# Patient Record
Sex: MALE | Race: WHITE | NOT HISPANIC OR LATINO | Employment: FULL TIME | URBAN - METROPOLITAN AREA
[De-identification: names, ages, dates, MRNs, and addresses within clinical notes are randomized per-mention and may not be internally consistent; named-entity substitution may affect disease eponyms.]

---

## 2017-02-03 ENCOUNTER — HOSPITAL ENCOUNTER (OUTPATIENT)
Dept: FAMILY MEDICINE CLINIC | Facility: CLINIC | Age: 57
Setting detail: SPECIMEN
Discharge: HOME | End: 2017-02-03
Attending: PHYSICIAN ASSISTANT | Admitting: PHYSICIAN ASSISTANT

## 2017-02-08 ENCOUNTER — HOSPITAL ENCOUNTER (OUTPATIENT)
Dept: FAMILY MEDICINE CLINIC | Facility: CLINIC | Age: 57
Setting detail: SPECIMEN
Discharge: HOME | End: 2017-02-08
Attending: PHYSICIAN ASSISTANT | Admitting: PHYSICIAN ASSISTANT

## 2017-02-08 LAB
ALBUMIN SERPL-MCNC: 4.3 G/DL (ref 3.5–4.8)
ALBUMIN/GLOB SERPL: 1.7 {RATIO} (ref 1–1.7)
ALP SERPL-CCNC: 53 IU/L (ref 32–91)
ALT SERPL-CCNC: 15 IU/L (ref 17–63)
ANION GAP SERPL CALC-SCNC: 12.5 MMOL/L (ref 10–20)
AST SERPL-CCNC: 32 IU/L (ref 15–41)
BILIRUB SERPL-MCNC: 0.9 MG/DL (ref 0.3–1.2)
BUN SERPL-MCNC: 19 MG/DL (ref 8–20)
BUN/CREAT SERPL: 15.8 (ref 6.2–20.3)
CALCIUM SERPL-MCNC: 10 MG/DL (ref 8.9–10.3)
CHLORIDE SERPL-SCNC: 103 MMOL/L (ref 101–111)
CHOLEST SERPL-MCNC: 149 MG/DL
CHOLEST/HDLC SERPL: 4.9 {RATIO}
CONV CO2: 29 MMOL/L (ref 22–32)
CONV LDL CHOLESTEROL DIRECT: 98 MG/DL (ref 0–100)
CONV TOTAL PROTEIN: 6.8 G/DL (ref 6.1–7.9)
CREAT UR-MCNC: 1.2 MG/DL (ref 0.7–1.2)
GLOBULIN UR ELPH-MCNC: 2.5 G/DL (ref 2.5–3.8)
GLUCOSE SERPL-MCNC: 92 MG/DL (ref 65–99)
HDLC SERPL-MCNC: 30 MG/DL
LDLC/HDLC SERPL: 3.2 {RATIO}
LIPID INTERPRETATION: ABNORMAL
POTASSIUM SERPL-SCNC: 4.5 MMOL/L (ref 3.6–5.1)
PSA SERPL-MCNC: 0.44 NG/ML (ref 0–4)
SODIUM SERPL-SCNC: 140 MMOL/L (ref 136–144)
TRIGL SERPL-MCNC: 219 MG/DL
TSH SERPL-ACNC: 1.61 UIU/ML (ref 0.34–5.6)
VLDLC SERPL CALC-MCNC: 21.2 MG/DL

## 2017-03-07 ENCOUNTER — HOSPITAL ENCOUNTER (OUTPATIENT)
Dept: PAIN MEDICINE | Facility: HOSPITAL | Age: 57
Discharge: HOME | End: 2017-03-07
Attending: ANESTHESIOLOGY | Admitting: ANESTHESIOLOGY

## 2017-05-15 ENCOUNTER — HOSPITAL ENCOUNTER (OUTPATIENT)
Dept: SLEEP MEDICINE | Facility: HOSPITAL | Age: 57
Discharge: HOME | End: 2017-05-15
Attending: INTERNAL MEDICINE | Admitting: INTERNAL MEDICINE

## 2017-10-26 ENCOUNTER — HOSPITAL ENCOUNTER (OUTPATIENT)
Dept: PREADMISSION TESTING | Facility: HOSPITAL | Age: 57
Discharge: HOME | End: 2017-10-26
Attending: ORTHOPAEDIC SURGERY | Admitting: ORTHOPAEDIC SURGERY

## 2017-10-26 LAB
ANION GAP SERPL CALC-SCNC: 10.2 MMOL/L (ref 10–20)
BACTERIA SPEC AEROBE CULT: NORMAL
BASOPHILS # BLD AUTO: 0.1 10*3/UL (ref 0–0.2)
BASOPHILS NFR BLD AUTO: 1 % (ref 0–2)
BILIRUB UR QL STRIP: NEGATIVE MG/DL
BUN SERPL-MCNC: 16 MG/DL (ref 8–20)
BUN/CREAT SERPL: 20 (ref 6.2–20.3)
CALCIUM SERPL-MCNC: 9.1 MG/DL (ref 8.9–10.3)
CASTS URNS QL MICRO: NORMAL /[LPF]
CHLORIDE SERPL-SCNC: 106 MMOL/L (ref 101–111)
COLOR UR: YELLOW
CONV BACTERIA IN URINE MICRO: NEGATIVE
CONV CLARITY OF URINE: CLEAR
CONV CO2: 24 MMOL/L (ref 22–32)
CONV HYALINE CASTS IN URINE MICRO: 0 /[LPF] (ref 0–5)
CONV PROTEIN IN URINE BY AUTOMATED TEST STRIP: NEGATIVE MG/DL
CONV SMALL ROUND CELLS: NORMAL /[HPF]
CONV UROBILINOGEN IN URINE BY AUTOMATED TEST STRIP: 1 MG/DL
CREAT UR-MCNC: 0.8 MG/DL (ref 0.7–1.2)
CULTURE INDICATED?: NORMAL
DIFFERENTIAL METHOD BLD: (no result)
EOSINOPHIL # BLD AUTO: 0.2 10*3/UL (ref 0–0.3)
EOSINOPHIL # BLD AUTO: 3 % (ref 0–3)
ERYTHROCYTE [DISTWIDTH] IN BLOOD BY AUTOMATED COUNT: 13.8 % (ref 11.5–14.5)
GLUCOSE SERPL-MCNC: 96 MG/DL (ref 65–99)
GLUCOSE UR QL: NEGATIVE MG/DL
HCT VFR BLD AUTO: 55.9 % (ref 40–54)
HGB BLD-MCNC: (no result) G/DL
HGB BLD-MCNC: (no result) G/DL (ref 14–18)
HGB UR QL STRIP: NEGATIVE
KETONES UR QL STRIP: NEGATIVE MG/DL
LEUKOCYTE ESTERASE UR QL STRIP: NEGATIVE
LYMPHOCYTES # BLD AUTO: 1.1 10*3/UL (ref 0.8–4.8)
LYMPHOCYTES NFR BLD AUTO: 18 % (ref 18–42)
Lab: NORMAL
MCH RBC QN AUTO: 31.2 PG (ref 26–32)
MCHC RBC AUTO-ENTMCNC: 33.5 G/DL (ref 32–36)
MCV RBC AUTO: 93.2 FL (ref 80–94)
MICRO REPORT STATUS: NORMAL
MONOCYTES # BLD AUTO: 0.4 10*3/UL (ref 0.1–1.3)
MONOCYTES NFR BLD AUTO: 7 % (ref 2–11)
NEUTROPHILS # BLD AUTO: 4.6 10*3/UL (ref 2.3–8.6)
NEUTROPHILS NFR BLD AUTO: 71 % (ref 50–75)
NITRITE UR QL STRIP: NEGATIVE
NRBC BLD AUTO-RTO: 0 /100{WBCS}
NRBC/RBC NFR BLD MANUAL: 0 10*3/UL
PH UR STRIP.AUTO: 6 [PH] (ref 4.5–8)
PLATELET # BLD AUTO: 113 10*3/UL (ref 150–450)
PMV BLD AUTO: 10.1 FL (ref 7.4–10.4)
POTASSIUM SERPL-SCNC: 4.2 MMOL/L (ref 3.6–5.1)
RBC # BLD AUTO: 5.99 10*6/UL (ref 4.6–6)
RBC #/AREA URNS HPF: 2 /[HPF] (ref 0–3)
SODIUM SERPL-SCNC: 136 MMOL/L (ref 136–144)
SP GR UR: 1.02 (ref 1–1.03)
SPECIMEN SOURCE: NORMAL
SPERM URNS QL MICRO: NORMAL /[HPF]
SQUAMOUS SPT QL MICRO: 0 /[HPF] (ref 0–5)
UNIDENT CRYS URNS QL MICRO: NORMAL /[HPF]
WBC # BLD AUTO: 6.4 10*3/UL (ref 4.5–11.5)
WBC #/AREA URNS HPF: 1 /[HPF] (ref 0–5)
YEAST SPEC QL WET PREP: NORMAL /[HPF]

## 2017-11-01 ENCOUNTER — HOSPITAL ENCOUNTER (OUTPATIENT)
Dept: PREOP | Facility: HOSPITAL | Age: 57
Setting detail: HOSPITAL OUTPATIENT SURGERY
Discharge: HOME HEALTH | End: 2017-11-01
Attending: ORTHOPAEDIC SURGERY | Admitting: ORTHOPAEDIC SURGERY

## 2017-11-22 ENCOUNTER — HOSPITAL ENCOUNTER (OUTPATIENT)
Dept: PHYSICAL THERAPY | Facility: HOSPITAL | Age: 57
Setting detail: RECURRING SERIES
Discharge: HOME | End: 2018-01-31
Attending: ORTHOPAEDIC SURGERY | Admitting: ORTHOPAEDIC SURGERY

## 2018-01-10 ENCOUNTER — HOSPITAL ENCOUNTER (OUTPATIENT)
Dept: PREADMISSION TESTING | Facility: HOSPITAL | Age: 58
Discharge: HOME | End: 2018-01-10
Attending: ORTHOPAEDIC SURGERY | Admitting: ORTHOPAEDIC SURGERY

## 2018-01-10 LAB
ANION GAP SERPL CALC-SCNC: 11.7 MMOL/L (ref 10–20)
BACTERIA SPEC AEROBE CULT: NORMAL
BASOPHILS # BLD AUTO: 0 10*3/UL (ref 0–0.2)
BASOPHILS NFR BLD AUTO: 1 % (ref 0–2)
BILIRUB UR QL STRIP: NEGATIVE MG/DL
BUN SERPL-MCNC: 21 MG/DL (ref 8–20)
BUN/CREAT SERPL: 21 (ref 6.2–20.3)
CALCIUM SERPL-MCNC: 9.7 MG/DL (ref 8.9–10.3)
CASTS URNS QL MICRO: NORMAL /[LPF]
CHLORIDE SERPL-SCNC: 103 MMOL/L (ref 101–111)
COLOR UR: YELLOW
CONV BACTERIA IN URINE MICRO: NEGATIVE
CONV CLARITY OF URINE: CLEAR
CONV CO2: 31 MMOL/L (ref 22–32)
CONV HYALINE CASTS IN URINE MICRO: 1 /[LPF] (ref 0–5)
CONV PROTEIN IN URINE BY AUTOMATED TEST STRIP: NEGATIVE MG/DL
CONV SMALL ROUND CELLS: NORMAL /[HPF]
CONV UROBILINOGEN IN URINE BY AUTOMATED TEST STRIP: 1 MG/DL
CREAT UR-MCNC: 1 MG/DL (ref 0.7–1.2)
CULTURE INDICATED?: NORMAL
DIFFERENTIAL METHOD BLD: (no result)
EOSINOPHIL # BLD AUTO: 0.2 10*3/UL (ref 0–0.3)
EOSINOPHIL # BLD AUTO: 4 % (ref 0–3)
ERYTHROCYTE [DISTWIDTH] IN BLOOD BY AUTOMATED COUNT: 13.9 % (ref 11.5–14.5)
GLUCOSE SERPL-MCNC: 90 MG/DL (ref 65–99)
GLUCOSE UR QL: NEGATIVE MG/DL
HCT VFR BLD AUTO: 52.4 % (ref 40–54)
HGB BLD-MCNC: 17.3 G/DL (ref 14–18)
HGB UR QL STRIP: NEGATIVE
KETONES UR QL STRIP: NEGATIVE MG/DL
LEUKOCYTE ESTERASE UR QL STRIP: NEGATIVE
LYMPHOCYTES # BLD AUTO: 1.5 10*3/UL (ref 0.8–4.8)
LYMPHOCYTES NFR BLD AUTO: 23 % (ref 18–42)
Lab: NORMAL
MCH RBC QN AUTO: 31 PG (ref 26–32)
MCHC RBC AUTO-ENTMCNC: 33.1 G/DL (ref 32–36)
MCV RBC AUTO: 93.8 FL (ref 80–94)
MICRO REPORT STATUS: NORMAL
MONOCYTES # BLD AUTO: 0.4 10*3/UL (ref 0.1–1.3)
MONOCYTES NFR BLD AUTO: 6 % (ref 2–11)
NEUTROPHILS # BLD AUTO: 4.3 10*3/UL (ref 2.3–8.6)
NEUTROPHILS NFR BLD AUTO: 66 % (ref 50–75)
NITRITE UR QL STRIP: NEGATIVE
NRBC BLD AUTO-RTO: 0 /100{WBCS}
NRBC/RBC NFR BLD MANUAL: 0 10*3/UL
PH UR STRIP.AUTO: 6.5 [PH] (ref 4.5–8)
PLATELET # BLD AUTO: 109 10*3/UL (ref 150–450)
PMV BLD AUTO: 10 FL (ref 7.4–10.4)
POTASSIUM SERPL-SCNC: 4.7 MMOL/L (ref 3.6–5.1)
RBC # BLD AUTO: 5.59 10*6/UL (ref 4.6–6)
RBC #/AREA URNS HPF: 2 /[HPF] (ref 0–3)
SODIUM SERPL-SCNC: 141 MMOL/L (ref 136–144)
SP GR UR: 1.03 (ref 1–1.03)
SPECIMEN SOURCE: NORMAL
SPERM URNS QL MICRO: NORMAL /[HPF]
SQUAMOUS SPT QL MICRO: 0 /[HPF] (ref 0–5)
UNIDENT CRYS URNS QL MICRO: NORMAL /[HPF]
WBC # BLD AUTO: 6.5 10*3/UL (ref 4.5–11.5)
WBC #/AREA URNS HPF: 1 /[HPF] (ref 0–5)
YEAST SPEC QL WET PREP: NORMAL /[HPF]

## 2018-01-17 ENCOUNTER — HOSPITAL ENCOUNTER (OUTPATIENT)
Dept: PREOP | Facility: HOSPITAL | Age: 58
Setting detail: HOSPITAL OUTPATIENT SURGERY
Discharge: HOME | End: 2018-01-17
Attending: ORTHOPAEDIC SURGERY | Admitting: ORTHOPAEDIC SURGERY

## 2018-02-15 ENCOUNTER — HOSPITAL ENCOUNTER (OUTPATIENT)
Dept: PHYSICAL THERAPY | Facility: HOSPITAL | Age: 58
Setting detail: RECURRING SERIES
Discharge: HOME | End: 2018-04-30
Attending: ORTHOPAEDIC SURGERY | Admitting: ORTHOPAEDIC SURGERY

## 2019-04-10 ENCOUNTER — HOSPITAL ENCOUNTER (OUTPATIENT)
Dept: FAMILY MEDICINE CLINIC | Facility: CLINIC | Age: 59
Setting detail: SPECIMEN
Discharge: HOME | End: 2019-04-10
Attending: FAMILY MEDICINE | Admitting: FAMILY MEDICINE

## 2019-04-10 LAB
ALBUMIN SERPL-MCNC: 3.9 G/DL (ref 3.5–4.8)
ALBUMIN/GLOB SERPL: 1.7 {RATIO} (ref 1–1.7)
ALP SERPL-CCNC: 52 IU/L (ref 32–91)
ALT SERPL-CCNC: 13 IU/L (ref 17–63)
ANION GAP SERPL CALC-SCNC: 15.1 MMOL/L (ref 10–20)
AST SERPL-CCNC: 24 IU/L (ref 15–41)
BASOPHILS # BLD AUTO: 0 10*3/UL (ref 0–0.2)
BASOPHILS NFR BLD AUTO: 1 % (ref 0–2)
BILIRUB SERPL-MCNC: 0.9 MG/DL (ref 0.3–1.2)
BUN SERPL-MCNC: 22 MG/DL (ref 8–20)
BUN/CREAT SERPL: 22 (ref 6.2–20.3)
CALCIUM SERPL-MCNC: 9.1 MG/DL (ref 8.9–10.3)
CHLORIDE SERPL-SCNC: 103 MMOL/L (ref 101–111)
CHOLEST SERPL-MCNC: 132 MG/DL
CHOLEST/HDLC SERPL: 3.2 {RATIO}
CONV CO2: 26 MMOL/L (ref 22–32)
CONV LDL CHOLESTEROL DIRECT: 83 MG/DL (ref 0–100)
CONV TOTAL PROTEIN: 6.2 G/DL (ref 6.1–7.9)
CREAT UR-MCNC: 1 MG/DL (ref 0.7–1.2)
DIFFERENTIAL METHOD BLD: (no result)
EOSINOPHIL # BLD AUTO: 0.1 10*3/UL (ref 0–0.3)
EOSINOPHIL # BLD AUTO: 2 % (ref 0–3)
ERYTHROCYTE [DISTWIDTH] IN BLOOD BY AUTOMATED COUNT: 13.5 % (ref 11.5–14.5)
GLOBULIN UR ELPH-MCNC: 2.3 G/DL (ref 2.5–3.8)
GLUCOSE SERPL-MCNC: 80 MG/DL (ref 65–99)
HCT VFR BLD AUTO: 52.5 % (ref 40–54)
HDLC SERPL-MCNC: 42 MG/DL
HGB BLD-MCNC: 17.6 G/DL (ref 14–18)
LDLC/HDLC SERPL: 2 {RATIO}
LIPID INTERPRETATION: NORMAL
LYMPHOCYTES # BLD AUTO: 0.9 10*3/UL (ref 0.8–4.8)
LYMPHOCYTES NFR BLD AUTO: 19 % (ref 18–42)
MCH RBC QN AUTO: 32.1 PG (ref 26–32)
MCHC RBC AUTO-ENTMCNC: 33.5 G/DL (ref 32–36)
MCV RBC AUTO: 95.9 FL (ref 80–94)
MONOCYTES # BLD AUTO: 0.3 10*3/UL (ref 0.1–1.3)
MONOCYTES NFR BLD AUTO: 7 % (ref 2–11)
NEUTROPHILS # BLD AUTO: 3.4 10*3/UL (ref 2.3–8.6)
NEUTROPHILS NFR BLD AUTO: 71 % (ref 50–75)
NRBC BLD AUTO-RTO: 0 /100{WBCS}
NRBC/RBC NFR BLD MANUAL: 0 10*3/UL
PLATELET # BLD AUTO: 117 10*3/UL (ref 150–450)
PMV BLD AUTO: 10.6 FL (ref 7.4–10.4)
POTASSIUM SERPL-SCNC: 4.1 MMOL/L (ref 3.6–5.1)
RBC # BLD AUTO: 5.47 10*6/UL (ref 4.6–6)
SODIUM SERPL-SCNC: 140 MMOL/L (ref 136–144)
TRIGL SERPL-MCNC: 40 MG/DL
VLDLC SERPL CALC-MCNC: 7.3 MG/DL
WBC # BLD AUTO: 4.8 10*3/UL (ref 4.5–11.5)

## 2019-07-18 ENCOUNTER — TELEPHONE (OUTPATIENT)
Dept: FAMILY MEDICINE CLINIC | Facility: CLINIC | Age: 59
End: 2019-07-18

## 2019-07-18 NOTE — TELEPHONE ENCOUNTER
I spoke with the patient's wife.  The patient's labs are okay.  He is to continue as is.  His blood pressure has been running high.  He is to schedule an appointment.

## 2019-07-23 PROBLEM — I10 BENIGN ESSENTIAL HYPERTENSION: Status: ACTIVE | Noted: 2017-02-03

## 2019-07-23 PROBLEM — G47.30 SLEEP APNEA: Status: ACTIVE | Noted: 2019-07-23

## 2019-07-23 RX ORDER — TESTOSTERONE CYPIONATE 200 MG/ML
200 INJECTION, SOLUTION INTRAMUSCULAR
Refills: 0 | COMMUNITY
Start: 2019-06-18 | End: 2022-01-12

## 2019-07-23 RX ORDER — MELOXICAM 15 MG/1
TABLET ORAL EVERY 24 HOURS
COMMUNITY
Start: 2017-09-21 | End: 2019-08-12

## 2019-07-23 RX ORDER — AZELASTINE HYDROCHLORIDE 137 UG/1
SPRAY, METERED NASAL
COMMUNITY
Start: 2017-03-07 | End: 2019-08-12

## 2019-07-23 RX ORDER — SILDENAFIL 100 MG/1
100 TABLET, FILM COATED ORAL AS NEEDED
Status: ON HOLD | COMMUNITY
Start: 2019-04-10 | End: 2022-01-14

## 2019-07-23 RX ORDER — LEVOTHYROXINE AND LIOTHYRONINE 9.5; 2.25 UG/1; UG/1
TABLET ORAL EVERY 24 HOURS
COMMUNITY
Start: 2017-02-03 | End: 2019-10-09

## 2019-07-23 RX ORDER — PENTOXIFYLLINE 400 MG/1
400 TABLET, EXTENDED RELEASE ORAL EVERY 12 HOURS
Qty: 180 TABLET | Refills: 0 | Status: SHIPPED | OUTPATIENT
Start: 2019-07-23 | End: 2019-08-01 | Stop reason: SDUPTHER

## 2019-07-23 RX ORDER — AMLODIPINE BESYLATE 5 MG/1
5 TABLET ORAL DAILY
Refills: 2 | COMMUNITY
Start: 2019-07-15 | End: 2020-08-31

## 2019-07-23 RX ORDER — MONTELUKAST SODIUM 10 MG/1
TABLET ORAL EVERY 24 HOURS
COMMUNITY
Start: 2018-11-12 | End: 2019-08-01 | Stop reason: SDUPTHER

## 2019-07-23 RX ORDER — PENTOXIFYLLINE 400 MG/1
1 TABLET, EXTENDED RELEASE ORAL EVERY 12 HOURS
COMMUNITY
Start: 2014-10-16 | End: 2019-07-23 | Stop reason: SDUPTHER

## 2019-07-23 RX ORDER — TRAMADOL HYDROCHLORIDE 100 MG/1
1 CAPSULE ORAL AS NEEDED
COMMUNITY
Start: 2017-01-30 | End: 2021-02-01

## 2019-07-23 RX ORDER — MECLIZINE HYDROCHLORIDE 25 MG/1
25 TABLET ORAL 3 TIMES DAILY PRN
Status: ON HOLD | COMMUNITY
Start: 2017-01-30 | End: 2022-01-14

## 2019-08-01 RX ORDER — MONTELUKAST SODIUM 10 MG/1
10 TABLET ORAL EVERY 24 HOURS
Qty: 90 TABLET | Refills: 0 | Status: SHIPPED | OUTPATIENT
Start: 2019-08-01 | End: 2019-10-03 | Stop reason: SDUPTHER

## 2019-08-01 RX ORDER — PENTOXIFYLLINE 400 MG/1
400 TABLET, EXTENDED RELEASE ORAL EVERY 12 HOURS
Qty: 180 TABLET | Refills: 0 | Status: SHIPPED | OUTPATIENT
Start: 2019-08-01 | End: 2019-08-12

## 2019-08-12 ENCOUNTER — OFFICE VISIT (OUTPATIENT)
Dept: FAMILY MEDICINE CLINIC | Facility: CLINIC | Age: 59
End: 2019-08-12

## 2019-08-12 VITALS
OXYGEN SATURATION: 98 % | DIASTOLIC BLOOD PRESSURE: 65 MMHG | BODY MASS INDEX: 29.86 KG/M2 | SYSTOLIC BLOOD PRESSURE: 114 MMHG | HEART RATE: 59 BPM | WEIGHT: 240.2 LBS | HEIGHT: 75 IN | RESPIRATION RATE: 16 BRPM | TEMPERATURE: 98 F

## 2019-08-12 DIAGNOSIS — R42 VERTIGO: ICD-10-CM

## 2019-08-12 DIAGNOSIS — J30.9 ALLERGIC RHINITIS, UNSPECIFIED SEASONALITY, UNSPECIFIED TRIGGER: ICD-10-CM

## 2019-08-12 DIAGNOSIS — I10 BENIGN ESSENTIAL HYPERTENSION: Primary | ICD-10-CM

## 2019-08-12 DIAGNOSIS — E29.1 TESTICULAR HYPOFUNCTION: ICD-10-CM

## 2019-08-12 DIAGNOSIS — M19.90 ARTHRITIS: ICD-10-CM

## 2019-08-12 DIAGNOSIS — G47.30 SLEEP APNEA, UNSPECIFIED TYPE: ICD-10-CM

## 2019-08-12 PROBLEM — E03.9 ACQUIRED HYPOTHYROIDISM: Status: ACTIVE | Noted: 2019-08-12

## 2019-08-12 PROCEDURE — 99214 OFFICE O/P EST MOD 30 MIN: CPT | Performed by: FAMILY MEDICINE

## 2019-08-12 RX ORDER — ANASTROZOLE 1 MG/1
TABLET ORAL
COMMUNITY
Start: 2016-11-30 | End: 2022-01-12

## 2019-08-12 RX ORDER — OLMESARTAN MEDOXOMIL 20 MG/1
10 TABLET ORAL EVERY MORNING
Refills: 2 | COMMUNITY
Start: 2019-07-24 | End: 2021-12-14 | Stop reason: SDUPTHER

## 2019-08-12 RX ORDER — CETIRIZINE HYDROCHLORIDE 10 MG/1
10 TABLET ORAL EVERY MORNING
COMMUNITY

## 2019-08-12 RX ORDER — FLUTICASONE PROPIONATE 50 MCG
2 SPRAY, SUSPENSION (ML) NASAL AS NEEDED
COMMUNITY

## 2019-08-12 NOTE — PROGRESS NOTES
"Subjective   Robin Wheatley is a 58 y.o. male.     Chief Complaint   Patient presents with   • Hypertension     FOLLOWUP       HPI  Complaint: Hypertension allergic rhinitis sleep apnea hypothyroidism arthritis vertigo    The patient is a 58-year-old white male comes in for follow-up and maintenance of his current problems which include    #1 hypertension-stable-patient Benicar 10 mg daily and amlodipine 5 mg daily.  These were started recently previous antihypertensive while training.  He denies any lightheaded dizziness or chest pain.    #2 allergic rhinitis-stable-patient on Flonase Singulair and Zyrtec.  Disposition cough shortness breath or wheezing.    #3 hypothyroidism-stable patient on Synthroid.  He denies heat or cold intolerance tremor weight gain weight loss    #4 vertigo-stable-patient is off Trental.  Patient states that he has not had an episode of vertigo since he takes Zyrtec and Singulair.    #5 testicular hypofunction-stable-patient on testosterone repletion therapy index.  Patient states that helps with his strength stamina and energy        The following portions of the patient's history were reviewed and updated as appropriate: allergies, current medications, past family history, past medical history, past social history, past surgical history and problem list.    Review of Systems    Objective     /65 (BP Location: Left arm, Patient Position: Sitting, Cuff Size: Adult)   Pulse 59   Temp 98 °F (36.7 °C) (Oral)   Resp 16   Ht 190.5 cm (75\")   Wt 109 kg (240 lb 3.2 oz)   SpO2 98%   BMI 30.02 kg/m²     Physical Exam   Constitutional: He is oriented to person, place, and time. He appears well-developed and well-nourished.   HENT:   Head: Normocephalic and atraumatic.   Eyes: Conjunctivae and EOM are normal. Pupils are equal, round, and reactive to light.   Neck: Normal range of motion. Neck supple.   Cardiovascular: Normal rate, regular rhythm, normal heart sounds and intact distal " pulses.   Pulmonary/Chest: Effort normal and breath sounds normal.   Abdominal: Soft. Bowel sounds are normal.   Musculoskeletal: Normal range of motion.   Neurological: He is alert and oriented to person, place, and time.   Skin: Skin is warm and dry.   Psychiatric: He has a normal mood and affect. His behavior is normal.   Nursing note and vitals reviewed.        Assessment/Plan   Robin was seen today for hypertension.    Diagnoses and all orders for this visit:    Benign essential hypertension    Allergic rhinitis, unspecified seasonality, unspecified trigger    Sleep apnea, unspecified type    Testicular hypofunction    Vertigo    Arthritis      Patient Instructions   Continue your current medications and treatment.    Follow up in 6 months.    Laboratory testing at that time.      Marlo Michaels Jr., MD    08/12/19

## 2019-08-12 NOTE — PATIENT INSTRUCTIONS
Continue your current medications and treatment.    Follow up in 6 months.    Laboratory testing at that time.

## 2019-10-06 RX ORDER — MONTELUKAST SODIUM 10 MG/1
TABLET ORAL
Qty: 90 TABLET | Refills: 0 | Status: SHIPPED | OUTPATIENT
Start: 2019-10-06 | End: 2019-12-05 | Stop reason: SDUPTHER

## 2019-10-09 ENCOUNTER — OFFICE VISIT (OUTPATIENT)
Dept: FAMILY MEDICINE CLINIC | Facility: CLINIC | Age: 59
End: 2019-10-09

## 2019-10-09 VITALS
TEMPERATURE: 98.6 F | RESPIRATION RATE: 20 BRPM | DIASTOLIC BLOOD PRESSURE: 69 MMHG | WEIGHT: 244.9 LBS | BODY MASS INDEX: 30.45 KG/M2 | HEART RATE: 50 BPM | OXYGEN SATURATION: 98 % | HEIGHT: 75 IN | SYSTOLIC BLOOD PRESSURE: 115 MMHG

## 2019-10-09 DIAGNOSIS — E03.9 ACQUIRED HYPOTHYROIDISM: ICD-10-CM

## 2019-10-09 DIAGNOSIS — R42 VERTIGO: ICD-10-CM

## 2019-10-09 DIAGNOSIS — G47.30 SLEEP APNEA, UNSPECIFIED TYPE: ICD-10-CM

## 2019-10-09 DIAGNOSIS — I10 BENIGN ESSENTIAL HYPERTENSION: Primary | ICD-10-CM

## 2019-10-09 DIAGNOSIS — E29.1 TESTICULAR HYPOFUNCTION: ICD-10-CM

## 2019-10-09 DIAGNOSIS — J30.9 ALLERGIC RHINITIS, UNSPECIFIED SEASONALITY, UNSPECIFIED TRIGGER: ICD-10-CM

## 2019-10-09 PROCEDURE — 99214 OFFICE O/P EST MOD 30 MIN: CPT | Performed by: FAMILY MEDICINE

## 2019-10-09 RX ORDER — THYROID 60 MG
1 TABLET ORAL DAILY
COMMUNITY
Start: 2019-09-11 | End: 2021-07-26 | Stop reason: SDUPTHER

## 2019-10-09 RX ORDER — PENTOXIFYLLINE 400 MG/1
1 TABLET, EXTENDED RELEASE ORAL 2 TIMES DAILY PRN
COMMUNITY
Start: 2019-09-22 | End: 2019-11-25 | Stop reason: SDUPTHER

## 2019-10-09 NOTE — PROGRESS NOTES
"Subjective   Robin Wheatley is a 59 y.o. male.     Chief Complaint   Patient presents with   • Hypothyroidism   • Hypertension       HPI  Chief complaint: Hypertension hyper thyroidism allergic rhinitis sleep apnea vertigo    Patient is a 59-year-old white male comes in for follow-up and maintenance of his current problems which includes    1.  Hypertension-stable patient on Benicar 20 g daily and Norvasc 5 mg daily.  He denies any lightheaded dizziness or chest pain.    2.  Hypothyroidism-stable patient on thyroid replacement therapy.  Denied heat or cold intolerance.  No tremor weight gain weight loss.    3.  Allergic rhinitis-stable-patient is on singular 10 mg once a day Flonase 2 sprays each nostril once antihistamines.  Nasal congestion cough shortness breath or wheezing.     4.  Hypothyroidism-stable on patient current Synthroid 60 mg a day.  Not your cold intolerance.  No tremor weight gain weight loss.    5.  Testicular hypofunction-stable-patient is currently on testosterone replacement.  States it helps with his strength stamina libido    6.  Vertigo-stable-the  patient is on Trental on as-needed basis.  He has occasional episodes of early dizziness.    7.  Sleep Apnea -stable- the patient is on CPAP.  States it helps the daytime sleepiness and hypersomnolence per      The following portions of the patient's history were reviewed and updated as appropriate: allergies, current medications, past family history, past medical history, past social history, past surgical history and problem list.    Review of Systems    Objective     /69 (BP Location: Left arm, Patient Position: Sitting, Cuff Size: Large Adult)   Pulse 50   Temp 98.6 °F (37 °C) (Oral)   Resp 20   Ht 190.5 cm (75\")   Wt 111 kg (244 lb 14.4 oz)   SpO2 98%   BMI 30.61 kg/m²     Physical Exam   Constitutional: He is oriented to person, place, and time. He appears well-developed and well-nourished.   HENT:   Head: Normocephalic and " atraumatic.   Eyes: Conjunctivae and EOM are normal. Pupils are equal, round, and reactive to light.   Neck: Normal range of motion. Neck supple.   Cardiovascular: Normal rate, regular rhythm, normal heart sounds and intact distal pulses.   Pulmonary/Chest: Effort normal and breath sounds normal.   Abdominal: Soft. Bowel sounds are normal.   Musculoskeletal: Normal range of motion.   Neurological: He is alert and oriented to person, place, and time.   Skin: Skin is warm and dry.   Psychiatric: He has a normal mood and affect. His behavior is normal.   Nursing note and vitals reviewed.        Assessment/Plan   Robin was seen today for hypothyroidism and hypertension.    Diagnoses and all orders for this visit:    Benign essential hypertension  -     CBC & Differential; Future  -     Comprehensive Metabolic Panel; Future    Allergic rhinitis, unspecified seasonality, unspecified trigger    Sleep apnea, unspecified type    Acquired hypothyroidism  -     TSH; Future    Vertigo    Testicular hypofunction      Patient Instructions   Continue your current medications and treatment.    Have the follow up llabs done and call for results.    Follow up in the offe in 6 months.      Marlo Michaels Jr., MD    10/09/19

## 2019-10-09 NOTE — PATIENT INSTRUCTIONS
Continue your current medications and treatment.    Have the follow up llabs done and call for results.    Follow up in the offcie in 6 months.

## 2019-10-22 ENCOUNTER — LAB (OUTPATIENT)
Dept: LAB | Facility: HOSPITAL | Age: 59
End: 2019-10-22

## 2019-10-22 DIAGNOSIS — E03.9 ACQUIRED HYPOTHYROIDISM: ICD-10-CM

## 2019-10-22 DIAGNOSIS — I10 BENIGN ESSENTIAL HYPERTENSION: ICD-10-CM

## 2019-10-22 LAB
ALBUMIN SERPL-MCNC: 4.3 G/DL (ref 3.5–5.2)
ALBUMIN/GLOB SERPL: 2 G/DL
ALP SERPL-CCNC: 55 U/L (ref 39–117)
ALT SERPL W P-5'-P-CCNC: 12 U/L (ref 1–41)
ANION GAP SERPL CALCULATED.3IONS-SCNC: 13 MMOL/L (ref 5–15)
AST SERPL-CCNC: 23 U/L (ref 1–40)
BASOPHILS # BLD MANUAL: 0.06 10*3/MM3 (ref 0–0.2)
BASOPHILS NFR BLD AUTO: 1 % (ref 0–1.5)
BILIRUB SERPL-MCNC: 0.6 MG/DL (ref 0.2–1.2)
BUN BLD-MCNC: 19 MG/DL (ref 8–23)
BUN/CREAT SERPL: 19.8 (ref 7–25)
CALCIUM SPEC-SCNC: 9.2 MG/DL (ref 8.6–10.5)
CHLORIDE SERPL-SCNC: 104 MMOL/L (ref 98–107)
CO2 SERPL-SCNC: 28 MMOL/L (ref 22–29)
CREAT BLD-MCNC: 0.96 MG/DL (ref 0.76–1.27)
DEPRECATED RDW RBC AUTO: 41.6 FL (ref 37–54)
EOSINOPHIL # BLD MANUAL: 0.28 10*3/MM3 (ref 0–0.4)
EOSINOPHIL NFR BLD MANUAL: 5 % (ref 0.3–6.2)
ERYTHROCYTE [DISTWIDTH] IN BLOOD BY AUTOMATED COUNT: 12 % (ref 12.3–15.4)
GFR SERPL CREATININE-BSD FRML MDRD: 80 ML/MIN/1.73
GLOBULIN UR ELPH-MCNC: 2.2 GM/DL
GLUCOSE BLD-MCNC: 86 MG/DL (ref 65–99)
HCT VFR BLD AUTO: 48.4 % (ref 37.5–51)
HGB BLD-MCNC: 16.4 G/DL (ref 13–17.7)
LYMPHOCYTES # BLD MANUAL: 1.23 10*3/MM3 (ref 0.7–3.1)
LYMPHOCYTES NFR BLD MANUAL: 2 % (ref 5–12)
LYMPHOCYTES NFR BLD MANUAL: 22 % (ref 19.6–45.3)
MCH RBC QN AUTO: 31.7 PG (ref 26.6–33)
MCHC RBC AUTO-ENTMCNC: 33.9 G/DL (ref 31.5–35.7)
MCV RBC AUTO: 93.4 FL (ref 79–97)
MONOCYTES # BLD AUTO: 0.11 10*3/MM3 (ref 0.1–0.9)
NEUTROPHILS # BLD AUTO: 3.91 10*3/MM3 (ref 1.7–7)
NEUTROPHILS NFR BLD MANUAL: 70 % (ref 42.7–76)
PLAT MORPH BLD: NORMAL
PLATELET # BLD AUTO: 135 10*3/MM3 (ref 140–450)
PMV BLD AUTO: 11.4 FL (ref 6–12)
POTASSIUM BLD-SCNC: 4.9 MMOL/L (ref 3.5–5.2)
PROT SERPL-MCNC: 6.5 G/DL (ref 6–8.5)
RBC # BLD AUTO: 5.18 10*6/MM3 (ref 4.14–5.8)
RBC MORPH BLD: NORMAL
SODIUM BLD-SCNC: 145 MMOL/L (ref 136–145)
TSH SERPL DL<=0.05 MIU/L-ACNC: 2.06 UIU/ML (ref 0.27–4.2)
WBC MORPH BLD: NORMAL
WBC NRBC COR # BLD: 5.58 10*3/MM3 (ref 3.4–10.8)

## 2019-10-22 PROCEDURE — 85025 COMPLETE CBC W/AUTO DIFF WBC: CPT

## 2019-10-22 PROCEDURE — 80053 COMPREHEN METABOLIC PANEL: CPT

## 2019-10-22 PROCEDURE — 36415 COLL VENOUS BLD VENIPUNCTURE: CPT

## 2019-10-22 PROCEDURE — 85007 BL SMEAR W/DIFF WBC COUNT: CPT

## 2019-10-22 PROCEDURE — 84443 ASSAY THYROID STIM HORMONE: CPT

## 2019-11-13 RX ORDER — MELOXICAM 15 MG/1
15 TABLET ORAL DAILY
Qty: 90 TABLET | Refills: 1 | Status: SHIPPED | OUTPATIENT
Start: 2019-11-13 | End: 2019-11-25 | Stop reason: SDUPTHER

## 2019-11-25 RX ORDER — MELOXICAM 15 MG/1
15 TABLET ORAL DAILY
Qty: 90 TABLET | Refills: 1 | Status: SHIPPED | OUTPATIENT
Start: 2019-11-25 | End: 2020-05-24

## 2019-11-28 RX ORDER — PENTOXIFYLLINE 400 MG/1
TABLET, EXTENDED RELEASE ORAL
Qty: 180 TABLET | Refills: 0 | Status: SHIPPED | OUTPATIENT
Start: 2019-11-28 | End: 2020-02-04

## 2019-12-06 RX ORDER — MONTELUKAST SODIUM 10 MG/1
TABLET ORAL
Qty: 90 TABLET | Refills: 0 | Status: SHIPPED | OUTPATIENT
Start: 2019-12-06 | End: 2020-02-11

## 2020-02-04 RX ORDER — PENTOXIFYLLINE 400 MG/1
TABLET, EXTENDED RELEASE ORAL
Qty: 180 TABLET | Refills: 0 | Status: SHIPPED | OUTPATIENT
Start: 2020-02-04 | End: 2020-04-07

## 2020-02-11 RX ORDER — MONTELUKAST SODIUM 10 MG/1
TABLET ORAL
Qty: 90 TABLET | Refills: 0 | Status: SHIPPED | OUTPATIENT
Start: 2020-02-11 | End: 2020-04-21

## 2020-02-28 ENCOUNTER — OFFICE VISIT (OUTPATIENT)
Dept: FAMILY MEDICINE CLINIC | Facility: CLINIC | Age: 60
End: 2020-02-28

## 2020-02-28 VITALS
HEIGHT: 75 IN | WEIGHT: 253 LBS | OXYGEN SATURATION: 97 % | HEART RATE: 60 BPM | SYSTOLIC BLOOD PRESSURE: 118 MMHG | RESPIRATION RATE: 18 BRPM | BODY MASS INDEX: 31.46 KG/M2 | TEMPERATURE: 98 F | DIASTOLIC BLOOD PRESSURE: 77 MMHG

## 2020-02-28 DIAGNOSIS — J30.9 ALLERGIC RHINITIS, UNSPECIFIED SEASONALITY, UNSPECIFIED TRIGGER: ICD-10-CM

## 2020-02-28 DIAGNOSIS — E29.1 TESTICULAR HYPOFUNCTION: ICD-10-CM

## 2020-02-28 DIAGNOSIS — E03.9 ACQUIRED HYPOTHYROIDISM: ICD-10-CM

## 2020-02-28 DIAGNOSIS — R42 VERTIGO: ICD-10-CM

## 2020-02-28 DIAGNOSIS — I10 BENIGN ESSENTIAL HYPERTENSION: Primary | ICD-10-CM

## 2020-02-28 DIAGNOSIS — G47.30 SLEEP APNEA, UNSPECIFIED TYPE: ICD-10-CM

## 2020-02-28 DIAGNOSIS — M19.90 ARTHRITIS: ICD-10-CM

## 2020-02-28 PROCEDURE — 99214 OFFICE O/P EST MOD 30 MIN: CPT | Performed by: FAMILY MEDICINE

## 2020-02-28 NOTE — PATIENT INSTRUCTIONS
Continue your current medications and treatment.    Have the Tetanus shot.    Follow up in the office in 6 months.    Laboratory testing at that time.

## 2020-02-28 NOTE — PROGRESS NOTES
"Subjective   Robin Wheatley is a 59 y.o. male.     Chief Complaint   Patient presents with   • Hypertension     6 MTH F/U       HPI  Complaint: Hypertension allergic rhinitis sleep apnea testicular hypofunction hypothyroidism arthritis vertigo    The patient is a 59-year-old white male comes in for follow-up and maintenance of his current problems which include    1.  Hypertension-stable-patient on Benicar 20 mg daily Norvasc 5 mg once a day.  He denied headache lightheadedness dizziness or chest pain.    2.  Allergic rhinitis-stable-patient on Singulair 10 mg once a day and antihistamines Flonase 2 sprays each nostril once a day.  He denied cough shortness of breath or wheezing.  Patient does have chronic sinus congestion postnasal drip.    3.  Sleep apnea-stable-patient on CPAP.  He states he feels significantly better since using this.    4.  Hypothyroidism-stable-patient on thyroid replacement therapy.  He denied heat or cold intolerance.  Denied tremor weight gain or weight loss.    5.  Testicular hypofunction-stable-patient is on Arimidex and testosterone.  He states it helps with his strength and stamina.    6.  Arthritis-stable-patient on meloxicam 15 mg once a day and tramadol.    7.  Vertigo-stable-patient is currently on Intal and meclizine.  He states it helps to control his vertigo.        The following portions of the patient's history were reviewed and updated as appropriate: allergies, current medications, past family history, past medical history, past social history, past surgical history and problem list.    Review of Systems    Objective     /77 (BP Location: Left arm, Patient Position: Sitting, Cuff Size: Large Adult)   Pulse 60   Temp 98 °F (36.7 °C) (Oral)   Resp 18   Ht 190.5 cm (75\")   Wt 115 kg (253 lb)   SpO2 97%   BMI 31.62 kg/m²     Physical Exam   Constitutional: He is oriented to person, place, and time. He appears well-developed and well-nourished.   HENT:   Head: " Normocephalic and atraumatic.   Eyes: Pupils are equal, round, and reactive to light. Conjunctivae and EOM are normal.   Neck: Normal range of motion. Neck supple.   Cardiovascular: Normal rate, regular rhythm, normal heart sounds and intact distal pulses.   Pulmonary/Chest: Effort normal and breath sounds normal.   Abdominal: Soft. Bowel sounds are normal.   Musculoskeletal: Normal range of motion.   Neurological: He is alert and oriented to person, place, and time.   Skin: Skin is warm and dry.   Psychiatric: He has a normal mood and affect. His behavior is normal.   Nursing note and vitals reviewed.        Assessment/Plan   Robin was seen today for hypertension.    Diagnoses and all orders for this visit:    Benign essential hypertension    Allergic rhinitis, unspecified seasonality, unspecified trigger    Sleep apnea, unspecified type    Testicular hypofunction    Acquired hypothyroidism    Arthritis    Vertigo      Patient Instructions   Continue your current medications and treatment.    Have the Tetanus shot.    Follow up in the office in 6 months.    Laboratory testing at that time.      Marlo Michaels Jr., MD    02/28/20

## 2020-04-07 RX ORDER — PENTOXIFYLLINE 400 MG/1
TABLET, EXTENDED RELEASE ORAL
Qty: 180 TABLET | Refills: 0 | Status: SHIPPED | OUTPATIENT
Start: 2020-04-07 | End: 2021-04-29 | Stop reason: SDUPTHER

## 2020-04-21 RX ORDER — MONTELUKAST SODIUM 10 MG/1
TABLET ORAL
Qty: 90 TABLET | Refills: 0 | Status: SHIPPED | OUTPATIENT
Start: 2020-04-21 | End: 2020-06-14

## 2020-05-24 RX ORDER — MELOXICAM 15 MG/1
TABLET ORAL
Qty: 90 TABLET | Refills: 0 | Status: SHIPPED | OUTPATIENT
Start: 2020-05-24 | End: 2020-11-24 | Stop reason: SDUPTHER

## 2020-06-14 RX ORDER — MONTELUKAST SODIUM 10 MG/1
TABLET ORAL
Qty: 90 TABLET | Refills: 0 | Status: SHIPPED | OUTPATIENT
Start: 2020-06-14 | End: 2022-01-12

## 2020-08-31 ENCOUNTER — OFFICE VISIT (OUTPATIENT)
Dept: FAMILY MEDICINE CLINIC | Facility: CLINIC | Age: 60
End: 2020-08-31

## 2020-08-31 VITALS
RESPIRATION RATE: 20 BRPM | WEIGHT: 253 LBS | SYSTOLIC BLOOD PRESSURE: 123 MMHG | TEMPERATURE: 97.5 F | DIASTOLIC BLOOD PRESSURE: 73 MMHG | HEART RATE: 67 BPM | HEIGHT: 75 IN | BODY MASS INDEX: 31.46 KG/M2 | OXYGEN SATURATION: 98 %

## 2020-08-31 DIAGNOSIS — M19.90 ARTHRITIS: ICD-10-CM

## 2020-08-31 DIAGNOSIS — E03.9 ACQUIRED HYPOTHYROIDISM: ICD-10-CM

## 2020-08-31 DIAGNOSIS — I10 BENIGN ESSENTIAL HYPERTENSION: Primary | ICD-10-CM

## 2020-08-31 DIAGNOSIS — R42 VERTIGO: ICD-10-CM

## 2020-08-31 DIAGNOSIS — E29.1 TESTICULAR HYPOFUNCTION: ICD-10-CM

## 2020-08-31 PROCEDURE — 99214 OFFICE O/P EST MOD 30 MIN: CPT | Performed by: FAMILY MEDICINE

## 2020-08-31 NOTE — PATIENT INSTRUCTIONS
Continue your current medications and treatment.    Follow up in the office in 1 year.    Laboratory testing at that time.

## 2020-08-31 NOTE — PROGRESS NOTES
"Subjective   Robin Wheatley is a 60 y.o. male.     Chief Complaint   Patient presents with   • Hypertension     6 month follow up   • Hypothyroidism       HPI  Chief complaint: Hypertension hypothyroidism allergic rhinitis testicular hypofunction arthritis vertigo    Patient is a 60-year-old white male comes in for follow-up and maintenance of his current problems which include    1.  Hypertension-stable-patient on olmesartan 20 mg daily.  He denied headache lightheadedness dizziness or chest pain.  His blood pressures well controlled.  Patient is gradually tapering off the medication.    2.  Hypothyroidism-stable-the patient is on [place] Thyroid 60 mg daily.  He denied heat or cold intolerance.  Denied tremor weight gain or weight loss.    3.  Testicular hypofunction-stable-patient is currently on testosterone replacement therapy and Arimidex 1 mg daily.  He states this helps with his stamina and strength and libido.  He is currently being managed by endocrinology.    4.  Allergic rhinitis-stable-patient on Singulair 10 mg daily Flonase and antihistamines.  Denies sinus congestion drainage cough shortness of breath or wheezing.    5.  Vertigo-stable-patient is currently on Trental 400 mg twice a day and Antivert.  He denied recent episodes of vertigo.    6.  Arthritis-stable-patient on Mobic and tramadol.  He states this helps to control his joint pain and stiffness.        The following portions of the patient's history were reviewed and updated as appropriate: allergies, current medications, past family history, past medical history, past social history, past surgical history and problem list.    Review of Systems    Objective     /73 (BP Location: Left arm, Patient Position: Sitting, Cuff Size: Large Adult)   Pulse 67   Temp 97.5 °F (36.4 °C) (Infrared)   Resp 20   Ht 190.5 cm (75\")   Wt 115 kg (253 lb)   SpO2 98%   BMI 31.62 kg/m²     Physical Exam   Constitutional: He is oriented to person, " place, and time. He appears well-developed and well-nourished.   HENT:   Head: Normocephalic and atraumatic.   Eyes: Pupils are equal, round, and reactive to light. Conjunctivae and EOM are normal.   Neck: Normal range of motion. Neck supple.   Cardiovascular: Normal rate, regular rhythm, normal heart sounds and intact distal pulses.   Pulmonary/Chest: Effort normal and breath sounds normal.   Abdominal: Soft. Bowel sounds are normal.   Musculoskeletal: Normal range of motion.   Neurological: He is alert and oriented to person, place, and time.   Skin: Skin is warm and dry.   Psychiatric: He has a normal mood and affect. His behavior is normal.   Nursing note and vitals reviewed.        Assessment/Plan   Robin was seen today for hypertension and hypothyroidism.    Diagnoses and all orders for this visit:    Benign essential hypertension    Testicular hypofunction    Acquired hypothyroidism    Vertigo    Arthritis      Patient Instructions   Continue your current medications and treatment.    Follow up in the office in 1 year.    Laboratory testing at that time.          Marlo Michaels Jr., MD    08/31/20

## 2020-11-24 ENCOUNTER — TELEPHONE (OUTPATIENT)
Dept: FAMILY MEDICINE CLINIC | Facility: CLINIC | Age: 60
End: 2020-11-24

## 2020-11-24 RX ORDER — MELOXICAM 15 MG/1
15 TABLET ORAL DAILY
Qty: 14 TABLET | Refills: 0 | Status: SHIPPED | OUTPATIENT
Start: 2020-11-24 | End: 2021-09-28

## 2020-11-24 RX ORDER — MELOXICAM 15 MG/1
15 TABLET ORAL DAILY
Qty: 90 TABLET | Refills: 2 | Status: SHIPPED | OUTPATIENT
Start: 2020-11-24 | End: 2020-11-24 | Stop reason: SDUPTHER

## 2021-01-07 ENCOUNTER — PREP FOR SURGERY (OUTPATIENT)
Dept: OTHER | Facility: HOSPITAL | Age: 61
End: 2021-01-07

## 2021-01-07 ENCOUNTER — OFFICE VISIT (OUTPATIENT)
Dept: ORTHOPEDIC SURGERY | Facility: CLINIC | Age: 61
End: 2021-01-07

## 2021-01-07 VITALS
DIASTOLIC BLOOD PRESSURE: 74 MMHG | SYSTOLIC BLOOD PRESSURE: 121 MMHG | BODY MASS INDEX: 32.08 KG/M2 | HEART RATE: 63 BPM | WEIGHT: 258 LBS | HEIGHT: 75 IN

## 2021-01-07 DIAGNOSIS — M19.012 OSTEOARTHRITIS OF LEFT GLENOHUMERAL JOINT: Primary | ICD-10-CM

## 2021-01-07 DIAGNOSIS — M75.122 COMPLETE TEAR OF LEFT ROTATOR CUFF, UNSPECIFIED WHETHER TRAUMATIC: ICD-10-CM

## 2021-01-07 DIAGNOSIS — M25.512 ACUTE PAIN OF LEFT SHOULDER: Primary | ICD-10-CM

## 2021-01-07 DIAGNOSIS — M19.012 OSTEOARTHRITIS OF LEFT GLENOHUMERAL JOINT: ICD-10-CM

## 2021-01-07 PROBLEM — M19.019 DJD OF SHOULDER: Status: ACTIVE | Noted: 2021-01-07

## 2021-01-07 PROCEDURE — 99244 OFF/OP CNSLTJ NEW/EST MOD 40: CPT | Performed by: ORTHOPAEDIC SURGERY

## 2021-01-07 RX ORDER — CEFAZOLIN SODIUM IN 0.9 % NACL 3 G/100 ML
3 INTRAVENOUS SOLUTION, PIGGYBACK (ML) INTRAVENOUS ONCE
Status: CANCELLED | OUTPATIENT
Start: 2021-01-07 | End: 2021-01-07

## 2021-01-07 RX ORDER — MELOXICAM 15 MG/1
15 TABLET ORAL ONCE
Status: CANCELLED | OUTPATIENT
Start: 2021-01-07 | End: 2021-01-07

## 2021-01-07 RX ORDER — PREGABALIN 75 MG/1
150 CAPSULE ORAL ONCE
Status: CANCELLED | OUTPATIENT
Start: 2021-01-07 | End: 2021-01-07

## 2021-01-07 RX ORDER — OXYCODONE HCL 10 MG/1
10 TABLET, FILM COATED, EXTENDED RELEASE ORAL ONCE
Status: CANCELLED | OUTPATIENT
Start: 2021-01-07 | End: 2021-01-07

## 2021-01-07 NOTE — H&P (VIEW-ONLY)
"     Patient ID: Robin Wheatley is a 60 y.o. male.    Chief Complaint:    Chief Complaint   Patient presents with   • Left Shoulder - Pain     MRI @ [facility]       HPI:  Robin is a 60-year-old gentleman here referred by Dr. Lawson with longstanding left shoulder pain.  He had rotator cuff repair on that side about 4 to 5 years ago and initially did well.  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  Pain is deep in the shoulder with pain at night.  He has taken some oral medication including meloxicam with little relief.  Pain is sharp and a 7/10  Past Medical History:   Diagnosis Date   • Allergic    • Hypertension    • Sleep apnea    • Testicular hypofunction    • Vertigo        Past Surgical History:   Procedure Laterality Date   • COLON SURGERY     • QUADRICEPS TENDON REPAIR     • SEPTOPLASTY     • SHOULDER SURGERY Bilateral    • SINUS SURGERY         Family History   Problem Relation Age of Onset   • Cancer Mother           Social History     Occupational History   • Not on file   Tobacco Use   • Smoking status: Former Smoker     Packs/day: 0.50     Quit date:      Years since quittin.0   • Smokeless tobacco: Never Used   Substance and Sexual Activity   • Alcohol use: No     Frequency: Never   • Drug use: Never   • Sexual activity: Defer      Review of Systems   Cardiovascular: Negative for chest pain.   Musculoskeletal: Positive for arthralgias.       Objective:    /74   Pulse 63   Ht 190.5 cm (75\")   Wt 117 kg (258 lb)   BMI 32.25 kg/m²     Physical Examination:  He is a pleasant male in no distress. He is alert and oriented x3 and appears his stated age.  Left shoulder demonstrates a healed incision over the anterior deltoid from open cuff repair.  There is mild supraspinatus atrophy with pain over the bicep groove and impingement area.  Passive elevation 160 degrees abduction 130 degrees external rotation 40 degrees internal rotation S1.  " He has pain weakness on Speed, [place], supraspinatus testing.  Belly press and liftoff are 4/5 and 3/5.  Active elevation is 140 degrees with intact deltoid function.Sensory and motor exam are intact all distributions. Radial pulse is palpable and capillary refill is less than two seconds to all digits    Imaging:  left Shoulder X-Ray  Indication: Chronic shoulder pain, history of cuff repair  AP Y and Lateral views  Findings: Mild to moderate degenerative joint disease with signs of absorbable anchor insertion  no bony lesion  Soft tissues normal  decreased joint spaces  Hardware appropriately positioned not applicable      no prior studies available for comparison    , MRI demonstrates absorbable anchor insertion in the proximal humerus with full-thickness retracted tear of the supraspinatus to the glenoid with full-thickness upper subscapularis tear, poorly visualized bicep and moderate degenerative joint disease with moderate cuff atrophy    Assessment:  Left shoulder recurrent cuff tear with arthritis    Plan:  Treatment options discussed.  I believe because of his previous repair, the atrophy, and retraction of this tear that he has an irreparable cuff tear.  Options at this point would be possible grafting and capsular reconstruction versus reverse arthroplasty.  I discussed the pros and cons of each.  At this point he would like to proceed with reverse total shoulder arthroplasty  I discussed the risks including bleeding, scar, infection, stiffness, nerve artery vein and tendon damage, instability, dvt, loss of life or limb. Issues of scapular notching and component revision were also discussed. All questions were answered and addressed. Rehabilitation restrictions and timeframes were discussed. Use of cement or cementless fixation was discussed.      Procedures         Disclaimer: Please note that areas of this note were completed with computer voice recognition software.  Quite often unanticipated  grammatical, syntax, homophones, and other interpretive errors are inadvertently transcribed by the computer software. Please excuse any errors that have escaped final proofreading.

## 2021-01-07 NOTE — PROGRESS NOTES
"     Patient ID: Robin Wheatley is a 60 y.o. male.    Chief Complaint:    Chief Complaint   Patient presents with   • Left Shoulder - Pain     MRI @ [facility]       HPI:  Robin is a 60-year-old gentleman here referred by Dr. Lawson with longstanding left shoulder pain.  He had rotator cuff repair on that side about 4 to 5 years ago and initially did well.  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  Pain is deep in the shoulder with pain at night.  He has taken some oral medication including meloxicam with little relief.  Pain is sharp and a 7/10  Past Medical History:   Diagnosis Date   • Allergic    • Hypertension    • Sleep apnea    • Testicular hypofunction    • Vertigo        Past Surgical History:   Procedure Laterality Date   • COLON SURGERY     • QUADRICEPS TENDON REPAIR     • SEPTOPLASTY     • SHOULDER SURGERY Bilateral    • SINUS SURGERY         Family History   Problem Relation Age of Onset   • Cancer Mother           Social History     Occupational History   • Not on file   Tobacco Use   • Smoking status: Former Smoker     Packs/day: 0.50     Quit date:      Years since quittin.0   • Smokeless tobacco: Never Used   Substance and Sexual Activity   • Alcohol use: No     Frequency: Never   • Drug use: Never   • Sexual activity: Defer      Review of Systems   Cardiovascular: Negative for chest pain.   Musculoskeletal: Positive for arthralgias.       Objective:    /74   Pulse 63   Ht 190.5 cm (75\")   Wt 117 kg (258 lb)   BMI 32.25 kg/m²     Physical Examination:  He is a pleasant male in no distress. He is alert and oriented x3 and appears his stated age.  Left shoulder demonstrates a healed incision over the anterior deltoid from open cuff repair.  There is mild supraspinatus atrophy with pain over the bicep groove and impingement area.  Passive elevation 160 degrees abduction 130 degrees external rotation 40 degrees internal rotation S1.  " He has pain weakness on Speed, [place], supraspinatus testing.  Belly press and liftoff are 4/5 and 3/5.  Active elevation is 140 degrees with intact deltoid function.Sensory and motor exam are intact all distributions. Radial pulse is palpable and capillary refill is less than two seconds to all digits    Imaging:  left Shoulder X-Ray  Indication: Chronic shoulder pain, history of cuff repair  AP Y and Lateral views  Findings: Mild to moderate degenerative joint disease with signs of absorbable anchor insertion  no bony lesion  Soft tissues normal  decreased joint spaces  Hardware appropriately positioned not applicable      no prior studies available for comparison    , MRI demonstrates absorbable anchor insertion in the proximal humerus with full-thickness retracted tear of the supraspinatus to the glenoid with full-thickness upper subscapularis tear, poorly visualized bicep and moderate degenerative joint disease with moderate cuff atrophy    Assessment:  Left shoulder recurrent cuff tear with arthritis    Plan:  Treatment options discussed.  I believe because of his previous repair, the atrophy, and retraction of this tear that he has an irreparable cuff tear.  Options at this point would be possible grafting and capsular reconstruction versus reverse arthroplasty.  I discussed the pros and cons of each.  At this point he would like to proceed with reverse total shoulder arthroplasty  I discussed the risks including bleeding, scar, infection, stiffness, nerve artery vein and tendon damage, instability, dvt, loss of life or limb. Issues of scapular notching and component revision were also discussed. All questions were answered and addressed. Rehabilitation restrictions and timeframes were discussed. Use of cement or cementless fixation was discussed.      Procedures         Disclaimer: Please note that areas of this note were completed with computer voice recognition software.  Quite often unanticipated  grammatical, syntax, homophones, and other interpretive errors are inadvertently transcribed by the computer software. Please excuse any errors that have escaped final proofreading.

## 2021-01-11 ENCOUNTER — TRANSCRIBE ORDERS (OUTPATIENT)
Dept: ADMINISTRATIVE | Facility: HOSPITAL | Age: 61
End: 2021-01-11

## 2021-01-11 ENCOUNTER — TELEPHONE (OUTPATIENT)
Dept: ORTHOPEDIC SURGERY | Facility: CLINIC | Age: 61
End: 2021-01-11

## 2021-01-11 DIAGNOSIS — Z01.818 OTHER SPECIFIED PRE-OPERATIVE EXAMINATION: Primary | ICD-10-CM

## 2021-01-11 RX ORDER — PRASTERONE (DHEA) 25 MG
1 CAPSULE ORAL DAILY
COMMUNITY

## 2021-01-11 RX ORDER — HYDROCORTISONE ACETATE 0.5 %
2 CREAM (GRAM) TOPICAL 2 TIMES DAILY
Status: ON HOLD | COMMUNITY
End: 2022-08-24

## 2021-01-11 RX ORDER — MULTIPLE VITAMINS W/ MINERALS TAB 9MG-400MCG
1 TAB ORAL DAILY
COMMUNITY

## 2021-01-11 RX ORDER — MULTIVIT-MIN/IRON/FA/VIT K/LUT 8MG-400MCG
1 TABLET ORAL 2 TIMES DAILY
COMMUNITY

## 2021-01-11 NOTE — TELEPHONE ENCOUNTER
Provider: DR. JONES  Caller: OLGA MANRIQUE  Relationship to Patient: SELF  Phone Number: [phone]  Reason for Call: PT ADVISED DUE TO INSURANCE REASONS, HE NEEDS TO KNOW HOW MANY HOURS HE WILL BE OBSERVED AFTER SX. HE ADVISED HIS INSURANCE TOLD HIM ANYTHING LESS THAN 23 HOURS LESS, THEN HE WILL NEED TO TAKE 5 DAYS OF HIS VACATION TIME, ANYTHING OVER 24 HOURS, HE WILL BE COVERED. CAN BE REACHED ANY DAY  BEFORE [phone]; OTHERWISE, CAN LEAVE A VOICE MSG.

## 2021-01-12 ENCOUNTER — LAB (OUTPATIENT)
Dept: LAB | Facility: HOSPITAL | Age: 61
End: 2021-01-12

## 2021-01-12 ENCOUNTER — HOSPITAL ENCOUNTER (OUTPATIENT)
Dept: CARDIOLOGY | Facility: HOSPITAL | Age: 61
Discharge: HOME | End: 2021-01-12

## 2021-01-12 ENCOUNTER — HOSPITAL ENCOUNTER (OUTPATIENT)
Dept: GENERAL RADIOLOGY | Facility: HOSPITAL | Age: 61
Discharge: HOME | End: 2021-01-12

## 2021-01-12 ENCOUNTER — APPOINTMENT (OUTPATIENT)
Dept: LAB | Facility: HOSPITAL | Age: 61
End: 2021-01-12

## 2021-01-12 DIAGNOSIS — M19.012 OSTEOARTHRITIS OF LEFT GLENOHUMERAL JOINT: ICD-10-CM

## 2021-01-12 LAB
ABO GROUP BLD: NORMAL
ANION GAP SERPL CALCULATED.3IONS-SCNC: 9.6 MMOL/L (ref 5–15)
APTT PPP: 27.8 SECONDS (ref 24–31)
BASOPHILS # BLD AUTO: 0.06 10*3/MM3 (ref 0–0.2)
BASOPHILS NFR BLD AUTO: 1.2 % (ref 0–1.5)
BLD GP AB SCN SERPL QL: NEGATIVE
BUN SERPL-MCNC: 23 MG/DL (ref 8–23)
BUN/CREAT SERPL: 33.3 (ref 7–25)
CALCIUM SPEC-SCNC: 9.2 MG/DL (ref 8.6–10.5)
CHLORIDE SERPL-SCNC: 106 MMOL/L (ref 98–107)
CO2 SERPL-SCNC: 26.4 MMOL/L (ref 22–29)
CREAT SERPL-MCNC: 0.69 MG/DL (ref 0.76–1.27)
DEPRECATED RDW RBC AUTO: 39.3 FL (ref 37–54)
EOSINOPHIL # BLD AUTO: 0.22 10*3/MM3 (ref 0–0.4)
EOSINOPHIL NFR BLD AUTO: 4.5 % (ref 0.3–6.2)
ERYTHROCYTE [DISTWIDTH] IN BLOOD BY AUTOMATED COUNT: 11.8 % (ref 12.3–15.4)
GFR SERPL CREATININE-BSD FRML MDRD: 117 ML/MIN/1.73
GLUCOSE SERPL-MCNC: 72 MG/DL (ref 65–99)
HBA1C MFR BLD: 5.5 % (ref 3.5–5.6)
HCT VFR BLD AUTO: 50.6 % (ref 37.5–51)
HGB BLD-MCNC: 17.2 G/DL (ref 13–17.7)
IMM GRANULOCYTES # BLD AUTO: 0.03 10*3/MM3 (ref 0–0.05)
IMM GRANULOCYTES NFR BLD AUTO: 0.6 % (ref 0–0.5)
INR PPP: 1 (ref 0.93–1.1)
LYMPHOCYTES # BLD AUTO: 1.05 10*3/MM3 (ref 0.7–3.1)
LYMPHOCYTES NFR BLD AUTO: 21.4 % (ref 19.6–45.3)
MCH RBC QN AUTO: 31.2 PG (ref 26.6–33)
MCHC RBC AUTO-ENTMCNC: 34 G/DL (ref 31.5–35.7)
MCV RBC AUTO: 91.8 FL (ref 79–97)
MONOCYTES # BLD AUTO: 0.38 10*3/MM3 (ref 0.1–0.9)
MONOCYTES NFR BLD AUTO: 7.7 % (ref 5–12)
NEUTROPHILS NFR BLD AUTO: 3.17 10*3/MM3 (ref 1.7–7)
NEUTROPHILS NFR BLD AUTO: 64.6 % (ref 42.7–76)
NRBC BLD AUTO-RTO: 0 /100 WBC (ref 0–0.2)
PLATELET # BLD AUTO: 127 10*3/MM3 (ref 140–450)
PMV BLD AUTO: 11.8 FL (ref 6–12)
POTASSIUM SERPL-SCNC: 4.4 MMOL/L (ref 3.5–5.2)
PROTHROMBIN TIME: 11 SECONDS (ref 9.6–11.7)
RBC # BLD AUTO: 5.51 10*6/MM3 (ref 4.14–5.8)
RH BLD: POSITIVE
SODIUM SERPL-SCNC: 142 MMOL/L (ref 136–145)
T&S EXPIRATION DATE: NORMAL
WBC # BLD AUTO: 4.91 10*3/MM3 (ref 3.4–10.8)

## 2021-01-12 PROCEDURE — 86901 BLOOD TYPING SEROLOGIC RH(D): CPT

## 2021-01-12 PROCEDURE — 36415 COLL VENOUS BLD VENIPUNCTURE: CPT

## 2021-01-12 PROCEDURE — 86900 BLOOD TYPING SEROLOGIC ABO: CPT

## 2021-01-12 PROCEDURE — 80048 BASIC METABOLIC PNL TOTAL CA: CPT

## 2021-01-12 PROCEDURE — 86850 RBC ANTIBODY SCREEN: CPT

## 2021-01-12 PROCEDURE — 83036 HEMOGLOBIN GLYCOSYLATED A1C: CPT

## 2021-01-12 PROCEDURE — 85025 COMPLETE CBC W/AUTO DIFF WBC: CPT

## 2021-01-12 PROCEDURE — 93010 ELECTROCARDIOGRAM REPORT: CPT | Performed by: INTERNAL MEDICINE

## 2021-01-12 PROCEDURE — 85730 THROMBOPLASTIN TIME PARTIAL: CPT

## 2021-01-12 PROCEDURE — 85610 PROTHROMBIN TIME: CPT

## 2021-01-12 PROCEDURE — 71046 X-RAY EXAM CHEST 2 VIEWS: CPT

## 2021-01-12 PROCEDURE — 93005 ELECTROCARDIOGRAM TRACING: CPT | Performed by: ORTHOPAEDIC SURGERY

## 2021-01-15 LAB — QT INTERVAL: 413 MS

## 2021-01-16 ENCOUNTER — LAB (OUTPATIENT)
Dept: LAB | Facility: HOSPITAL | Age: 61
End: 2021-01-16

## 2021-01-16 DIAGNOSIS — M25.512 ACUTE PAIN OF LEFT SHOULDER: ICD-10-CM

## 2021-01-16 LAB — SARS-COV-2 ORF1AB RESP QL NAA+PROBE: NOT DETECTED

## 2021-01-16 PROCEDURE — U0004 COV-19 TEST NON-CDC HGH THRU: HCPCS

## 2021-01-16 PROCEDURE — C9803 HOPD COVID-19 SPEC COLLECT: HCPCS

## 2021-01-18 ENCOUNTER — ANESTHESIA EVENT (OUTPATIENT)
Dept: PERIOP | Facility: HOSPITAL | Age: 61
End: 2021-01-18

## 2021-01-18 NOTE — ANESTHESIA PREPROCEDURE EVALUATION
Anesthesia Evaluation     Patient summary reviewed and Nursing notes reviewed   history of anesthetic complications: difficult airway  NPO Solid Status: > 8 hours  NPO Liquid Status: > 8 hours           Airway   Dental      Pulmonary    (+) a smoker Former, sleep apnea on CPAP,   Cardiovascular     ECG reviewed    (+) hypertension,       Neuro/Psych  (+) dizziness/light headedness,     GI/Hepatic/Renal/Endo    (+) obesity,   renal disease stones, thyroid problem hypothyroidism    Musculoskeletal     Abdominal    Substance History      OB/GYN          Other   arthritis,      ROS/Med Hx Other: Testicular hypofunction, vertigo, allergies    PSH  SHOULDER SURGERY QUADRICEPS TENDON REPAIR  COLON SURGERY SINUS SURGERY  SEPTOPLASTY                   Anesthesia Plan    ASA 3     general with block   (Patient identified; pre-operative vital signs, all relevant labs/studies, complete medical/surgical/anesthetic history, full medication list, full allergy list, and NPO status obtained/reviewed; physical assessment performed; anesthetic options, side effects, potential complications, risks, and benefits discussed; questions answered; written anesthesia consent obtained; patient cleared for procedure; anesthesia machine and equipment checked and functioning)  intravenous induction     Anesthetic plan, all risks, benefits, and alternatives have been provided, discussed and informed consent has been obtained with: patient.    Plan discussed with CRNA and CAA.

## 2021-01-19 ENCOUNTER — ANESTHESIA (OUTPATIENT)
Dept: PERIOP | Facility: HOSPITAL | Age: 61
End: 2021-01-19

## 2021-01-19 ENCOUNTER — APPOINTMENT (OUTPATIENT)
Dept: GENERAL RADIOLOGY | Facility: HOSPITAL | Age: 61
End: 2021-01-19

## 2021-01-19 ENCOUNTER — HOSPITAL ENCOUNTER (OUTPATIENT)
Facility: HOSPITAL | Age: 61
Discharge: HOME | End: 2021-01-20
Attending: ORTHOPAEDIC SURGERY | Admitting: ORTHOPAEDIC SURGERY

## 2021-01-19 DIAGNOSIS — M19.012 OSTEOARTHRITIS OF LEFT GLENOHUMERAL JOINT: ICD-10-CM

## 2021-01-19 PROBLEM — Z87.891 FORMER SMOKER: Status: ACTIVE | Noted: 2021-01-19

## 2021-01-19 PROBLEM — G47.30 SLEEP APNEA: Chronic | Status: ACTIVE | Noted: 2019-07-23

## 2021-01-19 PROBLEM — E66.9 OBESITY (BMI 30-39.9): Chronic | Status: ACTIVE | Noted: 2019-04-10

## 2021-01-19 PROBLEM — I10 BENIGN ESSENTIAL HYPERTENSION: Chronic | Status: ACTIVE | Noted: 2017-02-03

## 2021-01-19 PROBLEM — D69.6 THROMBOCYTOPENIA (HCC): Chronic | Status: ACTIVE | Noted: 2021-01-19

## 2021-01-19 PROBLEM — E03.9 ACQUIRED HYPOTHYROIDISM: Chronic | Status: ACTIVE | Noted: 2019-08-12

## 2021-01-19 PROCEDURE — 25010000002 ONDANSETRON PER 1 MG: Performed by: NURSE ANESTHETIST, CERTIFIED REGISTERED

## 2021-01-19 PROCEDURE — G0378 HOSPITAL OBSERVATION PER HR: HCPCS

## 2021-01-19 PROCEDURE — 25010000003 BUPIVACAINE LIPOSOME 1.3 % SUSPENSION: Performed by: ANESTHESIOLOGY

## 2021-01-19 PROCEDURE — 99219 PR INITIAL OBSERVATION CARE/DAY 50 MINUTES: CPT | Performed by: NURSE PRACTITIONER

## 2021-01-19 PROCEDURE — 97166 OT EVAL MOD COMPLEX 45 MIN: CPT

## 2021-01-19 PROCEDURE — 25010000002 CEFAZOLIN PER 500 MG: Performed by: ORTHOPAEDIC SURGERY

## 2021-01-19 PROCEDURE — 25010000002 FENTANYL CITRATE (PF) 100 MCG/2ML SOLUTION: Performed by: NURSE ANESTHETIST, CERTIFIED REGISTERED

## 2021-01-19 PROCEDURE — 97535 SELF CARE MNGMENT TRAINING: CPT

## 2021-01-19 PROCEDURE — 23472 RECONSTRUCT SHOULDER JOINT: CPT | Performed by: ORTHOPAEDIC SURGERY

## 2021-01-19 PROCEDURE — 25010000002 DEXAMETHASONE PER 1 MG: Performed by: NURSE ANESTHETIST, CERTIFIED REGISTERED

## 2021-01-19 PROCEDURE — 97110 THERAPEUTIC EXERCISES: CPT

## 2021-01-19 PROCEDURE — 25010000002 VANCOMYCIN 1 G RECONSTITUTED SOLUTION 1 EACH VIAL: Performed by: ORTHOPAEDIC SURGERY

## 2021-01-19 PROCEDURE — C9290 INJ, BUPIVACAINE LIPOSOME: HCPCS | Performed by: ANESTHESIOLOGY

## 2021-01-19 PROCEDURE — 25010000002 HYDROMORPHONE PER 4 MG: Performed by: NURSE ANESTHETIST, CERTIFIED REGISTERED

## 2021-01-19 PROCEDURE — 73030 X-RAY EXAM OF SHOULDER: CPT

## 2021-01-19 PROCEDURE — 25010000003 LIDOCAINE 1 % SOLUTION 20 ML VIAL: Performed by: ORTHOPAEDIC SURGERY

## 2021-01-19 PROCEDURE — C1776 JOINT DEVICE (IMPLANTABLE): HCPCS | Performed by: ORTHOPAEDIC SURGERY

## 2021-01-19 PROCEDURE — 25010000002 CEFTRIAXONE PER 250 MG: Performed by: ORTHOPAEDIC SURGERY

## 2021-01-19 PROCEDURE — 25010000002 PROPOFOL 10 MG/ML EMULSION: Performed by: NURSE ANESTHETIST, CERTIFIED REGISTERED

## 2021-01-19 PROCEDURE — 76942 ECHO GUIDE FOR BIOPSY: CPT | Performed by: ORTHOPAEDIC SURGERY

## 2021-01-19 DEVICE — BEAR HUM PROLNG STD 40MM: Type: IMPLANTABLE DEVICE | Site: SHOULDER | Status: FUNCTIONAL

## 2021-01-19 DEVICE — GLENSPHR TRABECULARMETAL REV 40MM: Type: IMPLANTABLE DEVICE | Site: SHOULDER | Status: FUNCTIONAL

## 2021-01-19 DEVICE — STEM HUM/SHLDR COMPREHENSIVE MINI 13X83MM: Type: IMPLANTABLE DEVICE | Site: SHOULDER | Status: FUNCTIONAL

## 2021-01-19 DEVICE — TOTL SHLDER REV: Type: IMPLANTABLE DEVICE | Site: SHOULDER | Status: FUNCTIONAL

## 2021-01-19 DEVICE — INVERSE/REVERSE SCREW SYSTEM, 4.5-36
Type: IMPLANTABLE DEVICE | Site: SHOULDER | Status: FUNCTIONAL
Brand: INVERSE/REVERSE

## 2021-01-19 DEVICE — BASEPLT GLEN TRABECULARMETAL REV 15MM: Type: IMPLANTABLE DEVICE | Site: SHOULDER | Status: FUNCTIONAL

## 2021-01-19 DEVICE — INVERSE/REVERSE SCREW SYSTEM, 4.5-30
Type: IMPLANTABLE DEVICE | Site: SHOULDER | Status: FUNCTIONAL
Brand: INVERSE/REVERSE

## 2021-01-19 DEVICE — TRY HUM/SHLDR COMPREHENSIVE/REVERSE MINI COCR STD PLS 5MM: Type: IMPLANTABLE DEVICE | Site: SHOULDER | Status: FUNCTIONAL

## 2021-01-19 RX ORDER — PREGABALIN 75 MG/1
150 CAPSULE ORAL ONCE
Status: COMPLETED | OUTPATIENT
Start: 2021-01-19 | End: 2021-01-19

## 2021-01-19 RX ORDER — EPHEDRINE SULFATE/0.9% NACL/PF 25 MG/5 ML
SYRINGE (ML) INTRAVENOUS AS NEEDED
Status: DISCONTINUED | OUTPATIENT
Start: 2021-01-19 | End: 2021-01-19 | Stop reason: SURG

## 2021-01-19 RX ORDER — BISACODYL 10 MG
10 SUPPOSITORY, RECTAL RECTAL DAILY PRN
Status: DISCONTINUED | OUTPATIENT
Start: 2021-01-19 | End: 2021-01-20 | Stop reason: HOSPADM

## 2021-01-19 RX ORDER — FENTANYL CITRATE 50 UG/ML
INJECTION, SOLUTION INTRAMUSCULAR; INTRAVENOUS AS NEEDED
Status: DISCONTINUED | OUTPATIENT
Start: 2021-01-19 | End: 2021-01-19 | Stop reason: SURG

## 2021-01-19 RX ORDER — LOSARTAN POTASSIUM 25 MG/1
25 TABLET ORAL
Status: DISCONTINUED | OUTPATIENT
Start: 2021-01-20 | End: 2021-01-20 | Stop reason: HOSPADM

## 2021-01-19 RX ORDER — ASPIRIN 325 MG
325 TABLET, DELAYED RELEASE (ENTERIC COATED) ORAL DAILY
Status: DISCONTINUED | OUTPATIENT
Start: 2021-01-20 | End: 2021-01-19

## 2021-01-19 RX ORDER — LEVOTHYROXINE AND LIOTHYRONINE 19; 4.5 UG/1; UG/1
60 TABLET ORAL
Status: DISCONTINUED | OUTPATIENT
Start: 2021-01-19 | End: 2021-01-20 | Stop reason: HOSPADM

## 2021-01-19 RX ORDER — LIDOCAINE HYDROCHLORIDE 10 MG/ML
0.5 INJECTION, SOLUTION INFILTRATION; PERINEURAL ONCE AS NEEDED
Status: DISCONTINUED | OUTPATIENT
Start: 2021-01-19 | End: 2021-01-19 | Stop reason: HOSPADM

## 2021-01-19 RX ORDER — LIDOCAINE HYDROCHLORIDE 10 MG/ML
INJECTION, SOLUTION EPIDURAL; INFILTRATION; INTRACAUDAL; PERINEURAL AS NEEDED
Status: DISCONTINUED | OUTPATIENT
Start: 2021-01-19 | End: 2021-01-19 | Stop reason: SURG

## 2021-01-19 RX ORDER — DIPHENHYDRAMINE HCL 25 MG
25 CAPSULE ORAL NIGHTLY PRN
Status: DISCONTINUED | OUTPATIENT
Start: 2021-01-19 | End: 2021-01-20 | Stop reason: HOSPADM

## 2021-01-19 RX ORDER — DIPHENHYDRAMINE HCL 25 MG
25 CAPSULE ORAL EVERY 6 HOURS PRN
Status: DISCONTINUED | OUTPATIENT
Start: 2021-01-19 | End: 2021-01-20 | Stop reason: HOSPADM

## 2021-01-19 RX ORDER — ASPIRIN 325 MG
325 TABLET, DELAYED RELEASE (ENTERIC COATED) ORAL DAILY
Status: DISCONTINUED | OUTPATIENT
Start: 2021-01-19 | End: 2021-01-20 | Stop reason: HOSPADM

## 2021-01-19 RX ORDER — ACETAMINOPHEN 650 MG/1
650 SUPPOSITORY RECTAL ONCE AS NEEDED
Status: DISCONTINUED | OUTPATIENT
Start: 2021-01-19 | End: 2021-01-19 | Stop reason: HOSPADM

## 2021-01-19 RX ORDER — ACETAMINOPHEN 650 MG/1
650 SUPPOSITORY RECTAL EVERY 4 HOURS PRN
Status: DISCONTINUED | OUTPATIENT
Start: 2021-01-19 | End: 2021-01-20 | Stop reason: HOSPADM

## 2021-01-19 RX ORDER — SODIUM CHLORIDE, SODIUM LACTATE, POTASSIUM CHLORIDE, CALCIUM CHLORIDE 600; 310; 30; 20 MG/100ML; MG/100ML; MG/100ML; MG/100ML
INJECTION, SOLUTION INTRAVENOUS CONTINUOUS PRN
Status: DISCONTINUED | OUTPATIENT
Start: 2021-01-19 | End: 2021-01-19 | Stop reason: SURG

## 2021-01-19 RX ORDER — ONDANSETRON 2 MG/ML
4 INJECTION INTRAMUSCULAR; INTRAVENOUS ONCE AS NEEDED
Status: DISCONTINUED | OUTPATIENT
Start: 2021-01-19 | End: 2021-01-19 | Stop reason: HOSPADM

## 2021-01-19 RX ORDER — TRAMADOL HYDROCHLORIDE 50 MG/1
50 TABLET ORAL EVERY 6 HOURS PRN
Status: DISCONTINUED | OUTPATIENT
Start: 2021-01-19 | End: 2021-01-20 | Stop reason: HOSPADM

## 2021-01-19 RX ORDER — ACETAMINOPHEN 325 MG/1
650 TABLET ORAL ONCE AS NEEDED
Status: DISCONTINUED | OUTPATIENT
Start: 2021-01-19 | End: 2021-01-19 | Stop reason: HOSPADM

## 2021-01-19 RX ORDER — KETAMINE HYDROCHLORIDE 10 MG/ML
INJECTION INTRAMUSCULAR; INTRAVENOUS AS NEEDED
Status: DISCONTINUED | OUTPATIENT
Start: 2021-01-19 | End: 2021-01-19 | Stop reason: SURG

## 2021-01-19 RX ORDER — SODIUM CHLORIDE 0.9 % (FLUSH) 0.9 %
10 SYRINGE (ML) INJECTION AS NEEDED
Status: DISCONTINUED | OUTPATIENT
Start: 2021-01-19 | End: 2021-01-19 | Stop reason: HOSPADM

## 2021-01-19 RX ORDER — SODIUM CHLORIDE 0.9 % (FLUSH) 0.9 %
10 SYRINGE (ML) INJECTION EVERY 12 HOURS SCHEDULED
Status: DISCONTINUED | OUTPATIENT
Start: 2021-01-19 | End: 2021-01-19 | Stop reason: HOSPADM

## 2021-01-19 RX ORDER — CEFAZOLIN SODIUM IN 0.9 % NACL 3 G/100 ML
3 INTRAVENOUS SOLUTION, PIGGYBACK (ML) INTRAVENOUS ONCE
Status: DISCONTINUED | OUTPATIENT
Start: 2021-01-19 | End: 2021-01-19 | Stop reason: HOSPADM

## 2021-01-19 RX ORDER — GLYCOPYRROLATE 1 MG/5 ML
SYRINGE (ML) INTRAVENOUS AS NEEDED
Status: DISCONTINUED | OUTPATIENT
Start: 2021-01-19 | End: 2021-01-19 | Stop reason: SURG

## 2021-01-19 RX ORDER — SODIUM CHLORIDE 9 MG/ML
9 INJECTION, SOLUTION INTRAVENOUS CONTINUOUS PRN
Status: DISCONTINUED | OUTPATIENT
Start: 2021-01-19 | End: 2021-01-20 | Stop reason: HOSPADM

## 2021-01-19 RX ORDER — CETIRIZINE HYDROCHLORIDE 10 MG/1
10 TABLET ORAL DAILY
Status: DISCONTINUED | OUTPATIENT
Start: 2021-01-19 | End: 2021-01-20 | Stop reason: HOSPADM

## 2021-01-19 RX ORDER — OXYCODONE HCL 10 MG/1
10 TABLET, FILM COATED, EXTENDED RELEASE ORAL ONCE
Status: COMPLETED | OUTPATIENT
Start: 2021-01-19 | End: 2021-01-19

## 2021-01-19 RX ORDER — HYDROMORPHONE HCL 110MG/55ML
1 PATIENT CONTROLLED ANALGESIA SYRINGE INTRAVENOUS
Status: DISCONTINUED | OUTPATIENT
Start: 2021-01-19 | End: 2021-01-19 | Stop reason: HOSPADM

## 2021-01-19 RX ORDER — OXYCODONE HCL 20 MG/1
20 TABLET, FILM COATED, EXTENDED RELEASE ORAL EVERY 12 HOURS SCHEDULED
Status: DISCONTINUED | OUTPATIENT
Start: 2021-01-19 | End: 2021-01-19

## 2021-01-19 RX ORDER — PROPOFOL 10 MG/ML
VIAL (ML) INTRAVENOUS AS NEEDED
Status: DISCONTINUED | OUTPATIENT
Start: 2021-01-19 | End: 2021-01-19 | Stop reason: SURG

## 2021-01-19 RX ORDER — MELOXICAM 15 MG/1
15 TABLET ORAL DAILY
Status: DISCONTINUED | OUTPATIENT
Start: 2021-01-20 | End: 2021-01-20 | Stop reason: HOSPADM

## 2021-01-19 RX ORDER — ONDANSETRON 2 MG/ML
4 INJECTION INTRAMUSCULAR; INTRAVENOUS EVERY 6 HOURS PRN
Status: DISCONTINUED | OUTPATIENT
Start: 2021-01-19 | End: 2021-01-20 | Stop reason: HOSPADM

## 2021-01-19 RX ORDER — PENTOXIFYLLINE 400 MG/1
400 TABLET, EXTENDED RELEASE ORAL EVERY 12 HOURS
Status: DISCONTINUED | OUTPATIENT
Start: 2021-01-20 | End: 2021-01-20 | Stop reason: HOSPADM

## 2021-01-19 RX ORDER — NEOSTIGMINE METHYLSULFATE 5 MG/5 ML
SYRINGE (ML) INTRAVENOUS AS NEEDED
Status: DISCONTINUED | OUTPATIENT
Start: 2021-01-19 | End: 2021-01-19 | Stop reason: SURG

## 2021-01-19 RX ORDER — SODIUM CHLORIDE, SODIUM LACTATE, POTASSIUM CHLORIDE, CALCIUM CHLORIDE 600; 310; 30; 20 MG/100ML; MG/100ML; MG/100ML; MG/100ML
1000 INJECTION, SOLUTION INTRAVENOUS CONTINUOUS
Status: DISCONTINUED | OUTPATIENT
Start: 2021-01-19 | End: 2021-01-20 | Stop reason: HOSPADM

## 2021-01-19 RX ORDER — SODIUM CHLORIDE 9 MG/ML
75 INJECTION, SOLUTION INTRAVENOUS CONTINUOUS
Status: DISCONTINUED | OUTPATIENT
Start: 2021-01-19 | End: 2021-01-20 | Stop reason: HOSPADM

## 2021-01-19 RX ORDER — ONDANSETRON 4 MG/1
4 TABLET, FILM COATED ORAL EVERY 6 HOURS PRN
Status: DISCONTINUED | OUTPATIENT
Start: 2021-01-19 | End: 2021-01-20 | Stop reason: HOSPADM

## 2021-01-19 RX ORDER — ROCURONIUM BROMIDE 10 MG/ML
INJECTION, SOLUTION INTRAVENOUS AS NEEDED
Status: DISCONTINUED | OUTPATIENT
Start: 2021-01-19 | End: 2021-01-19 | Stop reason: SURG

## 2021-01-19 RX ORDER — DIPHENHYDRAMINE HYDROCHLORIDE 50 MG/ML
25 INJECTION INTRAMUSCULAR; INTRAVENOUS NIGHTLY PRN
Status: DISCONTINUED | OUTPATIENT
Start: 2021-01-19 | End: 2021-01-20 | Stop reason: HOSPADM

## 2021-01-19 RX ORDER — ACETAMINOPHEN 325 MG/1
650 TABLET ORAL EVERY 4 HOURS PRN
Status: DISCONTINUED | OUTPATIENT
Start: 2021-01-19 | End: 2021-01-20 | Stop reason: HOSPADM

## 2021-01-19 RX ORDER — MORPHINE SULFATE 4 MG/ML
2 INJECTION, SOLUTION INTRAMUSCULAR; INTRAVENOUS EVERY 4 HOURS PRN
Status: DISCONTINUED | OUTPATIENT
Start: 2021-01-19 | End: 2021-01-20 | Stop reason: HOSPADM

## 2021-01-19 RX ORDER — FENTANYL CITRATE 50 UG/ML
25 INJECTION, SOLUTION INTRAMUSCULAR; INTRAVENOUS
Status: DISCONTINUED | OUTPATIENT
Start: 2021-01-19 | End: 2021-01-19 | Stop reason: HOSPADM

## 2021-01-19 RX ORDER — OXYCODONE HYDROCHLORIDE 5 MG/1
10 TABLET ORAL EVERY 4 HOURS PRN
Status: DISCONTINUED | OUTPATIENT
Start: 2021-01-19 | End: 2021-01-20 | Stop reason: HOSPADM

## 2021-01-19 RX ORDER — NALOXONE HCL 0.4 MG/ML
0.4 VIAL (ML) INJECTION
Status: DISCONTINUED | OUTPATIENT
Start: 2021-01-19 | End: 2021-01-20 | Stop reason: HOSPADM

## 2021-01-19 RX ORDER — BUPIVACAINE HYDROCHLORIDE 5 MG/ML
INJECTION, SOLUTION EPIDURAL; INTRACAUDAL
Status: COMPLETED | OUTPATIENT
Start: 2021-01-19 | End: 2021-01-19

## 2021-01-19 RX ORDER — OXYCODONE HCL 20 MG/1
20 TABLET, FILM COATED, EXTENDED RELEASE ORAL EVERY 12 HOURS SCHEDULED
Status: DISCONTINUED | OUTPATIENT
Start: 2021-01-19 | End: 2021-01-20 | Stop reason: HOSPADM

## 2021-01-19 RX ORDER — DEXAMETHASONE SODIUM PHOSPHATE 4 MG/ML
INJECTION, SOLUTION INTRA-ARTICULAR; INTRALESIONAL; INTRAMUSCULAR; INTRAVENOUS; SOFT TISSUE AS NEEDED
Status: DISCONTINUED | OUTPATIENT
Start: 2021-01-19 | End: 2021-01-19 | Stop reason: SURG

## 2021-01-19 RX ORDER — MELOXICAM 15 MG/1
15 TABLET ORAL ONCE
Status: COMPLETED | OUTPATIENT
Start: 2021-01-19 | End: 2021-01-19

## 2021-01-19 RX ORDER — ONDANSETRON 2 MG/ML
INJECTION INTRAMUSCULAR; INTRAVENOUS AS NEEDED
Status: DISCONTINUED | OUTPATIENT
Start: 2021-01-19 | End: 2021-01-19 | Stop reason: SURG

## 2021-01-19 RX ORDER — ACETAMINOPHEN 500 MG
TABLET ORAL AS NEEDED
Status: DISCONTINUED | OUTPATIENT
Start: 2021-01-19 | End: 2021-01-19 | Stop reason: SURG

## 2021-01-19 RX ADMIN — ROCURONIUM BROMIDE 10 MG: 10 INJECTION, SOLUTION INTRAVENOUS at 08:32

## 2021-01-19 RX ADMIN — LIDOCAINE HYDROCHLORIDE 30 MG: 10 INJECTION, SOLUTION EPIDURAL; INFILTRATION; INTRACAUDAL; PERINEURAL at 09:04

## 2021-01-19 RX ADMIN — DEXAMETHASONE SODIUM PHOSPHATE 8 MG: 4 INJECTION, SOLUTION INTRAMUSCULAR; INTRAVENOUS at 08:06

## 2021-01-19 RX ADMIN — CEFAZOLIN SODIUM 2 G: 10 INJECTION, POWDER, FOR SOLUTION INTRAVENOUS at 15:50

## 2021-01-19 RX ADMIN — PREGABALIN 150 MG: 75 CAPSULE ORAL at 06:35

## 2021-01-19 RX ADMIN — OXYCODONE HYDROCHLORIDE 10 MG: 10 TABLET, FILM COATED, EXTENDED RELEASE ORAL at 06:36

## 2021-01-19 RX ADMIN — CEFAZOLIN SODIUM 2 G: 1 INJECTION, POWDER, FOR SOLUTION INTRAMUSCULAR; INTRAVENOUS at 07:27

## 2021-01-19 RX ADMIN — PROPOFOL 150 MG: 10 INJECTION, EMULSION INTRAVENOUS at 07:28

## 2021-01-19 RX ADMIN — SODIUM CHLORIDE, POTASSIUM CHLORIDE, SODIUM LACTATE AND CALCIUM CHLORIDE 1000 ML: 600; 310; 30; 20 INJECTION, SOLUTION INTRAVENOUS at 06:35

## 2021-01-19 RX ADMIN — Medication 5 MG: at 07:58

## 2021-01-19 RX ADMIN — LIDOCAINE HYDROCHLORIDE 50 MG: 10 INJECTION, SOLUTION EPIDURAL; INFILTRATION; INTRACAUDAL; PERINEURAL at 07:28

## 2021-01-19 RX ADMIN — BUPIVACAINE 10 ML: 13.3 INJECTION, SUSPENSION, LIPOSOMAL INFILTRATION at 07:08

## 2021-01-19 RX ADMIN — SODIUM CHLORIDE, SODIUM LACTATE, POTASSIUM CHLORIDE, AND CALCIUM CHLORIDE: .6; .31; .03; .02 INJECTION, SOLUTION INTRAVENOUS at 08:42

## 2021-01-19 RX ADMIN — KETAMINE HYDROCHLORIDE 25 MG: 10 INJECTION INTRAMUSCULAR; INTRAVENOUS at 08:47

## 2021-01-19 RX ADMIN — Medication 0.8 MG: at 08:57

## 2021-01-19 RX ADMIN — Medication 5 MG: at 08:58

## 2021-01-19 RX ADMIN — ONDANSETRON 4 MG: 2 INJECTION INTRAMUSCULAR; INTRAVENOUS at 08:53

## 2021-01-19 RX ADMIN — Medication 5 MG: at 08:39

## 2021-01-19 RX ADMIN — CEFAZOLIN SODIUM 2 G: 10 INJECTION, POWDER, FOR SOLUTION INTRAVENOUS at 23:11

## 2021-01-19 RX ADMIN — ACETAMINOPHEN 650 MG: 325 TABLET, FILM COATED ORAL at 18:18

## 2021-01-19 RX ADMIN — OXYCODONE HYDROCHLORIDE 20 MG: 20 TABLET, FILM COATED, EXTENDED RELEASE ORAL at 20:40

## 2021-01-19 RX ADMIN — BENZOYL PEROXIDE: 10 SUSPENSION TOPICAL at 06:38

## 2021-01-19 RX ADMIN — ROCURONIUM BROMIDE 50 MG: 10 INJECTION, SOLUTION INTRAVENOUS at 07:28

## 2021-01-19 RX ADMIN — FENTANYL CITRATE 50 MCG: 50 INJECTION, SOLUTION INTRAMUSCULAR; INTRAVENOUS at 07:28

## 2021-01-19 RX ADMIN — SODIUM CHLORIDE, SODIUM LACTATE, POTASSIUM CHLORIDE, AND CALCIUM CHLORIDE: .6; .31; .03; .02 INJECTION, SOLUTION INTRAVENOUS at 07:17

## 2021-01-19 RX ADMIN — Medication 5 MG: at 07:52

## 2021-01-19 RX ADMIN — MELOXICAM 15 MG: 15 TABLET ORAL at 06:35

## 2021-01-19 RX ADMIN — ACETAMINOPHEN 1000 MG: 500 TABLET, FILM COATED ORAL at 07:04

## 2021-01-19 RX ADMIN — Medication 0.2 MG: at 07:45

## 2021-01-19 RX ADMIN — TRANEXAMIC ACID 1000 MG: 1 INJECTION, SOLUTION INTRAVENOUS at 07:35

## 2021-01-19 RX ADMIN — CETIRIZINE HYDROCHLORIDE 10 MG: 10 TABLET, FILM COATED ORAL at 12:32

## 2021-01-19 RX ADMIN — ASPIRIN 325 MG: 325 TABLET, COATED ORAL at 20:40

## 2021-01-19 RX ADMIN — SODIUM CHLORIDE 75 ML/HR: 0.9 INJECTION, SOLUTION INTRAVENOUS at 12:33

## 2021-01-19 RX ADMIN — ROCURONIUM BROMIDE 10 MG: 10 INJECTION, SOLUTION INTRAVENOUS at 08:09

## 2021-01-19 RX ADMIN — HYDROMORPHONE HYDROCHLORIDE 0.5 MG: 2 INJECTION, SOLUTION INTRAMUSCULAR; INTRAVENOUS; SUBCUTANEOUS at 10:06

## 2021-01-19 RX ADMIN — BUPIVACAINE HYDROCHLORIDE 10 ML: 5 INJECTION, SOLUTION EPIDURAL; INTRACAUDAL; PERINEURAL at 07:08

## 2021-01-19 RX ADMIN — LEVOTHYROXINE, LIOTHYRONINE 60 MG: 19; 4.5 TABLET ORAL at 12:32

## 2021-01-19 RX ADMIN — PROPOFOL 90 MCG/KG/MIN: 10 INJECTION, EMULSION INTRAVENOUS at 07:38

## 2021-01-19 NOTE — THERAPY EVALUATION
Patient Name: Robin Wheatley  : 1960    MRN: [id number]                              Today's Date: 2021       Admit Date: 2021    Visit Dx:     ICD-10-CM ICD-9-CM   1. Osteoarthritis of left glenohumeral joint  M19.012 715.91     Patient Active Problem List   Diagnosis   • Allergic rhinitis   • Arthritis   • Benign essential hypertension   • Deafness   • Kidney stone   • Obesity (BMI 30-39.9)   • Sleep apnea   • Testicular hypofunction   • Vertigo   • Acquired hypothyroidism   • DJD of shoulder   • Osteoarthritis of left glenohumeral joint   • Thrombocytopenia, chronic   • Former smoker     Past Medical History:   Diagnosis Date   • Allergic    • DJD of shoulder 2021   • Hx of difficult intubation    • Hypertension    • Obesity (BMI 30-39.9)    • Osteoarthritis of left glenohumeral joint 2021    Added automatically from request for surgery [id number]   • Sleep apnea     has CPAP   • Testicular hypofunction    • Thrombocytopenia, chronic    • Vertigo      Past Surgical History:   Procedure Laterality Date   • COLON SURGERY     • QUADRICEPS TENDON REPAIR     • SEPTOPLASTY     • SHOULDER SURGERY Bilateral    • SINUS SURGERY       General Information     Row Name 21 1228          General Information    Prior Level of Function  independent:;ADL's;work;driving return to heavy lifting is a priority for him. 100 lbs!  -     Existing Precautions/Restrictions  shoulder  -     Row Name 21 1228          Living Environment    Lives With  spouse  -     Row Name 21 1228          Home Main Entrance    Number of Stairs, Main Entrance  none  -     Row Name 21 1228          Cognition    Orientation Status (Cognition)  oriented x 4  -     Row Name 21 1228          Safety Issues, Functional Mobility    Safety Issues Affecting Function (Mobility)  awareness of need for assistance;at risk behavior observed;impulsivity;judgment;insight into  deficits/self-awareness;problem-solving;safety precautions follow-through/compliance  -     Impairments Affecting Function (Mobility)  coordination;range of motion (ROM);strength  -       User Key  (r) = Recorded By, (t) = Taken By, (c) = Cosigned By    Initials Name Provider Type     Kathy Pineda OT Occupational Therapist          Mobility/ADL's     Row Name 01/19/21 [facility]          Bed Mobility    Bed Mobility  bed mobility (all) activities  -     All Activities, [place] (Bed Mobility)  supervision;verbal cues  -     Row Name 01/19/21 1231          Transfers    Transfers  sit-stand transfer;bed-chair transfer  -     Bed-Chair [place] (Transfers)  modified independence;supervision  -     Sit-Stand [place] (Transfers)  modified independence;supervision  -     Row Name 01/19/21 [facility]          Functional Mobility    Functional Mobility- Ind. Level  supervision required  -     Functional Mobility- Comment  almost stepped on IV line  -     Row Name 01/19/21 [facility]          Activities of Daily Living    BADL Assessment/Intervention  lower body dressing;toileting;upper body dressing  -     Row Name 01/19/21 [facility]          Lower Body Dressing Assessment/Training    [place] Level (Lower Body Dressing)  don;pants/bottoms;socks;maximum assist (25% patient effort)  -     Row Name 01/19/21 123          Toileting Assessment/Training    [place] Level (Toileting)  adjust/manage clothing;perform perineal hygiene;set up;contact guard assist  -     Position (Toileting)  unsupported standing  -     Row Name 01/19/21 [facility]          Upper Body Dressing Assessment/Training    [place] Level (Upper Body Dressing)  doff;don;front opening garment;maximum assist (25% patient effort) sling & ice pad  -     Comment (Upper Body Dressing)  spouse assisting  -       User Key  (r) = Recorded By, (t) = Taken By, (c) = Cosigned By    Initials Name Provider Type     Kathy Pineda, OT  Occupational Therapist        Obj/Interventions     Row Name 01/19/21 1243          Sensory Assessment (Somatosensory)    Sensory Assessment (Somatosensory)  sensation intact  -[facility] Name 01/19/21 1243          Sensory Interventions    Comment, Sensory Intervention  minor numbness still present in thenar eminence from excellent nerve block. Deep dissue pain & sensation is limited due to nerve block.  -     Row Name 01/19/21 1243          Vision Assessment/Intervention    Visual Impairment/Limitations  WFL  -[facility] Name 01/19/21 1243          Range of Motion Comprehensive    Comment, General Range of Motion  non-dominant lt shld limited to 90* flexoin due to surgery, swelling/stiffness. Pt asked not to actively lift the arm yet. Pt eager to passively lift it & is too agressive w/ his pendulums & flopping during transfers is also conserning. Education provided. Spouse is helpful with this aspect of pt recovery.  -[facility] Name 01/19/21 1243          Strength Comprehensive (MMT)    Comment, General Manual Muscle Testing (MMT) Assessment  Lt  & elbow 3+/5  -[facility] Name 01/19/21 1243          Balance    Balance Assessment  sitting static balance;sitting dynamic balance;standing static balance;standing dynamic balance  -     Static Sitting Balance  WFL  -     Dynamic Sitting Balance  WFL  -     Static Standing Balance  mild impairment;unsupported  -     Dynamic Standing Balance  mild impairment;unsupported  -       User Key  (r) = Recorded By, (t) = Taken By, (c) = Cosigned By    Initials Name Provider Type     Kathy Pineda OT Occupational Therapist        Goals/Plan     [facility] Name 01/19/21 1254          Transfer Goal 1 (OT)    Activity/Assistive Device (Transfer Goal 1, OT)  transfers, all  -     [place] Level/Cues Needed (Transfer Goal 1, OT)  independent  -     Time Frame (Transfer Goal 1, OT)  1 week  -[facility] Name 01/19/21 1254          Dressing Goal 1 (OT)     Activity/Device (Dressing Goal 1, OT)  dressing skills, all  -     [place]/Cues Needed (Dressing Goal 1, OT)  minimum assist (75% or more patient effort)  -     Time Frame (Dressing Goal 1, OT)  1 week  -[facility]     Row Name 01/19/21 1254          Toileting Goal 1 (OT)    Activity/Device (Toileting Goal 1, OT)  toileting skills, all  -     [place] Level/Cues Needed (Toileting Goal 1, OT)  modified independence  -     Time Frame (Toileting Goal 1, OT)  2 weeks  -[facility]     Row Name 01/19/21 1254          ROM Goal 1 (OT)    ROM Goal 1 (OT)  passive Lt shld flex to 75% of full  -[facility]     Time Frame (ROM Goal 1, OT)  long term goal (LTG);3 weeks  -[facility]     Row Name 01/19/21 1253          Strength Goal 1 (OT)    Strength Goal 1 (OT)  Lt shld flex 2+/5  -[facility]     Time Frame (Strength Goal 1, OT)  3 weeks  -[facility]     Row Name 01/19/21 1250          Therapy Assessment/Plan (OT)    Planned Therapy Interventions (OT)  activity tolerance training;BADL retraining;adaptive equipment training;patient/caregiver education/training;passive ROM/stretching;ROM/therapeutic exercise;transfer/mobility retraining  -       User Key  (r) = Recorded By, (t) = Taken By, (c) = Cosigned By    Initials Name Provider Type     Kathy Pineda, OT Occupational Therapist        Clinical Impression     [facility] Name 01/19/21 2401          Pain Assessment    Additional Documentation  Pain Scale: Numbers Pre/Post-Treatment (Group)  -[facility]     Row Name 01/19/21 8010          Pain Scale: Numbers Pre/Post-Treatment    Pretreatment Pain Rating  2/10  -     Posttreatment Pain Rating  0/10 - no pain  -[facility]     Row Name 01/19/21 8231          Plan of Care Review    Plan of Care Reviewed With  patient;spouse  -     Outcome Summary  Pt is 61 y/o M Ft employed & wanting to return to heavy lifting up to 100 lbs as soon as possible POD 0 reverse Lt TSA. Pt is rt-hand dominant & lives w/ helpful spouse. He has had 2 Sx to Rt rotator cuff & one prior to Lt which was  4-5 years ago. Pt does as expected w/ initial OT visit, but he is impulsive, nearly stepping on IV line, flopping around during transfers, asking to keep arm out of sling, and agressively swinging his arm during pendulums. Pt will need OP PT and assist at home from spouse. He will need to be help back from his agressive use of the limb to keep it's healing trajectory maximized. Pt was educated that it may not be advisable to plan to lift 100 lbs any longer at work & to start thinking about ways to adapt environment & body mechanics to limit the stresses he places on his shoulders. OP OT might address these issues with some success if pt desires. PPE worn, mask, gloves, safety glasses.  -     Row Name 01/19/21 1247          Therapy Assessment/Plan (OT)    Rehab Potential (OT)  good, to achieve stated therapy goals  -     Criteria for Skilled Therapeutic Interventions Met (OT)  skilled treatment is necessary  -     Therapy Frequency (OT)  daily  -     Predicted Duration of Therapy Intervention (OT)  until D/C  -     Row Name 01/19/21 1247          Therapy Plan Review/Discharge Plan (OT)    Anticipated Discharge Disposition (OT)  home with assist;home with outpatient therapy services  -     Row Name 01/19/21 1247          Vital Signs    O2 Delivery Pre Treatment  room air  -     Pre Patient Position  Supine  -     Intra Patient Position  Standing  -     Post Patient Position  Sitting  -     Row Name 01/19/21 1247          Positioning and Restraints    Pre-Treatment Position  in bed  -     Post Treatment Position  chair  -     In Chair  notified nsg;sitting;call light within reach;with family/caregiver;encouraged to call for assist  -       User Key  (r) = Recorded By, (t) = Taken By, (c) = Cosigned By    Initials Name Provider Type    Kathy Moncada OT Occupational Therapist        Outcome Measures    No documentation.       Occupational Therapy Education                 Title: PT OT SLP  Therapies (Done)     Topic: Occupational Therapy (Done)     Point: ADL training (Done)     Description:   Instruct learner(s) on proper safety adaptation and remediation techniques during self care or transfers.   Instruct in proper use of assistive devices.              Learning Progress Summary           Patient Acceptance, E,TB,D,H, VU,NR by  at 1/19/2021 1318   Family Acceptance, E,TB,D,H, VU,NR by  at 1/19/2021 1318                   Point: Home exercise program (Done)     Description:   Instruct learner(s) on appropriate technique for monitoring, assisting and/or progressing therapeutic exercises/activities.              Learning Progress Summary           Patient Acceptance, E,TB,D,H, VU,NR by  at 1/19/2021 1318   Family Acceptance, E,TB,D,H, VU,NR by  at 1/19/2021 1318                   Point: Precautions (Done)     Description:   Instruct learner(s) on prescribed precautions during self-care and functional transfers.              Learning Progress Summary           Patient Acceptance, E,TB,D,H, VU,NR by  at 1/19/2021 1318   Family Acceptance, E,TB,D,H, VU,NR by  at 1/19/2021 1318                   Point: Body mechanics (Done)     Description:   Instruct learner(s) on proper positioning and spine alignment during self-care, functional mobility activities and/or exercises.              Learning Progress Summary           Patient Acceptance, E,TB,D,H, VU,NR by  at 1/19/2021 1318   Family Acceptance, E,TB,D,H, VU,NR by  at 1/19/2021 1318                               User Key     Initials Effective Dates Name Provider Type Discipline     03/01/19 -  Kathy Pineda OT Occupational Therapist OT              OT Recommendation and Plan  Planned Therapy Interventions (OT): activity tolerance training, BADL retraining, adaptive equipment training, patient/caregiver education/training, passive ROM/stretching, ROM/therapeutic exercise, transfer/mobility retraining  Therapy Frequency (OT): daily  Plan  of Care Review  Plan of Care Reviewed With: patient, spouse  Outcome Summary: Pt is 61 y/o M Ft employed & wanting to return to heavy lifting up to 100 lbs as soon as possible POD 0 reverse Lt TSA. Pt is rt-hand dominant & lives w/ helpful spouse. He has had 2 Sx to Rt rotator cuff & one prior to Lt which was 4-5 years ago. Pt does as expected w/ initial OT visit, but he is impulsive, nearly stepping on IV line, flopping around during transfers, asking to keep arm out of sling, and agressively swinging his arm during pendulums. Pt will need OP PT and assist at home from spouse. He will need to be help back from his agressive use of the limb to keep it's healing trajectory maximized. Pt was educated that it may not be advisable to plan to lift 100 lbs any longer at work & to start thinking about ways to adapt environment & body mechanics to limit the stresses he places on his shoulders. OP OT might address these issues with some success if pt desires. PPE worn, mask, gloves, safety glasses.     Time Calculation:   Time Calculation- OT     Row Name 01/19/21 1319             Time Calculation-     OT Start Time  1135  -      OT Stop Time  1204  -      OT Time Calculation (min)  29 min  -      Total Timed Code Minutes- OT  23 minute(s)  -      OT Received On  01/19/21  -      OT - Next Appointment  01/20/21  -      OT Goal Re-Cert Due Date  02/02/21  -        User Key  (r) = Recorded By, (t) = Taken By, (c) = Cosigned By    Initials Name Provider Type     Kathy Pineda OT Occupational Therapist        Therapy Charges for Today     Code Description Service Date Service Provider Modifiers Qty    [id number]  OT SELF CARE/MGMT/TRAIN EA 15 MIN 1/19/2021 Kathy Pineda OT GO 1    [id number] HC OT THER PROC EA 15 MIN 1/19/2021 Kathy Pineda OT GO 1    [id number]  OT EVAL MOD COMPLEXITY 2 1/19/2021 Kathy Pineda OT GO 1               Kathy Pineda OT  1/19/2021

## 2021-01-19 NOTE — ANESTHESIA POSTPROCEDURE EVALUATION
Patient: Robin Wheatley    Procedure Summary     Date: 01/19/21 Room / Location: [facility] OR  / [facility] MAIN OR    Anesthesia Start: 0717 Anesthesia Stop: 0932    Procedure: TOTAL SHOULDER REVERSE ARTHROPLASTY (Left Shoulder) Diagnosis:       Osteoarthritis of left glenohumeral joint      (Osteoarthritis of left glenohumeral joint [M19.012])    Surgeon: Carl Hairston MD Provider: Brett Zepeda MD    Anesthesia Type: general with block ASA Status: 3          Anesthesia Type: general with block    Vitals  Vitals Value Taken Time   /90 01/19/21 1036   Temp 97.3 °F (36.3 °C) 01/19/21 1036   Pulse 47 01/19/21 1037   Resp 12 01/19/21 1036   SpO2 95 % 01/19/21 1037   Vitals shown include unvalidated device data.        Post Anesthesia Care and Evaluation    Patient location during evaluation: PACU  Patient participation: complete - patient participated  Level of consciousness: awake  Pain scale: See nurse's notes for pain score.  Pain management: adequate  Airway patency: patent  Anesthetic complications: No anesthetic complications  PONV Status: none  Cardiovascular status: acceptable  Respiratory status: acceptable  Hydration status: acceptable    Comments: Patient seen and examined postoperatively; vital signs stable; SpO2 greater than or equal to 90%; cardiopulmonary status stable; nausea/vomiting adequately controlled; pain adequately controlled; no apparent anesthesia complications; patient discharged from anesthesia care when discharge criteria were met

## 2021-01-19 NOTE — CONSULTS
[facility] Medicine Services      Patient Name: Robin Wheatley  : 1960  MRN: [id number]  Primary Care Physician: Marlo Michaels Jr., MD  Date of admission: 2021    Patient Care Team:  Marlo Michaels Jr., MD as PCP - General (Family Medicine)          Subjective   History Present Illness     Chief Complaint:  Left shoulder pain      Mr. Wheatley is a 60 y.o. male with a history of osteoarthritis of left glenohumeral joint who presented to [facility] on 2021 and underwent a left total shoulder reverse arthroplasty by Dr. Hairston. The patient was admitted to the Surgical Inpatient Unit postoperatively and the Hospitalist team was consulted for medical management.      In addition to osteoarthritis, the patient has a history of hypertension, sleep apnea, and hypothyroidism.          Review of Systems   Musculoskeletal: Positive for arthritis.   All other systems reviewed and are negative.        Personal History     Past Medical History:   Past Medical History:   Diagnosis Date   • Allergic    • Hx of difficult intubation    • Hypertension    • Sleep apnea     has CPAP   • Testicular hypofunction    • Vertigo        Surgical History:      Past Surgical History:   Procedure Laterality Date   • COLON SURGERY     • QUADRICEPS TENDON REPAIR     • SEPTOPLASTY     • SHOULDER SURGERY Bilateral    • SINUS SURGERY       Family History: family history includes Cancer in his mother. Otherwise pertinent FHx was reviewed and unremarkable.     Social History:  reports that he quit smoking about 6 years ago. He smoked 0.50 packs per day. He has never used smokeless tobacco. He reports that he does not drink alcohol or use drugs.      Medications:  Prior to Admission medications    Medication Sig Start Date End Date Taking? Authorizing Provider   JOAN THYROID 60 MG tablet Take 1 tablet by mouth Daily. Take DOS 19  Yes Provider, MD Savana   cetirizine (zyrTEC) 10 MG  tablet Take 10 mg by mouth Daily.   Yes Savana Serrano MD   DHEA 25 MG capsule Take 1 capsule by mouth Daily. Hold DOS   Yes Savana Serrano MD   Docosahexaenoic Acid (DHA OMEGA 3 PO) Take 1 tablet by mouth Daily. LD 1/12   Yes Savana Serrano MD   fluticasone (FLONASE) 50 MCG/ACT nasal spray 2 sprays into the nostril(s) as directed by provider Daily.   Yes Savana Serrano MD   Glucosamine-Chondroit-Vit C-Mn (Glucosamine 1500 Complex) capsule Take 2 capsules by mouth 2 (two) times a day.   Yes Savana Serrano MD   Hyaluronic Acid-Vitamin C (HYALURONIC ACID PO) Take  by mouth Daily. LD 1/12   Yes Savana Serrano MD   Magnesium-Potassium 250-100 MG tablet Take 1 tablet by mouth Daily.   Yes Savana Serrano MD   meloxicam (MOBIC) 15 MG tablet Take 1 tablet by mouth Daily.  Patient taking differently: Take 15 mg by mouth Daily. LD 1/12 11/24/20  Yes Marlo Michaels Jr., MD   multivitamin with minerals tablet tablet Take 1 tablet by mouth Daily. LD 1/12   Yes Savana Serrano MD   olmesartan (BENICAR) 20 MG tablet Take 10 mg by mouth Every Morning. LD 1/17 7/24/19  Yes Savana Serrano MD   pentoxifylline (TRENtal) 400 MG CR tablet TAKE 1 TABLET EVERY 12 HOURS  Patient taking differently: Take 400 mg by mouth Daily. Hold DOS 4/7/20  Yes Marlo Michaels Jr., MD   Testosterone Cypionate (DEPOTESTOTERONE CYPIONATE) 200 MG/ML injection Inject 200 mg into the appropriate muscle as directed by prescriber. Wed and sat 6/18/19  Yes Savana Serrano MD   traMADol HCl  MG capsule sustained-release 24 hr Take 1 capsule by mouth As Needed. 1/30/17  Yes Savana Serrano MD   vitamin D3 125 MCG (5000 UT) capsule capsule Take 5,000 Units by mouth Daily. Hold DOS   Yes Savana Serrano MD   Vitamins-Lipotropics ([facility] Plus) tablet Take 2 tablets by mouth Daily.   Yes Savana Serrano MD   anastrozole (ARIMIDEX) 1 MG tablet Pt states he has never  "taken this medication 11/30/16   Savana Serrano MD   diclofenac (VOLTAREN) 1 % gel gel APPLY 2 GRAMS TO AFFECTED G-TUBE AREA TWICE DAILY 11/29/16   Savana Serrano MD   meclizine (ANTIVERT) 25 MG tablet Take 25 mg by mouth 3 (Three) Times a Day As Needed for Dizziness (vertigo). 1/30/17   Savana Serrano MD   montelukast (SINGULAIR) 10 MG tablet TAKE 1 TABLET EVERY DAY  Patient taking differently: 10 mg. 6/14/20   Marlo Michaels Jr., MD   sildenafil (VIAGRA) 100 MG tablet SILDENAFIL CITRATE 100 MG TABS 4/10/19   Savana Serrano MD   Syringe/Needle, Disp, (BD ECLIPSE SYRINGE) 21G X 1\" 3 ML misc BD ECLIPSE SYRINGE 21G X 1\" 3 ML 4/17/15   Savana Serrano MD       Allergies:    Allergies   Allergen Reactions   • Levofloxacin GI Intolerance       Objective   Objective     Vital Signs  Temp:  [94.4 °F (34.7 °C)-97.6 °F (36.4 °C)] 94.4 °F (34.7 °C)  Heart Rate:  [48-61] 55  Resp:  [8-18] 18  BP: (132-156)/(77-93) 156/93  FiO2 (%):  [80 %] 80 %  SpO2:  [95 %-100 %] 97 %  on  Flow (L/min):  [6] 6;   Device (Oxygen Therapy): room air  Body mass index is 31.14 kg/m².    Physical Exam  Vitals signs reviewed.   Constitutional:       Appearance: He is obese.   HENT:      Head: Normocephalic.      Nose: Nose normal.      Mouth/Throat:      Mouth: Mucous membranes are moist.   Eyes:      Extraocular Movements: Extraocular movements intact.      Conjunctiva/sclera: Conjunctivae normal.      Pupils: Pupils are equal, round, and reactive to light.   Neck:      Musculoskeletal: Normal range of motion and neck supple.   Cardiovascular:      Rate and Rhythm: Normal rate and regular rhythm.      Pulses: Normal pulses.   Pulmonary:      Effort: Pulmonary effort is normal.      Breath sounds: Normal breath sounds.   Abdominal:      General: Bowel sounds are normal. There is no distension.      Palpations: Abdomen is soft.      Tenderness: There is no abdominal tenderness.   Musculoskeletal:      Right lower " leg: No edema.      Left lower leg: No edema.      Comments: LUE in sling   Skin:     General: Skin is warm and dry.      Capillary Refill: Capillary refill takes less than 2 seconds.      Comments: Left shoulder surgical incision covered with dry dressing   Neurological:      General: No focal deficit present.      Mental Status: He is alert and oriented to person, place, and time.   Psychiatric:         Mood and Affect: Mood normal.         Behavior: Behavior normal.         Results Review:  I have personally reviewed most recent lab results, microbiology results and radiology images and interpretations and agree with findings.        Estimated Creatinine Clearance: 152.5 mL/min (A) (by C-G formula based on SCr of 0.69 mg/dL (L)).  Brief Urine Lab Results     None          Microbiology Results (last 10 days)     Procedure Component Value - Date/Time    COVID PRE-OP / PRE-PROCEDURE SCREENING ORDER (NO ISOLATION) - Swab, Nasopharynx [[id number]]  (Normal) Collected: 01/16/21 1133    Lab Status: Final result Specimen: Swab from Nasopharynx Updated: 01/16/21 2210    Narrative:      The following orders were created for panel order COVID PRE-OP / PRE-PROCEDURE SCREENING ORDER (NO ISOLATION) - Swab, Nasopharynx.  Procedure                               Abnormality         Status                     ---------                               -----------         ------                     COVID-19,APTIMA PANTHER,...[[id number]]  Normal              Final result                 Please view results for these tests on the individual orders.    COVID-19,APTIMA PANTHER,[facility] IN-HOUSE, NP/OP SWAB IN UTM/VTM/SALINE TRANSPORT MEDIA,24 HR TAT - Swab, Nasopharynx [[id number]]  (Normal) Collected: 01/16/21 1133    Lab Status: Final result Specimen: Swab from Nasopharynx Updated: 01/16/21 2210     COVID19 Not Detected    Narrative:      Fact sheet for providers: https://www.fda.gov/media/620661/download     Fact sheet for patients:  https://www.fda.gov/media/104867/download    Test performed by PCR.          ECG/EMG Results (most recent)     None          Xr Chest 2 View    Result Date: 1/12/2021  No acute cardiopulmonary abnormality.  Electronically Signed By-Aroldo Crespo MD On:1/12/2021 12:44 PM This report was finalized on [id number] by  Aroldo Crespo MD.    Xr Shoulder 2+ View Left    Result Date: 1/19/2021  Linear lucency over the greater tuberosity of the humerus on one view. This may simply be artifactual, but subtle nondisplaced hairline fracture could have a similar imaging appearance. Left shoulder replacement, which appears appropriately positioned.  Electronically Signed By-Jennifer Murray MD On:1/19/2021 11:11 AM This report was finalized on [id number] by  Jennifer Murray MD.      Estimated Creatinine Clearance: 152.5 mL/min (A) (by C-G formula based on SCr of 0.69 mg/dL (L)).      Assessment/Plan   Assessment/Plan       Active Hospital Problems    Diagnosis  POA   • **Osteoarthritis of left glenohumeral joint [M19.012]  Yes   • Thrombocytopenia, chronic [D69.6]  Yes   • Former smoker [Z87.891]  Not Applicable   • DJD of shoulder [M19.019]  Yes   • Acquired hypothyroidism [E03.9]  Yes   • Sleep apnea [G47.30]  Yes   • Obesity (BMI 30-39.9) [E66.9]  Yes   • Benign essential hypertension [I10]  Yes      Resolved Hospital Problems   No resolved problems to display.       Osteoarthritis of left glenohumeral joint / DJD of shoulder  -s/p left total shoulder reverse arthroplasty (01/19/21)  -post-op care and PRN analgesia per orthopedic surgery  -LUE in sling  -PT/OT to eval and treat    Benign essential hypertension, chronic, controlled  -losartan substituted for olmesartan  -monitor BP    Obesity (BMI 30-39.9)  -BMI 31.14 on admission  -encourage lifestyle modifications     Sleep apnea  -nocturnal CPAP    Acquired hypothyroidism  -continue armour thyroid     Thrombocytopenia, chronic  -likely secondary to [place]    -stable    Former smoker                VTE Prophylaxis -   Mechanical Order History:      Ordered        01/19/21 0714  Place Sequential Compression Device  Once         01/19/21 0714  Maintain Sequential Compression Device  Continuous                 Pharmalogical Order History:     None          CODE STATUS:    Code Status and Medical Interventions:   Ordered at: 01/19/21 1106     Code Status:    CPR     Medical Interventions (Level of Support Prior to Arrest):    Full       This patient has been examined wearing appropriate Personal Protective Equipment. 01/19/21        Signature: Electronically signed by MIHAELA Grissom, 01/19/21, 11:53 AM EST.  [facility]ist Team

## 2021-01-19 NOTE — ANESTHESIA PROCEDURE NOTES
Peripheral Block    Pre-sedation assessment completed: 1/19/2021 7:00 AM    Patient reassessed immediately prior to procedure    Patient location during procedure: pre-op  Reason for block: procedure for pain, at surgeon's request, post-op pain management and secondary anesthetic  Performed by  Anesthesiologist: Brett Zepeda MD  Preanesthetic Checklist  Completed: patient identified, site marked, surgical consent, pre-op evaluation, timeout performed, IV checked, risks and benefits discussed and monitors and equipment checked  Prep:  Pt Position: sitting  Sterile barriers:cap, gloves, mask and washed/disinfected hands  Prep: ChloraPrep  Patient monitoring: blood pressure monitoring, continuous pulse oximetry and EKG  Procedure  Sedation:no  Performed under: local infiltration  Guidance:ultrasound guided and landmark technique  ULTRASOUND INTERPRETATION.  Using ultrasound guidance a 22 G gauge needle was placed in close proximity to the brachial plexus nerve, at which point, under ultrasound guidance anesthetic was injected in the area of the nerve and spread of the anesthesia was seen on ultrasound in close proximity thereto.  There were no abnormalities seen on ultrasound; a digital image was taken; and the patient tolerated the procedure with no complications. Images:still images obtained, printed/placed on chart    Laterality:left  Block Type:interscalene  Injection Technique:single-shot  Needle Type:echogenic  Needle Gauge:22 G  Resistance on Injection: less than 15 psi    Medications Used: bupivacaine liposome (EXPAREL) injection 1.3%, 10 mL  bupivacaine PF (MARCAINE) injection 0.5%, 10 mL  Med admintered at 1/19/2021 7:08 AM      Post Assessment  Injection Assessment: negative aspiration for heme, no paresthesia on injection and incremental injection  Patient Tolerance:comfortable throughout block  Complications:no  Additional Notes  Pre-procedure:  Peripheral nerve block performed preoperatively  prior to the start of anesthesia time at the request of the patient and the surgeon for the management of postoperative acute surgical pain as well as for a secondary intraoperative anesthetic (general anesthesia is the primary intraoperative anesthetic); patient identified; pre-procedure vital signs, all relevant labs/studies, complete medical/surgical/anesthetic history, full medication list, full allergy list, and NPO status obtained/reviewed; physical assessment performed; anesthetic options, side effects, potential complications, risks, and benefits discussed; questions answered; patient wishes to proceed with the procedure; written anesthesia procedure consent obtained; patient cleared for procedure; time out performed; IV access in situ    Procedure:  ASA monitor placed; supplemental oxygen provided; patient positioned; hand hygiene performed; sterile technique maintained throughout the procedure; sterile prep applied; insertion site determined by anatomical landmarks, palpation, and ultrasound imaging; live ultrasound guidance throughout the procedure; target nerves/landmarks identified on live ultrasound; skin and subcutaneous tissues numbed by injection of 1% lidocaine; 2 inch 22G StimupFlare Code Ultra 360 Insulated Echogenic Needle used; realtime needle advancement and placement near the target nerves witnessed on live ultrasound; negative aspiration prior to injection; correct needle placement confirmed on live ultrasound by local anesthetic spread around the target nerves; local anesthetic mixture injected with negative aspiration prior to each injection and after each 1-5 mL injected; needle withdrawn; dressing applied; ultrasound image printed and placed in the patient's permanent medical record    Post-procedure:  Peripheral nerve block placed successfully; good block; no apparent complications; minimal estimated blood loss; vital signs stable throughout; see nurse's notes for vitals; transported to the  OR, general anesthesia induced, and surgery started

## 2021-01-19 NOTE — PLAN OF CARE
Goal Outcome Evaluation:  Plan of Care Reviewed With: patient, spouse     Outcome Summary: Pt is 59 y/o M Ft employed & wanting to return to heavy lifting up to 100 lbs as soon as possible POD 0 reverse Lt TSA. Pt is rt-hand dominant & lives w/ helpful spouse. He has had 2 Sx to Rt rotator cuff & one prior to Lt which was 4-5 years ago. Pt does as expected w/ initial OT visit, but he is impulsive, nearly stepping on IV line, flopping around during transfers, asking to keep arm out of sling, and agressively swinging his arm during pendulums. Pt will need OP PT and assist at home from spouse. He will need to be help back from his agressive use of the limb to keep it's healing trajectory maximized. Pt was educated that it may not be advisable to plan to lift 100 lbs any longer at work & to start thinking about ways to adapt environment & body mechanics to limit the stresses he places on his shoulders. OP OT might address these issues with some success if pt desires. PPE worn, mask, gloves, safety glasses.

## 2021-01-19 NOTE — OP NOTE
TOTAL SHOULDER REVERSE ARTHROPLASTY  Procedure Report    Patient Name:  Robin Wheatley  YOB: 1960    Date of Surgery:  1/19/2021     Indications: This is a 60 y.o. male with left shoulder pain.  Imaging demonstrated degenerative joint disease with a massive cuff tear.  Treatment options were discussed.  They desired to proceed with reverse shoulder arthroplasty after discussing the risks including bleeding, scarring, infection, stiffness, nerve damage, tendon damage, artery damage, continued  pain, DVT, loss of life or limb, fracture, dislocation, and a need for further surgery.      Pre-op Diagnosis:   Osteoarthritis of left glenohumeral joint [M19.012]       Post-op Diagnosis:    Post-Op Diagnosis Codes:     * Osteoarthritis of left glenohumeral joint [M19.012]    Procedure/CPT® Codes: 86416    Procedure(s):  TOTAL SHOULDER REVERSE ARTHROPLASTY    Staff: Kwesi Crespo certified first assist    was responsible for performing the following activities: Retraction, Suction, Irrigation, Suturing, Closing and Placing Dressing and their skilled assistance was necessary for the success of this case.       Anesthesia: General with Block    Estimated Blood Loss: 100 mL    Implants:    Implant Name Type Inv. Item Serial No.  Lot No. LRB No. Used Action   GLENSPHR TRABECULARMETAL REV 40MM - [id number] Implant GLENSPHR TRABECULARMETAL REV 40MM  [employer] [id number] Left 1 Implanted   BASEPLT JOHNNIE TRABECULARMETAL REV 15MM - [id number] Implant BASEPLT JOHNNIE TRABECULARMETAL REV 15MM  [employer] [id number] Left 1 Implanted   SCRW CANC INV/REV 4.5X30MM - [id number] Implant SCRW CANC INV/REV 4.5X30MM  [employer] [id number] Left 1 Implanted   SCRW CANC INV/REV 4.5X36MM - [id number] Implant SCRW CANC INV/REV 4.5X36MM  [employer] [id number] Left 1 Implanted   STEM HUM COMPRNSV HAL MINI 13MM - [id number] Implant STEM HUM COMPRNSV HAL MINI 13MM  [employer] [id number] Left 1 Implanted   TRY HUM REV SHLDR  MINI COCR STD PLS 5MM - [id number] Implant TRY HUM REV SHLDR MINI COCR STD PLS 5MM  [employer] [id number] Left 1 Implanted   BEAR HUM PROLNG STD 40MM - [id number] Implant BEAR HUM PROLNG STD 40MM  [employer] [id number] Left 1 Implanted       IVF: see anesthesia      Complications: None    Specimens:none    Description of Procedure: The patient's operative site was marked in the  preoperative holding area.  They received regional anesthesia and were brought to  the operating room and placed supine on a well-padded operating room table.  General anesthesia was administered.  Antibiotics were dosed.  A timeout was  taken, confirming the correct operative site and procedure.  They were placed in  the beach chair position.  The left shoulder was prepped and draped in the  standard surgical fashion.  SCDs were placed. A deltopectoral incision was marked and injected with local anesthetic.  The skin was opened.  Flaps were raised.  The interval was opened and the cephalic vein was protected.  Adhesions were released from the deltoid.  The circumflex vessels were identified and ligated with suture and cautery.  The rotator interval was opened and a retractor was placed.  The subscapularis was absent and capsule was  Tenotomized and the capsule was released down to the 6 o'clock position on the  humeral head protecting the axillary nerve.  A Fukuda retractor was placed and an inferior and anterior capsular release was performed palpating and protecting the axillary  nerve with my finger at all times.  The shoulder was dislocated.  The biceps  was tenodesed to the upper biceps groove and circumferential osteophytes  were removed to identify the native head-neck junction.  Reaming was started  behind the biceps groove up to a size 13.  The cut was made in 20  degrees of retroversion and the canal broached to 13.  A trial was placed.  The glenoid was exposed after placing retractors.  The soft tissue was removed  circumferentially.  The center point was marked and the glenoid was prepared in standard fashion.  The base plate was placed with good press-fit.  Two screws were placed with good purchase.  The locking caps were  placed.  The glenosphere was placed with good purchase.  The shoulder was  dislocated and the trial was removed from the canal and irrigated.  The true  stem was placed with good press-fit and trialing demonstrated +5 thickness to offer the best restoration of joint stability, muscle tension and range of motion.  The true polyethylene and tray were placed with similar range of motion and stability.  A 3-minute Betadine wash performed.  The wound was closed in layers with absorbable suture and skin glue.  A sterile dressing and sling were applied.  They were awakened and taken to the recovery room.  There were no complications.  I was present for all portions.  All counts were correct.   Good capillary refill was noted to the hand.      Carl Hairston MD     Date: 1/19/2021  Time: 09:00 EST

## 2021-01-19 NOTE — ANESTHESIA PROCEDURE NOTES
Airway  Date/Time: 1/19/2021 7:30 AM    General Information and Staff    Patient location during procedure: OR  Anesthesiologist: Brett Zepeda MD  CRNA: Jaquelin Carrasco CRNA    Indications and Patient Condition  Indications for airway management: airway protection    Preoxygenated: yes  MILS not maintained throughout  Mask difficulty assessment: 2 - vent by mask + OA or adjuvant +/- NMBA    Final Airway Details  Final airway type: endotracheal airway      Successful airway: ETT  Cuffed: yes   Successful intubation technique: video laryngoscopy  Facilitating devices/methods: intubating stylet  Endotracheal tube insertion site: oral  Blade: Marinelli  Blade size: 3  ETT size (mm): 7.5  Cormack-Lehane Classification: grade III - view of epiglottis only  Placement verified by: chest auscultation and capnometry   Inital cuff pressure (cm H2O): 5  Measured from: lips  ETT/EBT  to lips (cm): 23  Number of attempts at approach: 1  Assessment: lips, teeth, and gum same as pre-op

## 2021-01-20 ENCOUNTER — READMISSION MANAGEMENT (OUTPATIENT)
Dept: CALL CENTER | Facility: HOSPITAL | Age: 61
End: 2021-01-20

## 2021-01-20 VITALS
OXYGEN SATURATION: 97 % | BODY MASS INDEX: 30.98 KG/M2 | HEIGHT: 75 IN | WEIGHT: 249.12 LBS | TEMPERATURE: 97.9 F | SYSTOLIC BLOOD PRESSURE: 127 MMHG | HEART RATE: 51 BPM | DIASTOLIC BLOOD PRESSURE: 78 MMHG | RESPIRATION RATE: 16 BRPM

## 2021-01-20 LAB
ANION GAP SERPL CALCULATED.3IONS-SCNC: 11 MMOL/L (ref 5–15)
BUN SERPL-MCNC: 17 MG/DL (ref 8–23)
BUN/CREAT SERPL: 18.7 (ref 7–25)
CALCIUM SPEC-SCNC: 9.4 MG/DL (ref 8.6–10.5)
CHLORIDE SERPL-SCNC: 102 MMOL/L (ref 98–107)
CO2 SERPL-SCNC: 27 MMOL/L (ref 22–29)
CREAT SERPL-MCNC: 0.91 MG/DL (ref 0.76–1.27)
GFR SERPL CREATININE-BSD FRML MDRD: 85 ML/MIN/1.73
GLUCOSE SERPL-MCNC: 121 MG/DL (ref 65–99)
HCT VFR BLD AUTO: 46.4 % (ref 37.5–51)
HGB BLD-MCNC: 15.6 G/DL (ref 13–17.7)
POTASSIUM SERPL-SCNC: 4.4 MMOL/L (ref 3.5–5.2)
SODIUM SERPL-SCNC: 140 MMOL/L (ref 136–145)

## 2021-01-20 PROCEDURE — 99225 PR SBSQ OBSERVATION CARE/DAY 25 MINUTES: CPT | Performed by: STUDENT IN AN ORGANIZED HEALTH CARE EDUCATION/TRAINING PROGRAM

## 2021-01-20 PROCEDURE — 97161 PT EVAL LOW COMPLEX 20 MIN: CPT

## 2021-01-20 PROCEDURE — 85018 HEMOGLOBIN: CPT | Performed by: ORTHOPAEDIC SURGERY

## 2021-01-20 PROCEDURE — 97535 SELF CARE MNGMENT TRAINING: CPT

## 2021-01-20 PROCEDURE — G0378 HOSPITAL OBSERVATION PER HR: HCPCS

## 2021-01-20 PROCEDURE — 97110 THERAPEUTIC EXERCISES: CPT

## 2021-01-20 PROCEDURE — 85014 HEMATOCRIT: CPT | Performed by: ORTHOPAEDIC SURGERY

## 2021-01-20 PROCEDURE — 25010000002 CEFAZOLIN PER 500 MG: Performed by: ORTHOPAEDIC SURGERY

## 2021-01-20 PROCEDURE — 80048 BASIC METABOLIC PNL TOTAL CA: CPT | Performed by: ORTHOPAEDIC SURGERY

## 2021-01-20 RX ORDER — OXYCODONE HYDROCHLORIDE AND ACETAMINOPHEN 5; 325 MG/1; MG/1
1 TABLET ORAL EVERY 6 HOURS PRN
Qty: 28 TABLET | Refills: 0 | Status: SHIPPED | OUTPATIENT
Start: 2021-01-20 | End: 2021-02-01

## 2021-01-20 RX ORDER — PROMETHAZINE HYDROCHLORIDE 12.5 MG/1
12.5 TABLET ORAL EVERY 6 HOURS PRN
Qty: 21 TABLET | Refills: 1 | Status: SHIPPED | OUTPATIENT
Start: 2021-01-20 | End: 2021-02-01

## 2021-01-20 RX ADMIN — MELOXICAM 15 MG: 15 TABLET ORAL at 08:34

## 2021-01-20 RX ADMIN — LOSARTAN POTASSIUM 25 MG: 25 TABLET, FILM COATED ORAL at 08:33

## 2021-01-20 RX ADMIN — CEFAZOLIN SODIUM 2 G: 10 INJECTION, POWDER, FOR SOLUTION INTRAVENOUS at 08:33

## 2021-01-20 RX ADMIN — LEVOTHYROXINE, LIOTHYRONINE 60 MG: 19; 4.5 TABLET ORAL at 05:20

## 2021-01-20 RX ADMIN — CETIRIZINE HYDROCHLORIDE 10 MG: 10 TABLET, FILM COATED ORAL at 08:33

## 2021-01-20 RX ADMIN — OXYCODONE HYDROCHLORIDE 20 MG: 20 TABLET, FILM COATED, EXTENDED RELEASE ORAL at 08:34

## 2021-01-20 RX ADMIN — ASPIRIN 325 MG: 325 TABLET, COATED ORAL at 08:33

## 2021-01-20 NOTE — DISCHARGE SUMMARY
Date of Discharge:  1/20/2021    Discharge Diagnosis: Left shoulder degenerative disease    Presenting Problem/History of Present Illness  Active Hospital Problems    Diagnosis  POA   • **Osteoarthritis of left glenohumeral joint [M19.012]  Yes   • Thrombocytopenia, chronic [D69.6]  Yes   • Former smoker [Z87.891]  Not Applicable   • DJD of shoulder [M19.019]  Yes   • Acquired hypothyroidism [E03.9]  Yes   • Sleep apnea [G47.30]  Yes   • Obesity (BMI 30-39.9) [E66.9]  Yes   • Benign essential hypertension [I10]  Yes      Resolved Hospital Problems   No resolved problems to display.        Hospital Course  Patient is a 60 y.o. male presented with degenerative disease of the left shoulder underwent reverse arthroplasty, postop day 1 was tolerating diet and oral medication.      Procedures Performed: Left reverse total shoulder      Consults:   Consults     Date and Time Order Name Status Description    1/19/2021 2021 Inpatient Consult to Hospitalist      1/19/2021 1106 Inpatient Hospitalist Consult Completed             Condition on Discharge:  Improved    Vital Signs  Vitals:    01/19/21 2041 01/20/21 0018 01/20/21 0409 01/20/21 0802   BP: 101/61 113/71 112/68 127/78   BP Location: Right arm Right arm Right arm Right arm   Patient Position: Lying Lying Lying Lying   Pulse: 62 53 50 51   Resp: 14 12 12 16   Temp: 97.9 °F (36.6 °C) 98.4 °F (36.9 °C) 97.6 °F (36.4 °C) 97.9 °F (36.6 °C)   TempSrc: Oral Oral Oral Oral   SpO2: 95% 96% 95% 97%   Weight:       Height:           Physical Exam:  Dry dressing, Sensory and motor exam are intact all distributions. Radial pulse is palpable and capillary refill is less than two seconds to all digits       Discharge Disposition  Home    Discharge Medications     Discharge Medications      New Medications      Instructions Start Date   oxyCODONE-acetaminophen 5-325 MG per tablet  Commonly known as: Percocet   1 tablet, Oral, Every 6 Hours PRN      promethazine 12.5 MG  "tablet  Commonly known as: PHENERGAN   12.5 mg, Oral, Every 6 Hours PRN         Changes to Medications      Instructions Start Date   meloxicam 15 MG tablet  Commonly known as: MOBIC  What changed: additional instructions   15 mg, Oral, Daily      montelukast 10 MG tablet  Commonly known as: SINGULAIR  What changed:   · how to take this  · when to take this   TAKE 1 TABLET EVERY DAY      pentoxifylline 400 MG CR tablet  Commonly known as: TRENtal  What changed:   · when to take this  · additional instructions   TAKE 1 TABLET EVERY 12 HOURS         Continue These Medications      Instructions Start Date   anastrozole 1 MG tablet  Commonly known as: ARIMIDEX   Pt states he has never taken this medication      [place] Thyroid 60 MG tablet  Generic drug: Thyroid   1 tablet, Oral, Daily, Take DOS      BD Eclipse Syringe 21G X 1\" 3 ML misc  Generic drug: Syringe/Needle (Disp)   BD ECLIPSE SYRINGE 21G X 1\" 3 ML      cetirizine 10 MG tablet  Commonly known as: zyrTEC   10 mg, Oral, Daily      [employer] Inner Ear Plus tablet   2 tablets, Oral, Daily      DHA OMEGA 3 PO   1 tablet, Oral, Daily, LD 1/12       DHEA 25 MG capsule   1 capsule, Oral, Daily, Hold DOS       fluticasone 50 MCG/ACT nasal spray  Commonly known as: FLONASE   2 sprays, Nasal, Daily      Glucosamine 1500 Complex capsule   2 capsules, Oral, 2 times daily      HYALURONIC ACID PO   Oral, Daily, LD 1/12       Magnesium-Potassium 250-100 MG tablet   1 tablet, Oral, Daily      meclizine 25 MG tablet  Commonly known as: ANTIVERT   25 mg, Oral, 3 Times Daily PRN      multivitamin with minerals tablet tablet   1 tablet, Oral, Daily, LD 1/12       olmesartan 20 MG tablet  Commonly known as: BENICAR   10 mg, Oral, Every Morning, LD 1/17      sildenafil 100 MG tablet  Commonly known as: VIAGRA   SILDENAFIL CITRATE 100 MG TABS      Testosterone Cypionate 200 MG/ML injection  Commonly known as: DEPOTESTOTERONE CYPIONATE   Inject 200 mg into the appropriate muscle as directed " by prescriber. Wed and sat      traMADol HCl  MG capsule sustained-release 24 hr   1 capsule, Oral, As Needed      vitamin D3 125 MCG (5000 UT) capsule capsule   5,000 Units, Oral, Daily, Hold DOS       Voltaren 1 % gel gel  Generic drug: diclofenac   APPLY 2 GRAMS TO AFFECTED G-TUBE AREA TWICE DAILY               Discharge instructions:  Continue sling.  Keep dressing on.  Okay to shower and perform home exercises.  Continue polar care.    Follow-up Appointments  Future Appointments   Date Time Provider Department Center   1/21/2021  3:00 PM Yeny Kapadia, PT [facility]   2/1/2021 12:30 PM Carl Hairston MD [facility]   9/13/2021 11:15 AM Marlo Michaels Jr., MD [facility]              Carl Hairston MD  01/20/21  08:45 EST      Disclaimer: Please note that areas of this note were completed with computer voice recognition software.  Quite often unanticipated grammatical, syntax, homophones, and other interpretive errors are inadvertently transcribed by the computer software. Please excuse any errors that have escaped final proofreading.

## 2021-01-20 NOTE — PROGRESS NOTES
"      [facility] Medicine Services Daily Progress Note      Hospitalist Team  LOS 1 days      Patient Care Team:  Marlo Michaels Jr., MD as PCP - General (Family Medicine)    Patient Location: [facility]/      Subjective   Subjective     Chief Complaint / Subjective  No chief complaint on file.        Brief Synopsis of Hospital Course/HPI  Mr. Wheatley is a 60 y.o. male with a history of osteoarthritis of left glenohumeral joint who presented to [facility] on 01/19/2021 and underwent a left total shoulder reverse arthroplasty by Dr. Hairston. The patient was admitted to the Surgical Inpatient Unit postoperatively and the Hospitalist team was consulted for medical management.       In addition to osteoarthritis, the patient has a history of hypertension, sleep apnea, and hypothyroidism.      Date::    01/20/2021  No overnight events, patient AAOx3 without distress.  Doing well with PT/OT.  Ortho has cleared for discharge home.      ROS  Musculoskeletal: Positive for arthritis.   All other systems reviewed and are negative.    Objective   Objective      Vital Signs  Temp:  [94.4 °F (34.7 °C)-98.4 °F (36.9 °C)] 97.9 °F (36.6 °C)  Heart Rate:  [50-62] 51  Resp:  [12-18] 16  BP: (101-156)/(61-93) 127/78  Oxygen Therapy  SpO2: 97 %  Pulse Oximetry Type: Intermittent  Device (Oxygen Therapy): room air  Flow (L/min): 6  Flowsheet Rows      First Filed Value   Admission Height  190.5 cm (75\") Documented at 01/11/2021 1104   Admission Weight  114 kg (252 lb) Documented at 01/11/2021 1104        Intake & Output (last 3 days)       01/17 0701 - 01/18 0700 01/18 0701 - 01/19 0700 01/19 0701 - 01/20 0700 01/20 0701 - 01/21 0700    P.O.   840 240    I.V. (mL/kg)   1250 (11.1)     Total Intake(mL/kg)   2090 (18.5) 240 (2.1)    Urine (mL/kg/hr)   500 (0.2)     Blood   25     Total Output   525     Net   +1565 +240            Urine Unmeasured Occurrence   3 x         Lines, Drains & Airways    Active LDAs     " Name:   Placement date:   Placement time:   Site:   Days:    Peripheral IV 01/19/21 0624 Anterior;Right Wrist   01/19/21 0624    Wrist   1                  Physical Exam:    Physical Exam  GENERAL: The patient is well developed and nontoxic.  HEENT: Nonicteric sclerae, PERRLA, EOMI. Oropharynx clear. Moist mucous membranes. Conjunctivae appear well perfused.  CHEST: Chest wall is nontender.  HEART: Regular rate and rhythm without murmurs. No MRG  LUNGS: Clear to auscultation bilaterally. No WRR  ABDOMEN: Soft, positive bowel sounds, nontender, no organomegaly.  RECTAL: Deferred.  SKIN: No rash, no excessive bruising, petechiae, or purpura. Left shoulder incision healing well and without drainage or warmth  NEUROLOGIC: Cranial nerves II-XII intact without motor/sensory deficit         Wounds (last 24 hours)      LDA Wound     Row Name 01/20/21 0833 01/20/21 0254 01/19/21 2311       Wound 01/19/21 0752 Left shoulder Incision    Wound - Properties Group Placement Date: 01/19/21  -LT Placement Time: 0752  -LT Side: Left  -LT Location: shoulder  -LT Primary Wound Type: Incision  -LT    Dressing Appearance  dry;intact;no drainage  -AH (r) LP (t) AH (c)  dry;intact;no drainage  -AC  dry;intact;no drainage  -AC    Closure  FISH  -AH (r) LP (t) AH (c)  IFSH  -AC  FISH  -AC    Dressing Care  --  gauze;transparent film  -AC  gauze;transparent film  -AC    Retired Wound - Properties Group Date first assessed: 01/19/21  -LT Time first assessed: 0752  -LT Side: Left  -LT Location: shoulder  -LT Primary Wound Type: Incision  -LT    Row Name 01/19/21 1949 01/19/21 1533 01/19/21 1100       Wound 01/19/21 0752 Left shoulder Incision    Wound - Properties Group Placement Date: 01/19/21  -LT Placement Time: 0752  -LT Side: Left  -LT Location: shoulder  -LT Primary Wound Type: Incision  -LT    Dressing Appearance  dry;intact;no drainage  -AC  dry;intact;no drainage  -MI  dry;intact;no drainage  -MI    Closure  FISH  -AC  FISH  -MI  FISH   -MI    Dressing Care  gauze;transparent film  -AC  --  --    Retired Wound - Properties Group Date first assessed: 01/19/21  -LT Time first assessed: 0752  -LT Side: Left  -LT Location: shoulder  -LT Primary Wound Type: Incision  -LT      User Key  (r) = Recorded By, (t) = Taken By, (c) = Cosigned By    Initials Name Provider Type    Annita Sepulveda RN Registered Nurse    Diann Tsang, RN Registered Nurse    Wolfgang Andres RN Registered Nurse    Vonda Coppola LPN Licensed Nurse     Vonda Moran LPN Licensed Nurse          Procedures:    Procedure(s):  TOTAL SHOULDER REVERSE ARTHROPLASTY          Results Review:     I reviewed the patient's new clinical results.      Lab Results (last 24 hours)     Procedure Component Value Units Date/Time    Basic Metabolic Panel [[id number]]  (Abnormal) Collected: 01/20/21 0256    Specimen: Blood Updated: 01/20/21 0349     Glucose 121 mg/dL      BUN 17 mg/dL      Creatinine 0.91 mg/dL      Sodium 140 mmol/L      Potassium 4.4 mmol/L      Comment: Slight hemolysis detected by analyzer. Results may be affected.        Chloride 102 mmol/L      CO2 27.0 mmol/L      Calcium 9.4 mg/dL      eGFR Non African Amer 85 mL/min/1.73      BUN/Creatinine Ratio 18.7     Anion Gap 11.0 mmol/L     Narrative:      GFR Normal >60  Chronic Kidney Disease <60  Kidney Failure <15      Hemoglobin & Hematocrit, Blood [[id number]]  (Normal) Collected: 01/20/21 0256    Specimen: Blood Updated: 01/20/21 0322     Hemoglobin 15.6 g/dL      Hematocrit 46.4 %         No results found for: HGBA1C            No results found for: LIPASE  Lab Results   Component Value Date    CHOL 132 04/10/2019    TRIG 40 04/10/2019    HDL 42 04/10/2019    LDL 83 04/10/2019       No results found for: INTRAOP, PREDX, FINALDX, COMDX    Microbiology Results (last 10 days)     Procedure Component Value - Date/Time    COVID PRE-OP / PRE-PROCEDURE SCREENING ORDER (NO ISOLATION) - Swab, Nasopharynx [[id number]]   (Normal) Collected: 01/16/21 1133    Lab Status: Final result Specimen: Swab from Nasopharynx Updated: 01/16/21 2210    Narrative:      The following orders were created for panel order COVID PRE-OP / PRE-PROCEDURE SCREENING ORDER (NO ISOLATION) - Swab, Nasopharynx.  Procedure                               Abnormality         Status                     ---------                               -----------         ------                     COVID-19,APTIMA PANTHER,...[[id number]]  Normal              Final result                 Please view results for these tests on the individual orders.    COVID-19,APTIMA PANTHER,[facility] IN-HOUSE, NP/OP SWAB IN UTM/VTM/SALINE TRANSPORT MEDIA,24 HR TAT - Swab, Nasopharynx [[id number]]  (Normal) Collected: 01/16/21 1133    Lab Status: Final result Specimen: Swab from Nasopharynx Updated: 01/16/21 2210     COVID19 Not Detected    Narrative:      Fact sheet for providers: https://www.fda.gov/media/274219/download     Fact sheet for patients: https://www.fda.gov/media/766247/download    Test performed by PCR.          ECG/EMG Results (most recent)     None                    Xr Chest 2 View    Result Date: 1/12/2021  No acute cardiopulmonary abnormality.  Electronically Signed By-Aroldo Crespo MD On:1/12/2021 12:44 PM This report was finalized on [id number] by  Aroldo Crespo MD.    Xr Shoulder 2+ View Left    Result Date: 1/19/2021  Linear lucency over the greater tuberosity of the humerus on one view. This may simply be artifactual, but subtle nondisplaced hairline fracture could have a similar imaging appearance. Left shoulder replacement, which appears appropriately positioned.  Electronically Signed By-Jennifer Murray MD On:1/19/2021 11:11 AM This report was finalized on [id number] by  Jennifer Murray MD.          Xrays, labs reviewed personally by physician.    Medication Review:   I have reviewed the patient's current medication list      Scheduled Meds  aspirin, 325 mg, Oral,  Daily  cetirizine, 10 mg, Oral, Daily  losartan, 25 mg, Oral, Q24H  meloxicam, 15 mg, Oral, Daily  oxyCODONE, 20 mg, Oral, Q12H  pentoxifylline, 400 mg, Oral, Q12H  Thyroid, 60 mg, Oral, Q AM        Meds Infusions  lactated ringers, 1,000 mL, Last Rate: 1,000 mL (01/19/21 0635)  sodium chloride, 9 mL/hr  sodium chloride, 75 mL/hr, Last Rate: 75 mL/hr (01/19/21 1233)        Meds PRN  •  acetaminophen **OR** acetaminophen  •  bisacodyl  •  diphenhydrAMINE **OR** diphenhydrAMINE  •  diphenhydrAMINE  •  magnesium hydroxide  •  Morphine **AND** naloxone  •  ondansetron  •  ondansetron  •  oxyCODONE  •  sodium chloride  •  traMADol        Assessment/Plan   Assessment/Plan     Active Hospital Problems    Diagnosis  POA   • **Osteoarthritis of left glenohumeral joint [M19.012]  Yes   • Thrombocytopenia, chronic [D69.6]  Yes   • Former smoker [Z87.891]  Not Applicable   • DJD of shoulder [M19.019]  Yes   • Acquired hypothyroidism [E03.9]  Yes   • Sleep apnea [G47.30]  Yes   • Obesity (BMI 30-39.9) [E66.9]  Yes   • Benign essential hypertension [I10]  Yes      Resolved Hospital Problems   No resolved problems to display.       MEDICAL DECISION MAKING COMPLEXITY BY PROBLEM:     #Osteoarthritis of left glenohumeral joint / DJD of shoulder  -POD #1 left total shoulder reverse arthroplasty   -post-op care and PRN analgesia per orthopedic surgery  -LUE in sling  -PT/OT to eval and treat  - ortho has cleared for discharge     #Benign essential hypertension, chronic, controlled  -losartan substituted for olmesartan  -monitor BP     #Obesity (BMI 30-39.9)  -BMI 31.14 on admission  -encourage lifestyle modifications      #Sleep apnea  -nocturnal CPAP     #Acquired hypothyroidism  -continue armour thyroid      #Thrombocytopenia, chronic  -likely secondary to [place]   -stable    VTE Prophylaxis -   Mechanical Order History:      Ordered        01/19/21 0714  Place Sequential Compression Device  Once         01/19/21 0714  Maintain  Sequential Compression Device  Continuous                 Pharmalogical Order History:     None            Code Status -   Code Status and Medical Interventions:   Ordered at: 01/19/21 1106     Code Status:    CPR     Medical Interventions (Level of Support Prior to Arrest):    Full       This patient has been examined wearing appropriate Personal Protective Equipment and discussed with Patient. 01/20/21        Discharge Planning  Home today        Electronically signed by Farheen Shah DO, 01/20/21, 10:41 EST.  [facility]

## 2021-01-20 NOTE — PLAN OF CARE
Goal Outcome Evaluation:  Plan of Care Reviewed With: patient  Progress: improving  Outcome Summary: denies pain, block in effect. room air. anticipates home today. SBA tx. encourage to take time/slow down. wants to keep his independence.

## 2021-01-20 NOTE — DISCHARGE INSTR - ACTIVITY
Activity as tolerated.  Keep sling on at all times except to shower, dress, or do exercises.  No lifting or active movement of left shoulder.

## 2021-01-20 NOTE — OUTREACH NOTE
Prep Survey      Responses   [facility] patient discharged from?  [place]   Is LACE score < 7 ?  Yes   Emergency Room discharge w/ pulse ox?  No   Eligibility  [facility]   Date of Admission  01/19/21   Date of Discharge  01/20/21   Discharge Disposition  Home or Self Care   Discharge diagnosis  Left shoulder degenerative disease,    Left reverse total shoulder   Does the patient have one of the following disease processes/diagnoses(primary or secondary)?  Total Joint Replacement   Does the patient have Home health ordered?  No   Is there a DME ordered?  No   Prep survey completed?  Yes          Idania Turner RN

## 2021-01-20 NOTE — THERAPY EVALUATION
Patient Name: Robin Wheatley  : 1960    MRN: [id number]                              Today's Date: 2021       Admit Date: 2021    Visit Dx:     ICD-10-CM ICD-9-CM   1. Osteoarthritis of left glenohumeral joint  M19.012 715.91     Patient Active Problem List   Diagnosis   • Allergic rhinitis   • Arthritis   • Benign essential hypertension   • Deafness   • Kidney stone   • Obesity (BMI 30-39.9)   • Sleep apnea   • Testicular hypofunction   • Vertigo   • Acquired hypothyroidism   • DJD of shoulder   • Osteoarthritis of left glenohumeral joint   • Thrombocytopenia, chronic   • Former smoker     Past Medical History:   Diagnosis Date   • Allergic    • DJD of shoulder 2021   • Hx of difficult intubation    • Hypertension    • Obesity (BMI 30-39.9)    • Osteoarthritis of left glenohumeral joint 2021    Added automatically from request for surgery [id number]   • Sleep apnea     has CPAP   • Testicular hypofunction    • Thrombocytopenia, chronic    • Vertigo      Past Surgical History:   Procedure Laterality Date   • COLON SURGERY     • QUADRICEPS TENDON REPAIR     • SEPTOPLASTY     • SHOULDER SURGERY Bilateral    • SINUS SURGERY     • TOTAL SHOULDER ARTHROPLASTY W/ DISTAL CLAVICLE EXCISION Left 2021    Procedure: TOTAL SHOULDER REVERSE ARTHROPLASTY;  Surgeon: Carl Hairston MD;  Location: [facility] OR;  Service: Orthopedics;  Laterality: Left;     General Information     Row Name 21 1257          Physical Therapy Time and Intention    Document Type  evaluation  -SS     Mode of Treatment  physical therapy  -SS     Row Name 21 1257          General Information    Patient Profile Reviewed  yes  -SS     Prior Level of Function  independent:;ADL's;driving;work  -SS     Existing Precautions/Restrictions  shoulder  -SS     Row Name 21 1257          Living Environment    Lives With  spouse  -SS     Row Name 21 1257          Home Main Entrance    Number of Stairs, Main  Entrance  none  -     Row Name 01/20/21 1257          Cognition    Orientation Status (Cognition)  oriented x 4  -SS       User Key  (r) = Recorded By, (t) = Taken By, (c) = Cosigned By    Initials Name Provider Type    Lilia Davila PT Physical Therapist        Mobility     Row Name 01/20/21 1258          Sit-Stand Transfer    Sit-Stand [place] (Transfers)  independent  -SS     Row Name 01/20/21 1258          Gait/Stairs (Locomotion)    [place] Level (Gait)  independent  -SS     Distance in Feet (Gait)  400'  -SS     [place] Level (Stairs)  independent  -SS     Number of Steps (Stairs)  4  -SS     Comment (Gait/Stairs)  cues for prevneting contact with L UE on doorways, pt verbalizes understanding  -     Row Name 01/20/21 1258          Mobility    Extremity Weight-bearing Status  left upper extremity  -SS     Left Upper Extremity (Weight-bearing Status)  non weight-bearing (NWB)  -SS       User Key  (r) = Recorded By, (t) = Taken By, (c) = Cosigned By    Initials Name Provider Type    Lilia Davila PT Physical Therapist        Obj/Interventions     Row Name 01/20/21 1259          Range of Motion Comprehensive    Comment, General Range of Motion  B LE WFL  -SS     Row Name 01/20/21 1259          Strength Comprehensive (MMT)    Comment, General Manual Muscle Testing (MMT) Assessment  B LE WNL  -SS     Row Name 01/20/21 1259          Balance    Static Standing Balance  WNL  -SS     Dynamic Standing Balance  WNL  -SS     Row Name 01/20/21 1259          Sensory Assessment (Somatosensory)    Sensory Assessment (Somatosensory)  sensation intact  -       User Key  (r) = Recorded By, (t) = Taken By, (c) = Cosigned By    Initials Name Provider Type    Lilia Davila PT Physical Therapist        Goals/Plan    No documentation.       Clinical Impression     Row Name 01/20/21 1259          Pain Scale: Numbers Pre/Post-Treatment    Pretreatment Pain Rating  0/10 - no pain  -      Posttreatment Pain Rating  0/10 - no pain  -     Row Name 01/20/21 1259          Plan of Care Review    Plan of Care Reviewed With  patient  -SS     Outcome Summary  59 y/o M POD 1 L rTSA. Patient ypically independent and active, works full time and rides horses/ropes cattle. He is mobilizing in room upon entry. Education given about sling positioning due to current malpositioning. Addressed and verbalizes understanding. Patient demos safety with functional mobility- transfers, gait and stairs. Pt is safe to d/c home from PT standpoint. PPE: mask, goggles, gloves.  -     Row Name 01/20/21 1259          Therapy Assessment/Plan (PT)    Criteria for Skilled Interventions Met (PT)  no  -SS       User Key  (r) = Recorded By, (t) = Taken By, (c) = Cosigned By    Initials Name Provider Type    Lilia Davila PT Physical Therapist        Outcome Measures    No documentation.         PT Recommendation and Plan     Plan of Care Reviewed With: patient  Outcome Summary: 59 y/o M POD 1 L rTSA. Patient ypically independent and active, works full time and rides horses/ropes cattle. He is mobilizing in room upon entry. Education given about sling positioning due to current malpositioning. Addressed and verbalizes understanding. Patient demos safety with functional mobility- transfers, gait and stairs. Pt is safe to d/c home from PT standpoint. PPE: mask, goggles, gloves.     Time Calculation:   PT Charges     Row Name 01/20/21 1302             Time Calculation    Start Time  0900  -      Stop Time  0910  -      Time Calculation (min)  10 min  -      PT Received On  01/20/21  -         Time Calculation- PT    Total Timed Code Minutes- PT  0 minute(s)  -        User Key  (r) = Recorded By, (t) = Taken By, (c) = Cosigned By    Initials Name Provider Type    Lilia Davila PT Physical Therapist        Therapy Charges for Today     Code Description Service Date Service Provider Modifiers Qty     [id number]  PT EVAL LOW COMPLEXITY 3 1/20/2021 Lilia Sheikh, TERESITA GP 1               Lilia Sheikh, PT  1/20/2021

## 2021-01-20 NOTE — PLAN OF CARE
Goal Outcome Evaluation:  Plan of Care Reviewed With: patient  Progress: improving  Patient is being discharged. Discharge paperwork given to spouse. Will follow-up with Dr. Hairston.

## 2021-01-20 NOTE — PROGRESS NOTES
Discharge Planning Assessment  [facility]     Patient Name: Robin Wheatley  MRN: [id number]  Today's Date: 1/20/2021    Admit Date: 1/19/2021    Discharge Needs Assessment     Row Name 01/20/21 1021       Living Environment    Lives With  spouse    Current Living Arrangements  home/apartment/condo    Provides Primary Care For  no one    Family Caregiver if Needed  spouse    Quality of Family Relationships  helpful;involved;supportive    Able to Return to Prior Arrangements  yes       Resource/Environmental Concerns    Resource/Environmental Concerns  none    Transportation Concerns  car, none       Transition Planning    Patient/Family Anticipates Transition to  home with family    Patient/Family Anticipated Services at Transition  none    Transportation Anticipated  family or friend will provide       Discharge Needs Assessment    Readmission Within the Last 30 Days  no previous admission in last 30 days    Equipment Currently Used at Home  cpap    Concerns to be Addressed  no discharge needs identified    Anticipated Changes Related to Illness  none    Equipment Needed After Discharge  none    Discharge Coordination/Progress  Routine discharge home with Spouse.        Discharge Plan     Row Name 01/20/21 1044       Plan    Plan  Routine discharge home with Spouse. Patient already set up with outpatient PT in [place].    Provided Post Acute Provider List?  N/A    Provided Post Acute Provider Quality & Resource List?  N/A    Patient/Family in Agreement with Plan  yes    Plan Comments  Spoke with patient and spouse. Patient is very independent. DME, PCP, and pharmacy verified. No problems affording food or medications. Spouse is supportive. Discharging home today.        Continued Care and Services - Admitted Since 1/19/2021    Coordination has not been started for this encounter.       Expected Discharge Date and Time     Expected Discharge Date Expected Discharge Time    Jan 20, 2021         Demographic Summary      Row Name 01/20/21 1020       General Information    Admission Type  observation    Arrived From  home    Referral Source  admission list;physician    Reason for Consult  discharge planning    Preferred Language  English     Used During This Interaction  no       Contact Information    Permission Granted to Share Info With                 Met with patient in room wearing PPE: mask, face shield/goggles, gloves, gown.      Maintained distance greater than six feet and spent less than 15 minutes in the room.        Anna [place] [address]   [phone]  office  [phone]  fax  [email or web address]

## 2021-01-20 NOTE — THERAPY TREATMENT NOTE
Patient Name: Robin Wheatley  : 1960    MRN: [id number]                              Today's Date: 2021       Admit Date: 2021    Visit Dx:     ICD-10-CM ICD-9-CM   1. Osteoarthritis of left glenohumeral joint  M19.012 715.91     Patient Active Problem List   Diagnosis   • Allergic rhinitis   • Arthritis   • Benign essential hypertension   • Deafness   • Kidney stone   • Obesity (BMI 30-39.9)   • Sleep apnea   • Testicular hypofunction   • Vertigo   • Acquired hypothyroidism   • DJD of shoulder   • Osteoarthritis of left glenohumeral joint   • Thrombocytopenia, chronic   • Former smoker     Past Medical History:   Diagnosis Date   • Allergic    • DJD of shoulder 2021   • Hx of difficult intubation    • Hypertension    • Obesity (BMI 30-39.9)    • Osteoarthritis of left glenohumeral joint 2021    Added automatically from request for surgery [id number]   • Sleep apnea     has CPAP   • Testicular hypofunction    • Thrombocytopenia, chronic    • Vertigo      Past Surgical History:   Procedure Laterality Date   • COLON SURGERY     • QUADRICEPS TENDON REPAIR     • SEPTOPLASTY     • SHOULDER SURGERY Bilateral    • SINUS SURGERY     • TOTAL SHOULDER ARTHROPLASTY W/ DISTAL CLAVICLE EXCISION Left 2021    Procedure: TOTAL SHOULDER REVERSE ARTHROPLASTY;  Surgeon: Carl Hairston MD;  Location: [facility];  Service: Orthopedics;  Laterality: Left;     General Information     Row Name 21 1613          OT Time and Intention    Document Type  therapy note (daily note)  -MP     Mode of Treatment  occupational therapy  -MP     Row Name 21 1613          General Information    Patient Profile Reviewed  yes  -MP     Existing Precautions/Restrictions  shoulder  -MP     Row Name 21 1613          Living Environment    Lives With  spouse  -MP       User Key  (r) = Recorded By, (t) = Taken By, (c) = Cosigned By    Initials Name Provider Type    Matthew Gaitan OT Occupational  Therapist          Mobility/ADL's     Row Name 01/20/21 1613          Bed Mobility    All Activities, [place] (Bed Mobility)  modified independence  -     Row Name 01/20/21 1613          Transfers    Transfers  sit-stand transfer;bed-chair transfer  -     Bed-Chair [place] (Transfers)  modified independence  -     Sit-Stand [place] (Transfers)  independent  -     Row Name 01/20/21 1613          Functional Mobility    Functional Mobility- Ind. Level  independent  -     Row Name 01/20/21 1613          Activities of Daily Living    BADL Assessment/Intervention  upper body dressing  -     Row Name 01/20/21 1613          Upper Body Dressing Assessment/Training    [place] Level (Upper Body Dressing)  don;doff;upper body dressing skills;pull-over garment;moderate assist (50% patient effort)  -       User Key  (r) = Recorded By, (t) = Taken By, (c) = Cosigned By    Initials Name Provider Type    Matthew Gaitan OT Occupational Therapist        Obj/Interventions     Row Name 01/20/21 1615          Shoulder (Therapeutic Exercise)    Shoulder (Therapeutic Exercise)  PROM (passive range of motion);pendulum exercises  -     Shoulder PROM (Therapeutic Exercise)  left;flexion;aBduction;10 repetitions;other (see comments) x 2 sets TSA protocol flexion up to 120-130* end feel  -     Shoulder Pendulum Exercises (Therapeutic Exercise)  left;30 seconds duration;needs assist to initiate movement  -     Row Name 01/20/21 1615          Balance    Dynamic Standing Balance  WNL  -     Balance Interventions  standing;sitting;sit to stand;supported;static;dynamic;occupation based/functional task;UE activity with balance activity  -     Row Name 01/20/21 1615          Therapeutic Exercise    Therapeutic Exercise  shoulder;elbow/forearm  -       User Key  (r) = Recorded By, (t) = Taken By, (c) = Cosigned By    Initials Name Provider Type    Matthew Gaitan OT Occupational Therapist         Goals/Plan    No documentation.       Clinical Impression     Row Name 01/20/21 1618          Pain Scale: Numbers Pre/Post-Treatment    Pretreatment Pain Rating  0/10 - no pain  -MP     Posttreatment Pain Rating  0/10 - no pain  -MP     Row Name 01/20/21 1618          Plan of Care Review    Plan of Care Reviewed With  patient  -MP     Progress  improving  -MP     Outcome Summary  Pt. demonstrates progress w/ LUE TSA HEP this date w/ up to 130* flexion PROM and pendulum exercise w/ mod VCs to correct pt. trech. Pt. and spouse eudacted on donning/doffing sling, shirt, and ice. Pt. progress limited secondary to impacts to AROM and ADL independence. Recommend OP therapy at d/c to address aforementioned deficits.  -MP     Row Name 01/20/21 1618          Therapy Plan Review/Discharge Plan (OT)    Anticipated Discharge Disposition (OT)  home with assist;home with outpatient therapy services  -MP     Row Name 01/20/21 1618          Vital Signs    Pre Patient Position  Supine  -MP     Intra Patient Position  Standing  -MP     Post Patient Position  Sitting  -MP     Row Name 01/20/21 1618          Positioning and Restraints    Pre-Treatment Position  in bed  -MP     Post Treatment Position  chair  -MP     In Chair  sitting;call light within reach;encouraged to call for assist  -MP       User Key  (r) = Recorded By, (t) = Taken By, (c) = Cosigned By    Initials Name Provider Type    Matthew Gaitan OT Occupational Therapist        Outcome Measures    No documentation.       Occupational Therapy Education                 Title: PT OT SLP Therapies (Resolved)     Topic: Occupational Therapy (Resolved)     Point: ADL training (Resolved)     Description:   Instruct learner(s) on proper safety adaptation and remediation techniques during self care or transfers.   Instruct in proper use of assistive devices.              Learning Progress Summary           Patient Acceptance, E,TB, VU by LP at 1/20/2021 110     Acceptance, E,TB,D,H, VU,NR by  at 1/19/2021 1318   Family Acceptance, E,TB,D,H, VU,NR by  at 1/19/2021 1318                   Point: Home exercise program (Resolved)     Description:   Instruct learner(s) on appropriate technique for monitoring, assisting and/or progressing therapeutic exercises/activities.              Learning Progress Summary           Patient Acceptance, E,TB, VU by LP at 1/20/2021 1103    Acceptance, E,TB,D,H, VU,NR by  at 1/19/2021 1318   Family Acceptance, E,TB,D,H, VU,NR by  at 1/19/2021 1318                   Point: Precautions (Resolved)     Description:   Instruct learner(s) on prescribed precautions during self-care and functional transfers.              Learning Progress Summary           Patient Acceptance, E,TB, VU by  at 1/20/2021 1103    Acceptance, E,TB,D,H, VU,NR by  at 1/19/2021 1318   Family Acceptance, E,TB,D,H, VU,NR by  at 1/19/2021 1318                   Point: Body mechanics (Resolved)     Description:   Instruct learner(s) on proper positioning and spine alignment during self-care, functional mobility activities and/or exercises.              Learning Progress Summary           Patient Acceptance, E,TB, VU by  at 1/20/2021 1103    Acceptance, E,TB,D,H, VU,NR by  at 1/19/2021 1318   Family Acceptance, E,TB,D,H, VU,NR by  at 1/19/2021 1318                               User Key     Initials Effective Dates Name Provider Type Discipline     03/01/19 -  Kathy Pineda OT Occupational Therapist OT     01/20/21 -  Annita Lau LPN Licensed Nurse Nurse              OT Recommendation and Plan     Plan of Care Review  Plan of Care Reviewed With: patient  Progress: improving  Outcome Summary: Pt. demonstrates progress w/ LUE TSA HEP this date w/ up to 130* flexion PROM and pendulum exercise w/ mod VCs to correct pt. trech. Pt. and spouse eudacted on donning/doffing sling, shirt, and ice. Pt. progress limited secondary to impacts to AROM and ADL  independence. Recommend OP therapy at d/c to address aforementioned deficits.     Time Calculation:   Time Calculation- OT     Row Name 01/20/21 1622             Time Calculation- OT    OT Start Time  0935  -MP      OT Stop Time  1008  -MP      OT Time Calculation (min)  33 min  -      Total Timed Code Minutes- OT  33 minute(s)  -MP      OT Received On  01/20/21  -      OT - Next Appointment  01/21/21  -        User Key  (r) = Recorded By, (t) = Taken By, (c) = Cosigned By    Initials Name Provider Type    Matthew Gaitan OT Occupational Therapist        Therapy Charges for Today     Code Description Service Date Service Provider Modifiers Qty    04128495040 HC OT THER PROC EA 15 MIN 1/20/2021 Matthew Chase OT GO 2    57228067989 HC OT SELF CARE/MGMT/TRAIN EA 15 MIN 1/20/2021 Matthew Chase OT GO 1               Matthew Chase OT  1/20/2021

## 2021-01-21 ENCOUNTER — TRANSITIONAL CARE MANAGEMENT TELEPHONE ENCOUNTER (OUTPATIENT)
Dept: CALL CENTER | Facility: HOSPITAL | Age: 61
End: 2021-01-21

## 2021-01-21 ENCOUNTER — TREATMENT (OUTPATIENT)
Dept: PHYSICAL THERAPY | Facility: CLINIC | Age: 61
End: 2021-01-21

## 2021-01-21 DIAGNOSIS — M25.512 ACUTE PAIN OF LEFT SHOULDER: ICD-10-CM

## 2021-01-21 DIAGNOSIS — Z96.612 STATUS POST REVERSE TOTAL REPLACEMENT OF LEFT SHOULDER: Primary | ICD-10-CM

## 2021-01-21 DIAGNOSIS — M19.012 ARTHRITIS OF LEFT SHOULDER REGION: ICD-10-CM

## 2021-01-21 PROCEDURE — 97140 MANUAL THERAPY 1/> REGIONS: CPT | Performed by: PHYSICAL THERAPIST

## 2021-01-21 PROCEDURE — 97161 PT EVAL LOW COMPLEX 20 MIN: CPT | Performed by: PHYSICAL THERAPIST

## 2021-01-21 NOTE — OUTREACH NOTE
Call Center TCM Note      Responses   [facility] patient discharged from?  [facility]   Does the patient have one of the following disease processes/diagnoses(primary or secondary)?  Total Joint Replacement   Joint surgery performed?  Shoulder   TCM attempt successful?  Yes   Call start time  1016   Call end time  1028   Discharge diagnosis  Left shoulder degenerative disease,    Left reverse total shoulder   Is patient permission given to speak with other caregiver?  Yes   Person spoke with today (if not patient) and relationship  Maria Alejandra/spouse   Does the patient have all medications related to this admission filled (includes all antibiotics, pain medications, etc.)  Yes   Is the patient taking all medications as directed (includes completed medication regime)?  Yes   Is the patient able to teach back alternate methods of pain control?  Shoulder-elevate above heart/ keep in sling as advised, Ice, Reposition, Correct alignment, Short, frequent activity   Does the patient have a follow up appointment with their surgeon?  Yes   Has the patient kept scheduled appointments due by today?  N/A   Comments  Patient has a followup with surgeon, but declines PCP appt with Dr Michaels.    Has home health visited the patient within 72 hours of discharge?  N/A   Has all DME been delivered?  No   Psychosocial issues?  No   Has the patient began therapy sessions (either in the home or as an out patient)?  Yes [He starts this afternoon]   Does the patient have a wound vac in place?  No   Has the patient fallen since discharge?  No   Did the patient receive a copy of their discharge instructions?  Yes   Nursing interventions  Reviewed instructions with patient   What is the patient's perception of their functional status since discharge?  Same   Is the patient able to teach back signs and symptoms of infection?  Temp >100.4 for 24h or longer, Incisional drainage, Blisters around incision, Increased swelling or redness around incision  (not associated with surgical edema), Severe discomfort or pain, Changes in mobility, Shortness of breath or chest pain   Is the patient able to teach back how to prevent infection?  Wash hands before and after touching incision, No tub baths, hot tub or swimming, No lotion or creams, Eat well-balanced diet, Shower only as directed by surgeon, Keep incision covered if pets in house, Keep incision covered if drainage   Is the patient able to teach back signs and symptoms of DVT?  Redness in calf, Area hot to touch, Shortness of breath or chest pain, Swelling in calf, Severe pain in calf   Is the patient able to teach back home safety measures?  Ability to shower, Accessibility to necessary areas in home   Did the patient implement home safety suggestions from pre-surgery classes if attended?  N/A   If the patient is a current smoker, are they able to teach back resources for cessation?  Not a smoker   Is the patient/caregiver able to teach back the hierarchy of who to call/visit for symptoms/problems? PCP, Specialist, Home health nurse, Urgent Care, ED, 911  Yes   TCM call completed?  Yes   Wrap up additional comments  Wife reports he is in alot of pain but is not taking his pain medication as often as he should due to constipation. I encouraged wife to call surgeons office for guidance on stool softener and laxative as well as meds for pain control. She verbalized understanding.           Kwesi Cifuentes RN    1/21/2021, 10:28 EST

## 2021-01-21 NOTE — PROGRESS NOTES
Case Management Discharge Note      Final Note: Routine discharge home with Spouse. Patient already set up with outpatient PT in [place].    Provided Post Acute Provider List?: N/A  Provided Post Acute Provider Quality & Resource List?: N/A    Selected Continued Care - Discharged on 1/20/2021 Admission date: 1/19/2021 - Discharge disposition: Home or Self Care                 Final Discharge Disposition Code: 01 - home or self-care

## 2021-01-26 ENCOUNTER — TREATMENT (OUTPATIENT)
Dept: PHYSICAL THERAPY | Facility: CLINIC | Age: 61
End: 2021-01-26

## 2021-01-26 DIAGNOSIS — M19.012 ARTHRITIS OF LEFT SHOULDER REGION: ICD-10-CM

## 2021-01-26 DIAGNOSIS — Z96.612 STATUS POST REVERSE TOTAL REPLACEMENT OF LEFT SHOULDER: Primary | ICD-10-CM

## 2021-01-26 DIAGNOSIS — M25.512 ACUTE PAIN OF LEFT SHOULDER: ICD-10-CM

## 2021-01-26 PROCEDURE — 97110 THERAPEUTIC EXERCISES: CPT | Performed by: PHYSICAL THERAPIST

## 2021-01-26 PROCEDURE — 97140 MANUAL THERAPY 1/> REGIONS: CPT | Performed by: PHYSICAL THERAPIST

## 2021-01-26 NOTE — PROGRESS NOTES
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]   Problems Addressed this Visit     None      Visit Diagnoses     Status post reverse total replacement of left shoulder    -  Primary    Arthritis of left shoulder region        Acute pain of left shoulder          Diagnoses       Codes Comments    Status post reverse total replacement of left shoulder    -  Primary ICD-10-CM: Z96.612  ICD-9-CM: V43.61     Arthritis of left shoulder region     ICD-10-CM: M19.012  ICD-9-CM: 716.91     Acute pain of left shoulder     ICD-10-CM: M25.512  ICD-9-CM: 719.41         Referring practitioner: Carl Hairston, *  Date of Initial Visit: Type: THERAPY  Noted: 2021  Today's Date: 2021    VISIT#: 2    Subjective : Pt reports his shoulder is feeling more sore and stiff. State he has a pulling at the back of his arm too when he bends his elbow and upper arm is all tender. Doing exercises regularly.      Objective : Educated pt in importance and of good posture and instructed pt in scap retraction and UT stretch. Pt demonstrated good understanding and stated that doing scap retraction made his shoulder feel better.  Pt not relaxing arm in sling and in guarded position. Adjusted sling for better position and support. Pt stated that changes made were more comfortable.    See Exercise, Manual, and Modality Logs for complete treatment.     Assessment/Plan : Increased pain at start of session but improved with education, ther ex, and manual techniques. Flexion PROM improved with scap retraction. Will benefit from continued shoulder ROM and gentle scap strengthening per protocol.    Progress per Plan of Care         Timed:         Manual Therapy:    20     mins  07857;     Therapeutic Exercise:    10     mins  68395;     Neuromuscular Michael:        mins  89258;    Therapeutic Activity:          mins   17472;     Gait Training:           mins  90486;     Ultrasound:          mins  02560;    Ionto                                   mins   58179  Self Care                            mins   99783  Canalith Repos                   mins  96316    Un-Timed:  Electrical Stimulation:         mins  94431 ( );  Dry Needling          mins self-pay  Traction          mins 45447  Low Eval          Mins  64483  Mod Eval          Mins  48837  High Eval                            Mins  33528  Re-Eval                               mins  68117    Timed Treatment:   30   mins   Total Treatment:     30   mins    Yeny Kapadia, PT  Physical Therapist

## 2021-01-28 ENCOUNTER — TREATMENT (OUTPATIENT)
Dept: PHYSICAL THERAPY | Facility: CLINIC | Age: 61
End: 2021-01-28

## 2021-01-28 DIAGNOSIS — M19.012 ARTHRITIS OF LEFT SHOULDER REGION: ICD-10-CM

## 2021-01-28 DIAGNOSIS — M25.512 ACUTE PAIN OF LEFT SHOULDER: ICD-10-CM

## 2021-01-28 DIAGNOSIS — Z96.612 STATUS POST REVERSE TOTAL REPLACEMENT OF LEFT SHOULDER: Primary | ICD-10-CM

## 2021-01-28 PROCEDURE — 97110 THERAPEUTIC EXERCISES: CPT | Performed by: PHYSICAL THERAPIST

## 2021-01-28 PROCEDURE — 97140 MANUAL THERAPY 1/> REGIONS: CPT | Performed by: PHYSICAL THERAPIST

## 2021-01-28 NOTE — PROGRESS NOTES
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]   Problems Addressed this Visit     None      Visit Diagnoses     Status post reverse total replacement of left shoulder    -  Primary    Arthritis of left shoulder region        Acute pain of left shoulder          Diagnoses       Codes Comments    Status post reverse total replacement of left shoulder    -  Primary ICD-10-CM: Z96.612  ICD-9-CM: V43.61     Arthritis of left shoulder region     ICD-10-CM: M19.012  ICD-9-CM: 716.91     Acute pain of left shoulder     ICD-10-CM: M25.512  ICD-9-CM: 719.41         Referring practitioner: Carl Hairston, *  Date of Initial Visit: Type: THERAPY  Noted: 2021  Today's Date: 2021    VISIT#: 3    Subjective : Pt states his shoulder is feeling better. Reports he finds himself doing shoulder blade squeezes all the time and that they feel good.      Objective :   PROM: flex = 133 deg, abd = 125 deg, ER = 30 deg, IR = 60 deg  See Exercise, Manual, and Modality Logs for complete treatment.     Assessment/Plan : Good improvement in flex and abd PROM since eval last week. Pt is compliant with HEP. Pt's insurance approved 7 initial visits and PT to submit for additional visits. Feel that pt would continue to do well with decrease PT freq to 1x/wk for the next 2 weeks if MD in agreement. Then return to 2x/wk at 4 wk post-op to progress ther ex per protocol.    Progress per Plan of Care         Timed:         Manual Therapy:    20     mins  57124;     Therapeutic Exercise:    10     mins  48665;     Neuromuscular Michael:        mins  23471;    Therapeutic Activity:          mins  06701;     Gait Training:           mins  17432;     Ultrasound:          mins  25486;    Ionto                                   mins   97761  Self Care                            mins   75603  [facility]                    mins  99671    Un-Timed:  Electrical Stimulation:         mins  54831 ( );  Dry Needling          mins self-pay  Traction          mins 67514  Low Eval          Mins  94336  Mod Eval          Mins  33290  High Eval                            Mins  32632  Re-Eval                               mins  59855    Timed Treatment:   30   mins   Total Treatment:     30   mins    Yeny Kapadia, PT  Physical Therapist

## 2021-02-01 ENCOUNTER — OFFICE VISIT (OUTPATIENT)
Dept: ORTHOPEDIC SURGERY | Facility: CLINIC | Age: 61
End: 2021-02-01

## 2021-02-01 VITALS
WEIGHT: 249 LBS | HEIGHT: 75 IN | HEART RATE: 63 BPM | SYSTOLIC BLOOD PRESSURE: 106 MMHG | DIASTOLIC BLOOD PRESSURE: 73 MMHG | BODY MASS INDEX: 30.96 KG/M2

## 2021-02-01 DIAGNOSIS — M19.012 OSTEOARTHRITIS OF LEFT GLENOHUMERAL JOINT: Primary | ICD-10-CM

## 2021-02-01 PROCEDURE — 99024 POSTOP FOLLOW-UP VISIT: CPT | Performed by: ORTHOPAEDIC SURGERY

## 2021-02-01 NOTE — PATIENT INSTRUCTIONS
Shoulder Arthroplasty: Post- Operative Visit Objectives    1) Review the operative findings, procedures and photos.  2) Make sure medications are effective and not causing problems.  a) Pain: Oxycodone or hydrocodone is for pain. You may take 1 tablet every 6 hours as necessary.  Some patients don’t require the use of these…in those circumstances just use Tylenol extra-strength 1 or 2 tablets every 4-6 hours.   b) NSAIDs. For pain and inflammation.  You can take an over the counter anti-inflammatory of choice such as Aleve, Ibuprofen, Motrin or Advil during this time.  If you have had any problems stop taking these medicines and please tell us!  3) Wound Care:  a) Today we will remove your dressings.  There may be some residual glue on your incision.  It is now ok to shower without covering it. Please avoid submerging the incision for another two weeks.  Continue ice pack as needed.  4) Exercises and Physical Therapy   Continue your physical therapy and daily home exercises focusing especially on overhead motion.  Continue the sling and remove for activities such as showering and bringing you hand to your face or hair, no motion behind your back for the next 4 weeks.  Some shoulder replacements with a rotator cuff repair will have more restrictions and we will go over those.   5) Follow Up appointments Schedule Follow up visits as directed usually in 4 weeks..  6) Notes  Make sure you have all necessary notes and documentation for school or work.  7) Issues: Remember our goal is to make this process smooth and easy if there is any thing you need please ask us or call back [phone]  8)

## 2021-02-01 NOTE — PROGRESS NOTES
"     Patient ID: Robin Wheatley is a 60 y.o. male.  1/19/21 left reverse total shoulder  Pain minimal    Objective:    /73   Pulse 63   Ht 190.5 cm (75\")   Wt 113 kg (249 lb)   BMI 31.12 kg/m²     Physical Examination:    Incision healed without infection,Sensory and motor exam are intact all distributions. Radial pulse is palpable and capillary refill is less than two seconds to all digits    Imaging:  left Shoulder X-Ray  Indication: Postop total shoulder arthroplasty  AP Y and Lateral views  Findings: Reverse total shoulder in position  no bony lesion  Soft tissues normal  not examined joint spaces  Hardware appropriately positioned yes      yes prior studies available for comparison    Assessment:  Doing well after reverse total shoulder    Plan:  Continue sling and therapy, see me in a month    Procedures  "

## 2021-02-02 ENCOUNTER — TREATMENT (OUTPATIENT)
Dept: PHYSICAL THERAPY | Facility: CLINIC | Age: 61
End: 2021-02-02

## 2021-02-02 DIAGNOSIS — Z96.612 STATUS POST REVERSE TOTAL REPLACEMENT OF LEFT SHOULDER: Primary | ICD-10-CM

## 2021-02-02 DIAGNOSIS — M25.512 ACUTE PAIN OF LEFT SHOULDER: ICD-10-CM

## 2021-02-02 DIAGNOSIS — M19.012 ARTHRITIS OF LEFT SHOULDER REGION: ICD-10-CM

## 2021-02-02 PROCEDURE — 97110 THERAPEUTIC EXERCISES: CPT | Performed by: PHYSICAL THERAPIST

## 2021-02-02 PROCEDURE — 97140 MANUAL THERAPY 1/> REGIONS: CPT | Performed by: PHYSICAL THERAPIST

## 2021-02-02 NOTE — PROGRESS NOTES
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]   Problems Addressed this Visit     None      Visit Diagnoses     Status post reverse total replacement of left shoulder    -  Primary    Arthritis of left shoulder region        Acute pain of left shoulder          Diagnoses       Codes Comments    Status post reverse total replacement of left shoulder    -  Primary ICD-10-CM: Z96.612  ICD-9-CM: V43.61     Arthritis of left shoulder region     ICD-10-CM: M19.012  ICD-9-CM: 716.91     Acute pain of left shoulder     ICD-10-CM: M25.512  ICD-9-CM: 719.41         Referring practitioner: Carl Hairston, *  Date of Initial Visit: Type: THERAPY  Noted: 2021  Today's Date: 2021    VISIT#: 4    Subjective : Pt reports his shoulder is feeling pretty good. States that f/u with MD went well yesterday and MD in agreement with PT 1x/wk for a couple weeks. Was released to drive yesterday.    Objective : no changes to exercises today    See Exercise, Manual, and Modality Logs for complete treatment.     Assessment/Plan : Mild tightness flex and abd ROM. Needs continued ROM work and progression of ther ex per protocol.    Progress per Plan of Care         Timed:         Manual Therapy:    15     mins  64001;     Therapeutic Exercise:    15     mins  01962;     Neuromuscular Michael:        mins  16932;    Therapeutic Activity:          mins  45092;     Gait Training:           mins  81809;     Ultrasound:          mins  08013;    Ionto                                   mins   99771  Self Care                            mins   57315  Canalith Repos                   mins  50461    Un-Timed:  Electrical Stimulation:         mins  85213 ( );  Dry Needling          mins self-pay  Traction          mins 10535  Low Eval          Mins  95577  Mod Eval          Mins  27969  High Eval                             Mins  37679  Re-Eval                               mins  28988    Timed Treatment:   30   mins   Total Treatment:     30   mins    Yeny Kapadia, PT  Physical Therapist

## 2021-02-09 ENCOUNTER — TREATMENT (OUTPATIENT)
Dept: PHYSICAL THERAPY | Facility: CLINIC | Age: 61
End: 2021-02-09

## 2021-02-09 DIAGNOSIS — M19.012 ARTHRITIS OF LEFT SHOULDER REGION: ICD-10-CM

## 2021-02-09 DIAGNOSIS — Z96.612 STATUS POST REVERSE TOTAL REPLACEMENT OF LEFT SHOULDER: Primary | ICD-10-CM

## 2021-02-09 DIAGNOSIS — M25.512 ACUTE PAIN OF LEFT SHOULDER: ICD-10-CM

## 2021-02-09 PROCEDURE — 97140 MANUAL THERAPY 1/> REGIONS: CPT | Performed by: PHYSICAL THERAPIST

## 2021-02-09 PROCEDURE — 97110 THERAPEUTIC EXERCISES: CPT | Performed by: PHYSICAL THERAPIST

## 2021-02-09 NOTE — PROGRESS NOTES
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]   Problems Addressed this Visit     None      Visit Diagnoses     Status post reverse total replacement of left shoulder    -  Primary    Arthritis of left shoulder region        Acute pain of left shoulder          Diagnoses       Codes Comments    Status post reverse total replacement of left shoulder    -  Primary ICD-10-CM: Z96.612  ICD-9-CM: V43.61     Arthritis of left shoulder region     ICD-10-CM: M19.012  ICD-9-CM: 716.91     Acute pain of left shoulder     ICD-10-CM: M25.512  ICD-9-CM: 719.41         Referring practitioner: Carl Hairston, *  Date of Initial Visit: Type: THERAPY  Noted: 2021  Today's Date: 2021    VISIT#: 5    Subjective : pt reports that his shoulder is feeling good. Will be happy to get rid of the sling.    Objective : Added bent over rows and ext to neutral. Issued copy of exercises for HEP. Pt demonstrated good understanding.    See Exercise, Manual, and Modality Logs for complete treatment.     Assessment/Plan : Decreased pain at start of session. Good tolerance to all ther ex with flex PROM improving. Continued tightness abd ROM. Plan to progress scap strengthening and initiate supine AAROM next visit per protocol.    Progress per Plan of Care         Timed:         Manual Therapy:    15     mins  92357;     Therapeutic Exercise:    15     mins  02558;     Neuromuscular Michael:        mins  84044;    Therapeutic Activity:          mins  81148;     Gait Training:           mins  20434;     Ultrasound:          mins  86278;    Ionto                                   mins   97072  Self Care                            mins   32925  Canalith Repos                   mins  18200    Un-Timed:  Electrical Stimulation:         mins  79849 ( );  Dry Needling          mins self-pay  Traction           mins 51150  Low Eval          Mins  24645  Mod Eval          Mins  78614  High Eval                            Mins  13773  Re-Eval                               mins  97029    Timed Treatment:   30   mins   Total Treatment:     30   mins    Yeny Kapadia, PT  Physical Therapist

## 2021-02-16 ENCOUNTER — TREATMENT (OUTPATIENT)
Dept: PHYSICAL THERAPY | Facility: CLINIC | Age: 61
End: 2021-02-16

## 2021-02-16 DIAGNOSIS — M25.512 ACUTE PAIN OF LEFT SHOULDER: ICD-10-CM

## 2021-02-16 DIAGNOSIS — Z96.612 STATUS POST REVERSE TOTAL REPLACEMENT OF LEFT SHOULDER: Primary | ICD-10-CM

## 2021-02-16 DIAGNOSIS — M19.012 ARTHRITIS OF LEFT SHOULDER REGION: ICD-10-CM

## 2021-02-16 PROCEDURE — 97110 THERAPEUTIC EXERCISES: CPT | Performed by: PHYSICAL THERAPIST

## 2021-02-16 PROCEDURE — 97140 MANUAL THERAPY 1/> REGIONS: CPT | Performed by: PHYSICAL THERAPIST

## 2021-02-16 NOTE — PROGRESS NOTES
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]   Problems Addressed this Visit     None      Visit Diagnoses     Status post reverse total replacement of left shoulder    -  Primary    Arthritis of left shoulder region        Acute pain of left shoulder          Diagnoses       Codes Comments    Status post reverse total replacement of left shoulder    -  Primary ICD-10-CM: Z96.612  ICD-9-CM: V43.61     Arthritis of left shoulder region     ICD-10-CM: M19.012  ICD-9-CM: 716.91     Acute pain of left shoulder     ICD-10-CM: M25.512  ICD-9-CM: 719.41         Referring practitioner: Carl Hairston, *  Date of Initial Visit: Type: THERAPY  Noted: 2021  Today's Date: 2021    VISIT#: 6    Subjective : States his shoulder is feeling stiff. Has some pain at end range when doing stretches.      Objective : Added supine AAROM press-up and flexion and submax isometrics in flex, abd, and ext. Issued copy of exercises for HEP. Pt demonstrated good understanding.  See Exercise, Manual, and Modality Logs for complete treatment.     Assessment/Plan : Min limitation in flexion ROM, most limitation in abd. Good tolerance to all ther ex with no increase in symptoms. Pt very motivated to return to work. Needs continued ROM and progression of ther ex per protocol.    Progress per Plan of Care         Timed:         Manual Therapy:    10     mins  78207;     Therapeutic Exercise:    20     mins  03500;     Neuromuscular Michael:        mins  84412;    Therapeutic Activity:          mins  70703;     Gait Training:           mins  61526;     Ultrasound:          mins  75123;    Ionto                                   mins   21925  Self Care                            mins   63597  Canalith Repos                   mins  14751    Un-Timed:  Electrical Stimulation:         mins  56797 ( );  Dry  Needling          mins self-pay  Traction          mins 20767  Low Eval          Mins  02447  Mod Eval          Mins  26122  High Eval                            Mins  63204  Re-Eval                               mins  13388    Timed Treatment:   30   mins   Total Treatment:     30   mins    Yeny Kapadia, PT  Physical Therapist

## 2021-02-23 ENCOUNTER — TREATMENT (OUTPATIENT)
Dept: PHYSICAL THERAPY | Facility: CLINIC | Age: 61
End: 2021-02-23

## 2021-02-23 DIAGNOSIS — Z96.612 STATUS POST REVERSE TOTAL REPLACEMENT OF LEFT SHOULDER: Primary | ICD-10-CM

## 2021-02-23 PROCEDURE — 97140 MANUAL THERAPY 1/> REGIONS: CPT | Performed by: PHYSICAL THERAPIST

## 2021-02-23 PROCEDURE — 97110 THERAPEUTIC EXERCISES: CPT | Performed by: PHYSICAL THERAPIST

## 2021-02-23 NOTE — PROGRESS NOTES
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 Patient continues to have limited passive motion restricted by pain.       Objective functional Dash questionnaire at 84% impairment.     See Exercise, Manual, and Modality Logs for complete treatment.     Assessment/Plan Patient making gradual progress towards goals at this time. Patient still limited with strengthening progressions due to protocol restrictions. Patient will continue to benefit from improved base scapular strengthening in order to safely return to work as required to be able to lift 50#.   Plan Goals: STG:  - Increase L shoulder flex PROM to 150 deg in 3 weeks.  degrees currently with PROM   - Pt to demonstrated improved posture with min verbal cues in 3 weeks. MET  - PT to initiate AAROM in 3 weeks. MET  LTG:  - Improve Quick DASH to 20% or less impairment by discharge. NOT MET   - Increase L shoulder flex AROM to 160 deg for improved over head use of L UE by discharge. NOT MET currently 90 degrees   - Increase L shoulder flex strength to 4/5 or greater for safe return to work by discharge. NOT MET  - Pt to rate max pain at 2-3/10 with activity by discharge. NOT MET  - Pt to be independent with HEP by discharge NOT MET  Progress per Plan of Care         Timed:         Manual Therapy:    15     mins   40789;     Therapeutic Exercise:    20     mins  03830;     Neuromuscular Michael:        mins  11048;    Therapeutic Activity:          mins  13875;     Gait Training:           mins  87839;     Ultrasound:          mins  41071;    Ionto                                   mins   05624  Canalith Repos                   mins  07493    Un-Timed:  Electrical Stimulation:         mins  58414 ( );  Dry Needling          mins self-pay  Traction          mins 26693    Timed Treatment:   35   mins   Total Treatment:     35   mins    Pb Bonilla PTA  Physical Therapist

## 2021-02-25 ENCOUNTER — TREATMENT (OUTPATIENT)
Dept: PHYSICAL THERAPY | Facility: CLINIC | Age: 61
End: 2021-02-25

## 2021-02-25 DIAGNOSIS — Z96.612 STATUS POST REVERSE TOTAL REPLACEMENT OF LEFT SHOULDER: Primary | ICD-10-CM

## 2021-02-25 PROCEDURE — 97140 MANUAL THERAPY 1/> REGIONS: CPT | Performed by: PHYSICAL THERAPIST

## 2021-02-25 PROCEDURE — 97110 THERAPEUTIC EXERCISES: CPT | Performed by: PHYSICAL THERAPIST

## 2021-02-25 NOTE — PROGRESS NOTES
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       Objective added blue TB rows     See Exercise, Manual, and Modality Logs for complete treatment.     Assessment/Plan Patient tolerated progressed therapeutic exercise well with no reports of increased symptoms. Patient demonstrated improved passive flexion to 160-165 degrees. Patient will continue to benefit from progressed base scapular strengthening as able to per protocol in order to return to work safely.     Progress per Plan of Care         Timed:         Manual Therapy:    15     mins  67484;     Therapeutic Exercise:    20     mins  51148;     Neuromuscular Michael:        mins  95876;    Therapeutic Activity:          mins  77427;     Gait Training:           mins  26719;     Ultrasound:          mins  33805;    Ionto                                   mins   50255  Canalith Repos                   mins  08998    Un-Timed:  Electrical Stimulation:         mins  65999 ( );  Dry Needling          mins self-pay  Traction          mins 26936    Timed Treatment:   35   mins   Total Treatment:     35   mins    Pb Bonilla PTA  Physical Therapist

## 2021-03-02 ENCOUNTER — TREATMENT (OUTPATIENT)
Dept: PHYSICAL THERAPY | Facility: CLINIC | Age: 61
End: 2021-03-02

## 2021-03-02 DIAGNOSIS — Z96.612 STATUS POST REVERSE TOTAL REPLACEMENT OF LEFT SHOULDER: Primary | ICD-10-CM

## 2021-03-02 PROCEDURE — 97140 MANUAL THERAPY 1/> REGIONS: CPT | Performed by: PHYSICAL THERAPIST

## 2021-03-02 PROCEDURE — 97110 THERAPEUTIC EXERCISES: CPT | Performed by: PHYSICAL THERAPIST

## 2021-03-02 NOTE — PROGRESS NOTES
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       Objective  Added nustep     See Exercise, Manual, and Modality Logs for complete treatment.     Assessment/Plan Robin has attended 9 visits s/p L TSR with good compliance and attendance. Patient is currently measuring 160 degrees AA flexion. Patient tolerated progressed base scapular strengthening exercise with this week being number 6. Patient will continue to benefit from improved mobility and base scapular strengthening in order to meet high demands for return to work as .     Progress per Plan of Care         Timed:         Manual Therapy:    15     mins  59141;     Therapeutic Exercise:    20     mins  69518;     Neuromuscular Michael:        mins  78262;    Therapeutic Activity:          mins  96454;     Gait Training:           mins  04683;     Ultrasound:          mins  95518;    Ionto                                   mins   35803  Canalith Repos                   mins  48458    Un-Timed:  Electrical Stimulation:        mins  28970 (MC );  Dry Needling          mins self-pay  Traction          mins 70349    Timed Treatment:   35   mins   Total Treatment:     35   mins    Pb Bonilla PTA  Physical Therapist

## 2021-03-03 ENCOUNTER — OFFICE VISIT (OUTPATIENT)
Dept: ORTHOPEDIC SURGERY | Facility: CLINIC | Age: 61
End: 2021-03-03

## 2021-03-03 VITALS
DIASTOLIC BLOOD PRESSURE: 70 MMHG | WEIGHT: 249 LBS | HEIGHT: 75 IN | SYSTOLIC BLOOD PRESSURE: 112 MMHG | HEART RATE: 65 BPM | BODY MASS INDEX: 30.96 KG/M2

## 2021-03-03 DIAGNOSIS — Z47.89 ORTHOPEDIC AFTERCARE: Primary | ICD-10-CM

## 2021-03-03 PROCEDURE — 99024 POSTOP FOLLOW-UP VISIT: CPT | Performed by: ORTHOPAEDIC SURGERY

## 2021-03-03 NOTE — PROGRESS NOTES
"     Patient ID: Robin Wheatley is a 60 y.o. male.  1/19/21 left reverse total shoulder  Pain minimal      Objective:    /70   Pulse 65   Ht 190.5 cm (75\")   Wt 113 kg (249 lb)   BMI 31.12 kg/m²     Physical Examination:    Incision healed, no bony tenderness.  Passive elevation 170 degrees abduction 130 degrees external rotation 40 degrees    Imaging:      Assessment:  Doing after reverse shoulder arthroplasty     Plan:  Discontinue sling, advance physical therapy with progressive strengthening and see me with x-ray in 3 months    Procedures  "

## 2021-03-09 ENCOUNTER — TREATMENT (OUTPATIENT)
Dept: PHYSICAL THERAPY | Facility: CLINIC | Age: 61
End: 2021-03-09

## 2021-03-09 DIAGNOSIS — Z96.612 STATUS POST REVERSE TOTAL REPLACEMENT OF LEFT SHOULDER: Primary | ICD-10-CM

## 2021-03-09 DIAGNOSIS — M19.012 ARTHRITIS OF LEFT SHOULDER REGION: ICD-10-CM

## 2021-03-09 DIAGNOSIS — M25.512 ACUTE PAIN OF LEFT SHOULDER: ICD-10-CM

## 2021-03-09 PROCEDURE — 97140 MANUAL THERAPY 1/> REGIONS: CPT | Performed by: PHYSICAL THERAPIST

## 2021-03-09 PROCEDURE — 97110 THERAPEUTIC EXERCISES: CPT | Performed by: PHYSICAL THERAPIST

## 2021-03-09 NOTE — PROGRESS NOTES
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]   Problems Addressed this Visit     None      Visit Diagnoses     Status post reverse total replacement of left shoulder    -  Primary    Arthritis of left shoulder region        Acute pain of left shoulder          Diagnoses       Codes Comments    Status post reverse total replacement of left shoulder    -  Primary ICD-10-CM: Z96.612  ICD-9-CM: V43.61     Arthritis of left shoulder region     ICD-10-CM: M19.012  ICD-9-CM: 716.91     Acute pain of left shoulder     ICD-10-CM: M25.512  ICD-9-CM: 719.41         Referring practitioner: Carl Hairston, *  Date of Initial Visit: Type: THERAPY  Noted: 2021  Today's Date: 3/9/2021    VISIT#: 10    Subjective : Pt reports that MD appt went well last week. Sling was d/c and states it feels so good to be out of it. States he has been trying to use his arm a little but not too much. Had some pain with cleaning out stalls and states he was only resting L hand on pitch fork and not lifting with it. 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  Quick DASH = 77% impairment    Objective : PROM flex = 140 deg, abd = 130 deg, ER = 40 deg.  AROM (standing) flex = 90 deg with good scap mechanics, abd = 75 deg.  Strength not formally assessed this date as pt has only been out of sling x7 days. Flex and abd strength grossly 3-/5 based on AROM assessment.  See Exercise, Manual, and Modality Logs for complete treatment.     Assessment/Plan : Continued pain and discomfort L shoulder, 2/10 at rest and 5-6/10 with movement. Pt is making gains in PROM. Pt d/c from sling at MD f/u last week. Pt will benefit from continued ROM and progression of L scap base and shoulder strengthening per MD protocol for safe return to work. Pt  has to exert up to 50# of force frequently at work. Pt has met 2/8 goals. Quick DASH improved from 86% impairment to 77% impairment.    STG:  - Increase L shoulder flex PROM to 150 deg in 3 weeks. - Progressing  - Pt to demonstrated improved posture with min verbal cues in 3 weeks.- MET  - PT to initiate AAROM in 3 weeks. - MET  LTG:  - Improve Quick DASH to 20% or less impairment by discharge. - Not met  - Increase L shoulder flex AROM to 160 deg for improved over head use of L UE by discharge. - Not met  - Increase L shoulder flex strength to 4/5 or greater for safe return to work by discharge. - Not met  - Pt to rate max pain at 2-3/10 with activity by discharge. - Not met  - Pt to be independent with HEP by discharge. - Progressing    Progress per Plan of Care         Timed:         Manual Therapy:    10     mins  74669;     Therapeutic Exercise:    20     mins  97660;     Neuromuscular Michael:        mins  12741;    Therapeutic Activity:          mins  25830;     Gait Training:           mins  66387;     Ultrasound:          mins  16131;    Ionto                                   mins   25404  Self Care                            mins   82500  Canalith Repos                   mins  01290    Un-Timed:  Electrical Stimulation:         mins  31035 ( );  Dry Needling          mins self-pay  Traction          mins 08178  Low Eval          Mins  74259  Mod Eval          Mins  66936  High Eval                            Mins  96639  Re-Eval                               mins  24101    Timed Treatment:   30   mins   Total Treatment:     30   mins    Yeny Kapadia, PT  Physical Therapist

## 2021-03-11 ENCOUNTER — TREATMENT (OUTPATIENT)
Dept: PHYSICAL THERAPY | Facility: CLINIC | Age: 61
End: 2021-03-11

## 2021-03-11 DIAGNOSIS — M25.512 ACUTE PAIN OF LEFT SHOULDER: ICD-10-CM

## 2021-03-11 DIAGNOSIS — Z96.612 STATUS POST REVERSE TOTAL REPLACEMENT OF LEFT SHOULDER: Primary | ICD-10-CM

## 2021-03-11 DIAGNOSIS — M19.012 ARTHRITIS OF LEFT SHOULDER REGION: ICD-10-CM

## 2021-03-11 PROCEDURE — 97110 THERAPEUTIC EXERCISES: CPT | Performed by: PHYSICAL THERAPIST

## 2021-03-11 PROCEDURE — 97140 MANUAL THERAPY 1/> REGIONS: CPT | Performed by: PHYSICAL THERAPIST

## 2021-03-11 NOTE — PROGRESS NOTES
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]   Problems Addressed this Visit     None      Visit Diagnoses     Status post reverse total replacement of left shoulder    -  Primary    Arthritis of left shoulder region        Acute pain of left shoulder          Diagnoses       Codes Comments    Status post reverse total replacement of left shoulder    -  Primary ICD-10-CM: Z96.612  ICD-9-CM: V43.61     Arthritis of left shoulder region     ICD-10-CM: M19.012  ICD-9-CM: 716.91     Acute pain of left shoulder     ICD-10-CM: M25.512  ICD-9-CM: 719.41         Referring practitioner: Carl Hairston, *  Date of Initial Visit: Type: THERAPY  Noted: 2021  Today's Date: 3/11/2021    VISIT#: 11    Subjective : Pt states his shoulder is feeling pretty good. 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 When lifting his arm, he has a catch at about shoulder height and states he has to bring arm down a bit and elbow out and then he can move past that point. States it is a sharp severe pain when he hits that point, like a nerve pain.      Objective : at ~90 deg flex PROM and AROM, pt requests to reposition shoulder and performs abd and mild IR and then is able to move further. After reposition, pt is in scaption plane. At 90 deg abd PROM, requires repositioning and able to progress to ~100-110 deg.  Educated pt that is it important to following MD orders and protocol for proper healing of tissues for optimal outcomes. Pt verbalized understanding.  See Exercise, Manual, and Modality Logs for complete treatment.     Assessment/Plan : Continued limitation in abd ROM. Good tolerance to all ther ex with flex activities performed in scaption plane. Pt will benefit from continued scap base strengthening and progression of shoulder ther ex per protocol for safe return to work. Pt has  to exert up to 50# of force frequently at work.    Progress per Plan of Care         Timed:         Manual Therapy:    10     mins  95526;     Therapeutic Exercise:    20     mins  66714;     Neuromuscular Michael:        mins  40931;    Therapeutic Activity:          mins  84833;     Gait Training:           mins  75238;     Ultrasound:          mins  03924;    Ionto                                   mins   77545  Self Care                            mins   87569  Canalith Repos                   mins  11106    Un-Timed:  Electrical Stimulation:         mins  19432 ( );  Dry Needling          mins self-pay  Traction          mins 14670  Low Eval          Mins  99514  Mod Eval          Mins  57853  High Eval                            Mins  35853  Re-Eval                               mins  13736    Timed Treatment:   30   mins   Total Treatment:     30   mins    Yeny Kapadia, PT  Physical Therapist

## 2021-03-16 ENCOUNTER — TREATMENT (OUTPATIENT)
Dept: PHYSICAL THERAPY | Facility: CLINIC | Age: 61
End: 2021-03-16

## 2021-03-16 DIAGNOSIS — M25.512 ACUTE PAIN OF LEFT SHOULDER: ICD-10-CM

## 2021-03-16 DIAGNOSIS — M19.012 ARTHRITIS OF LEFT SHOULDER REGION: ICD-10-CM

## 2021-03-16 DIAGNOSIS — Z96.612 STATUS POST REVERSE TOTAL REPLACEMENT OF LEFT SHOULDER: Primary | ICD-10-CM

## 2021-03-16 PROCEDURE — 97110 THERAPEUTIC EXERCISES: CPT | Performed by: PHYSICAL THERAPIST

## 2021-03-16 PROCEDURE — 97140 MANUAL THERAPY 1/> REGIONS: CPT | Performed by: PHYSICAL THERAPIST

## 2021-03-16 NOTE — PROGRESS NOTES
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]   Problems Addressed this Visit     None      Visit Diagnoses     Status post reverse total replacement of left shoulder    -  Primary    Arthritis of left shoulder region        Acute pain of left shoulder          Diagnoses       Codes Comments    Status post reverse total replacement of left shoulder    -  Primary ICD-10-CM: Z96.612  ICD-9-CM: V43.61     Arthritis of left shoulder region     ICD-10-CM: M19.012  ICD-9-CM: 716.91     Acute pain of left shoulder     ICD-10-CM: M25.512  ICD-9-CM: 719.41         Referring practitioner: Carl Hairston, *  Date of Initial Visit: Type: THERAPY  Noted: 2021  Today's Date: 3/16/2021    VISIT#: 12    Subjective : Pt reports his shoulder is feeling pretty good. States it still feels like its catching at shoulder height and has to work it a certain way and can then move past it. Wants to continue PT at 1x/wk due to insurance limiting number of visits.    Objective : Added supine punch with eccentric lowering to exercises.    See Exercise, Manual, and Modality Logs for complete treatment.     Assessment/Plan : Decreased pain at start of session. Good tolerance to ther ex with no increase in pain. Continues to perform abd with mild IR at ~90 deg of flex and abd and then able to move to end range with no difficulty. Mild tightness in flex ROM. PT in agreement with 1x/wk frequency as pt is compliant with HEP and very motivated to return to work.    Progress per Plan of Care         Timed:         Manual Therapy:    10     mins  26717;     Therapeutic Exercise:    20     mins  94132;     Neuromuscular Michael:        mins  99126;    Therapeutic Activity:          mins  85607;     Gait Training:           mins  75262;     Ultrasound:          mins  90600;    Ionto                                   mins    25018  Self Care                            mins   77401  Canalith Repos                   mins  95384    Un-Timed:  Electrical Stimulation:         mins  08284 ( );  Dry Needling          mins self-pay  Traction          mins 37829  Low Eval          Mins  39888  Mod Eval          Mins  76336  High Eval                            Mins  53644  Re-Eval                               mins  04488    Timed Treatment:   30   mins   Total Treatment:     30   mins    Yeny Kapdaia, PT  Physical Therapist

## 2021-03-18 ENCOUNTER — TREATMENT (OUTPATIENT)
Dept: PHYSICAL THERAPY | Facility: CLINIC | Age: 61
End: 2021-03-18

## 2021-03-18 DIAGNOSIS — M25.512 ACUTE PAIN OF LEFT SHOULDER: ICD-10-CM

## 2021-03-18 DIAGNOSIS — M19.012 ARTHRITIS OF LEFT SHOULDER REGION: ICD-10-CM

## 2021-03-18 DIAGNOSIS — Z96.612 STATUS POST REVERSE TOTAL REPLACEMENT OF LEFT SHOULDER: Primary | ICD-10-CM

## 2021-03-18 PROCEDURE — 97110 THERAPEUTIC EXERCISES: CPT | Performed by: PHYSICAL THERAPIST

## 2021-03-18 PROCEDURE — 97140 MANUAL THERAPY 1/> REGIONS: CPT | Performed by: PHYSICAL THERAPIST

## 2021-03-19 NOTE — PROGRESS NOTES
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]   Problems Addressed this Visit     None      Visit Diagnoses     Status post reverse total replacement of left shoulder    -  Primary    Arthritis of left shoulder region        Acute pain of left shoulder          Diagnoses       Codes Comments    Status post reverse total replacement of left shoulder    -  Primary ICD-10-CM: Z96.612  ICD-9-CM: V43.61     Arthritis of left shoulder region     ICD-10-CM: M19.012  ICD-9-CM: 716.91     Acute pain of left shoulder     ICD-10-CM: M25.512  ICD-9-CM: 719.41         Referring practitioner: Carl Hairston, *  Date of Initial Visit: Type: THERAPY  Noted: 2021  Today's Date: 3/19/2021    VISIT#: 13    Subjective : Pt reports that his shoulder is feeling good.     Objective : increased resistance on supine press-up and flex and punch with eccentric lowering.     See Exercise, Manual, and Modality Logs for complete treatment.     Assessment/Plan : Good tolerance to ther ex with no increase in symptoms. Will benefit from continued scapular strengthening and progression of shoulder ther ex as tolerated.    Progress per Plan of Care         Timed:         Manual Therapy:    10     mins  79658;     Therapeutic Exercise:    20     mins  02468;     Neuromuscular Michael:        mins  92612;    Therapeutic Activity:          mins  73581;     Gait Training:           mins  03459;     Ultrasound:          mins  96547;    Ionto                                   mins   57342  Self Care                            mins   52105  Canalith Repos                   mins  76076    Un-Timed:  Electrical Stimulation:         mins  48260 ( );  Dry Needling          mins self-pay  Traction          mins 00406  Low Eval          Mins  30737  Mod Eval          Mins  97286  High Eval                            Mins   69336  Re-Eval                               mins  07109    Timed Treatment:   30   mins   Total Treatment:     30   mins    Yeny Kapadia, PT  Physical Therapist

## 2021-03-23 ENCOUNTER — TREATMENT (OUTPATIENT)
Dept: PHYSICAL THERAPY | Facility: CLINIC | Age: 61
End: 2021-03-23

## 2021-03-23 DIAGNOSIS — M25.512 ACUTE PAIN OF LEFT SHOULDER: ICD-10-CM

## 2021-03-23 DIAGNOSIS — Z96.612 STATUS POST REVERSE TOTAL REPLACEMENT OF LEFT SHOULDER: Primary | ICD-10-CM

## 2021-03-23 DIAGNOSIS — M19.012 ARTHRITIS OF LEFT SHOULDER REGION: ICD-10-CM

## 2021-03-23 PROCEDURE — 97140 MANUAL THERAPY 1/> REGIONS: CPT | Performed by: PHYSICAL THERAPIST

## 2021-03-23 PROCEDURE — 97110 THERAPEUTIC EXERCISES: CPT | Performed by: PHYSICAL THERAPIST

## 2021-03-23 NOTE — PROGRESS NOTES
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]   Problems Addressed this Visit     None      Visit Diagnoses     Status post reverse total replacement of left shoulder    -  Primary    Arthritis of left shoulder region        Acute pain of left shoulder          Diagnoses       Codes Comments    Status post reverse total replacement of left shoulder    -  Primary ICD-10-CM: Z96.612  ICD-9-CM: V43.61     Arthritis of left shoulder region     ICD-10-CM: M19.012  ICD-9-CM: 716.91     Acute pain of left shoulder     ICD-10-CM: M25.512  ICD-9-CM: 719.41         Referring practitioner: Carl Hairston, *  Date of Initial Visit: Type: THERAPY  Noted: 2021  Today's Date: 3/23/2021    VISIT#: 14    Subjective : pt reports he still has a catching at about shoulder height when raising and lowering his arm. States he feels like it is a muscle tearing and is wondering if the parts were put in at the right angle/position.      Objective : Educated pt on importance of scapula position for biomechanics. With cues for scap retraction and depression, pt reported no catching during supine ther ex and was pleased with this.  Added wall push-ups, PNF D2 ext, and resisted IR and ER.  See Exercise, Manual, and Modality Logs for complete treatment.     Assessment/Plan : Continued pain and catching at ~90 deg flex and scaption improved with scap retraction. Pt demonstrating improved scap awareness with cues and education. Good tolerance to ther ex with no increase in symptoms. Will benefit from continued scapular strengthening and progression of shoulder strengthening as tolerated and per protocol.    Progress per Plan of Care         Timed:         Manual Therapy:    10     mins  94232;     Therapeutic Exercise:    20     mins  57833;     Neuromuscular Michael:        mins  25774;    Therapeutic Activity:           mins  58671;     Gait Training:           mins  18919;     Ultrasound:          mins  38245;    Ionto                                   mins   07438  Self Care                            mins   26522  Canalith Repos                   mins  87865    Un-Timed:  Electrical Stimulation:         mins  53885 ( );  Dry Needling          mins self-pay  Traction          mins 90891  Low Eval          Mins  40455  Mod Eval          Mins  86438  High Eval                            Mins  58772  Re-Eval                               mins  88824    Timed Treatment:   30   mins   Total Treatment:     30   mins    Yeny Kapadia PT  Physical Therapist

## 2021-03-25 ENCOUNTER — TREATMENT (OUTPATIENT)
Dept: PHYSICAL THERAPY | Facility: CLINIC | Age: 61
End: 2021-03-25

## 2021-03-25 DIAGNOSIS — M25.512 ACUTE PAIN OF LEFT SHOULDER: ICD-10-CM

## 2021-03-25 DIAGNOSIS — Z96.612 STATUS POST REVERSE TOTAL REPLACEMENT OF LEFT SHOULDER: Primary | ICD-10-CM

## 2021-03-25 DIAGNOSIS — M19.012 ARTHRITIS OF LEFT SHOULDER REGION: ICD-10-CM

## 2021-03-25 PROCEDURE — 97140 MANUAL THERAPY 1/> REGIONS: CPT | Performed by: PHYSICAL THERAPIST

## 2021-03-25 PROCEDURE — 97110 THERAPEUTIC EXERCISES: CPT | Performed by: PHYSICAL THERAPIST

## 2021-03-25 NOTE — PROGRESS NOTES
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]   Problems Addressed this Visit     None      Visit Diagnoses     Status post reverse total replacement of left shoulder    -  Primary    Arthritis of left shoulder region        Acute pain of left shoulder          Diagnoses       Codes Comments    Status post reverse total replacement of left shoulder    -  Primary ICD-10-CM: Z96.612  ICD-9-CM: V43.61     Arthritis of left shoulder region     ICD-10-CM: M19.012  ICD-9-CM: 716.91     Acute pain of left shoulder     ICD-10-CM: M25.512  ICD-9-CM: 719.41         Referring practitioner: Carl Hairston, *  Date of Initial Visit: Type: THERAPY  Noted: 2021  Today's Date: 3/25/2021    VISIT#: 15    Subjective : Pt states his shoulder is feeling better and feels like it is starting to heal. Paying more attention to his posture. Still has a catching States he tried to open a new jar with his L hand like he did prior to surgery and could not open it.    Quick DASH = 75% impairment    Objective : AROM: flex = 105 deg, abd = 72 deg, ER = hand behind head to ~C5, IR = hand to back pocket.  PROM: flex = 145 deg, abd = 165 deg, ER = 55 deg, IR = 65 deg  Strength: flex = 3-/5, abd = 3-/5, IR = 4-/5, ER = 3+/5  Area of catching and pinching during PROM and AROM at approx 90 deg flex and abd. This has improved with increased awareness of scapular position but is still present.  See Exercise, Manual, and Modality Logs for complete treatment.     Assessment/Plan : Pt is making good gains in ROM and tolerating progression of scapular and shoulder strengthening ther ex well per protocol. Continued weakness in flexion and abduction making any overhead tasks very difficult. Pt with improved awareness of scapular positioning during ther ex. Pt will benefit from continued scapular  and shoulder strengthening and shoulder ROM for improved functional use of L UE. Per pt's job description he needs to be able to exert up to 50# of force frequently. 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  STG:  - Increase L shoulder flex PROM to 150 deg in 3 weeks. - Progressing, nearly met  - Pt to demonstrated improved posture with min verbal cues in 3 weeks. - MET  - PT to initiate AAROM in 3 weeks. - MET  LTG:  - Improve Quick DASH to 20% or less impairment by discharge. - Not met  - Increase L shoulder flex AROM to 160 deg for improved over head use of L UE by discharge. - Not met  - Increase L shoulder flex strength to 4/5 or greater for safe return to work by discharge. - Not met  - Pt to rate max pain at 2-3/10 with activity by discharge. - Progressing  - Pt to be independent with HEP by discharge. - MET    Progress per Plan of Care         Timed:         Manual Therapy:    10     mins  56622;     Therapeutic Exercise:    30     mins  94776;     Neuromuscular Michael:        mins  56846;    Therapeutic Activity:          mins  94578;     Gait Training:           mins  87929;     Ultrasound:          mins  37316;    Ionto                                   mins   72856  Self Care                            mins   62724  Canalith Repos                   mins  76659    Un-Timed:  Electrical Stimulation:         mins  93089 ( );  Dry Needling          mins self-pay  Traction          mins 90312  Low Eval          Mins  75136  Mod Eval          Mins  06838  High Eval                            Mins  03760  Re-Eval                               mins  57656    Timed Treatment:   40   mins   Total Treatment:     40   mins    Yeny Kapadia, PT  Physical Therapist

## 2021-03-30 ENCOUNTER — TREATMENT (OUTPATIENT)
Dept: PHYSICAL THERAPY | Facility: CLINIC | Age: 61
End: 2021-03-30

## 2021-03-30 DIAGNOSIS — M19.012 ARTHRITIS OF LEFT SHOULDER REGION: ICD-10-CM

## 2021-03-30 DIAGNOSIS — Z96.612 STATUS POST REVERSE TOTAL REPLACEMENT OF LEFT SHOULDER: Primary | ICD-10-CM

## 2021-03-30 DIAGNOSIS — M25.512 ACUTE PAIN OF LEFT SHOULDER: ICD-10-CM

## 2021-03-30 PROCEDURE — 97140 MANUAL THERAPY 1/> REGIONS: CPT | Performed by: PHYSICAL THERAPIST

## 2021-03-30 PROCEDURE — 97110 THERAPEUTIC EXERCISES: CPT | Performed by: PHYSICAL THERAPIST

## 2021-03-30 NOTE — PROGRESS NOTES
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]   Problems Addressed this Visit     None      Visit Diagnoses     Status post reverse total replacement of left shoulder    -  Primary    Arthritis of left shoulder region        Acute pain of left shoulder          Diagnoses       Codes Comments    Status post reverse total replacement of left shoulder    -  Primary ICD-10-CM: Z96.612  ICD-9-CM: V43.61     Arthritis of left shoulder region     ICD-10-CM: M19.012  ICD-9-CM: 716.91     Acute pain of left shoulder     ICD-10-CM: M25.512  ICD-9-CM: 719.41         Referring practitioner: Carl Hairston, *  Date of Initial Visit: Type: THERAPY  Noted: 2021  Today's Date: 3/30/2021    VISIT#: 16    Subjective : Pt reports that his shoulder is feeling better and is confident he will be able to return to work when doctor releases him. States he had been questioning if he would ever be able to return to work prior to last week. Doing HEP daily.      Objective : increased resistance to mid rows and lat pull downs.     See Exercise, Manual, and Modality Logs for complete treatment.     Assessment/Plan : Decreased pain at start of session. Good tolerance to ther ex with no increase in symptoms. Pt continues to make good gains in strength. Will benefit from continued PT to increase scapular strength for improved shoulder stability and progression of L shoulder strength to improve functional use of L UE and for safe return to work. Per pt's work description, he has to be able to exert up to 50# of force frequently.    Progress per Plan of Care         Timed:         Manual Therapy:    10     mins  59644;     Therapeutic Exercise:    30     mins  89994;     Neuromuscular Michael:        mins  57740;    Therapeutic Activity:          mins  71356;     Gait Training:           mins  99220;      Ultrasound:          mins  07682;    Ionto                                   mins   81766  Self Care                            mins   97886  Canalith Repos                   mins  64427    Un-Timed:  Electrical Stimulation:         mins  58449 ( );  Dry Needling          mins self-pay  Traction          mins 45801  Low Eval          Mins  17054  Mod Eval          Mins  76176  High Eval                            Mins  66470  Re-Eval                               mins  91274    Timed Treatment:   40   mins   Total Treatment:     40   mins    Yeny Kapadia, PT  Physical Therapist

## 2021-04-01 ENCOUNTER — TREATMENT (OUTPATIENT)
Dept: PHYSICAL THERAPY | Facility: CLINIC | Age: 61
End: 2021-04-01

## 2021-04-01 DIAGNOSIS — M25.512 ACUTE PAIN OF LEFT SHOULDER: ICD-10-CM

## 2021-04-01 DIAGNOSIS — Z96.612 STATUS POST REVERSE TOTAL REPLACEMENT OF LEFT SHOULDER: Primary | ICD-10-CM

## 2021-04-01 DIAGNOSIS — M19.012 ARTHRITIS OF LEFT SHOULDER REGION: ICD-10-CM

## 2021-04-01 PROCEDURE — 97110 THERAPEUTIC EXERCISES: CPT | Performed by: PHYSICAL THERAPIST

## 2021-04-01 PROCEDURE — 97530 THERAPEUTIC ACTIVITIES: CPT | Performed by: PHYSICAL THERAPIST

## 2021-04-01 PROCEDURE — 97140 MANUAL THERAPY 1/> REGIONS: CPT | Performed by: PHYSICAL THERAPIST

## 2021-04-01 NOTE — PROGRESS NOTES
Physical Therapy Daily Progress Note    VISIT#: 17    Subjective   Robin Wheatley reports that he is a little sore after scraping a sticker off his trailer. Overall he is doing well.   Pain Ratin    Objective     See Exercise, Manual, and Modality Logs for complete treatment.     Patient Education: exercise progression    Assessment/Plan   Pt tolerated treatment well and was able to progress exercises without complaint of increased pain. Pt is doing better overall and having less pain during the day.     Progress per Plan of Care and Progress strengthening /stabilization /functional activity          Timed:         Manual Therapy:    12     mins  70109;     Therapeutic Exercise:    25     mins  41269;     Neuromuscular Michael:        mins  27030;    Therapeutic Activity:     10     mins  86011;     Gait Training:           mins  40105;     Ultrasound:          mins  45267;    Ionto                                   mins   23480  Self Care                            mins   41905  Canalith Repos                   mins  82046    Un-Timed:  Electrical Stimulation:         mins  44807 ( );  Dry Needling          mins self-pay  Traction          mins 52653  Low Eval          Mins  36168  Mod Eval          Mins  21106  High Eval                            Mins  12510  Re-Eval                               mins  73308    Timed Treatment:   47   mins   Total Treatment:     47   mins    Kathy Avila  Physical Therapist Assistant  [place] License # [id number]

## 2021-04-08 ENCOUNTER — TREATMENT (OUTPATIENT)
Dept: PHYSICAL THERAPY | Facility: CLINIC | Age: 61
End: 2021-04-08

## 2021-04-08 DIAGNOSIS — M25.512 ACUTE PAIN OF LEFT SHOULDER: ICD-10-CM

## 2021-04-08 DIAGNOSIS — Z96.612 STATUS POST REVERSE TOTAL REPLACEMENT OF LEFT SHOULDER: Primary | ICD-10-CM

## 2021-04-08 DIAGNOSIS — M19.012 ARTHRITIS OF LEFT SHOULDER REGION: ICD-10-CM

## 2021-04-08 PROCEDURE — 97140 MANUAL THERAPY 1/> REGIONS: CPT | Performed by: PHYSICAL THERAPIST

## 2021-04-08 PROCEDURE — 97110 THERAPEUTIC EXERCISES: CPT | Performed by: PHYSICAL THERAPIST

## 2021-04-08 NOTE — PROGRESS NOTES
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]   Problems Addressed this Visit     None      Visit Diagnoses     Status post reverse total replacement of left shoulder    -  Primary    Arthritis of left shoulder region        Acute pain of left shoulder          Diagnoses       Codes Comments    Status post reverse total replacement of left shoulder    -  Primary ICD-10-CM: Z96.612  ICD-9-CM: V43.61     Arthritis of left shoulder region     ICD-10-CM: M19.012  ICD-9-CM: 716.91     Acute pain of left shoulder     ICD-10-CM: M25.512  ICD-9-CM: 719.41         Referring practitioner: Carl Hairston, *  Date of Initial Visit: Type: THERAPY  Noted: 2021  Today's Date: 2021    VISIT#: 18    Subjective : Pt reports that his shoulder is not hurting as much and feels stiff. States laying on L side at night hurts some but he still does it for short times due to not able to sleep on his back. Doing exercises daily. Continues to have \"catching\" at shoulder height but is able to move past it quicker now.      Objective : Progressed flex wall slides to green Tband.  Issued green Tband for home use also during PNF D2 ext (seatbelt). Pt demonstrated good understanding.    See Exercise, Manual, and Modality Logs for complete treatment.     Assessment/Plan : Decreased pain at start of session. Good tolerance to ther ex with no increase in symptoms. Pt is very motivated to return to work. Will benefit from continued scapular strengthening for improved stability with overhead use and to progress shoulder strength as tolerated for safe return to work. Pt's job requires up to 50# of force be exerted frequently.    Progress per Plan of Care         Timed:         Manual Therapy:    10     mins  57692;     Therapeutic Exercise:    30     mins  24487;     Neuromuscular Michael:        mins  81299;  "   Therapeutic Activity:          mins  34324;     Gait Training:           mins  97298;     Ultrasound:          mins  17857;    Ionto                                   mins   03270  Self Care                            mins   03871  Canalith Repos                   mins  72944    Un-Timed:  Electrical Stimulation:         mins  41522 ( );  Dry Needling          mins self-pay  Traction          mins 98901  Low Eval          Mins  42456  Mod Eval          Mins  32270  High Eval                            Mins  48818  Re-Eval                               mins  48699    Timed Treatment:   40   mins   Total Treatment:     40   mins    Yeny Kapadia, PT  Physical Therapist

## 2021-04-15 ENCOUNTER — TREATMENT (OUTPATIENT)
Dept: PHYSICAL THERAPY | Facility: CLINIC | Age: 61
End: 2021-04-15

## 2021-04-15 DIAGNOSIS — M19.012 ARTHRITIS OF LEFT SHOULDER REGION: ICD-10-CM

## 2021-04-15 DIAGNOSIS — M25.512 ACUTE PAIN OF LEFT SHOULDER: ICD-10-CM

## 2021-04-15 DIAGNOSIS — Z96.612 STATUS POST REVERSE TOTAL REPLACEMENT OF LEFT SHOULDER: Primary | ICD-10-CM

## 2021-04-15 PROCEDURE — 97140 MANUAL THERAPY 1/> REGIONS: CPT | Performed by: PHYSICAL THERAPIST

## 2021-04-15 PROCEDURE — 97110 THERAPEUTIC EXERCISES: CPT | Performed by: PHYSICAL THERAPIST

## 2021-04-15 NOTE — PROGRESS NOTES
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]   Problems Addressed this Visit     None      Visit Diagnoses     Status post reverse total replacement of left shoulder    -  Primary    Arthritis of left shoulder region        Acute pain of left shoulder          Diagnoses       Codes Comments    Status post reverse total replacement of left shoulder    -  Primary ICD-10-CM: Z96.612  ICD-9-CM: V43.61     Arthritis of left shoulder region     ICD-10-CM: M19.012  ICD-9-CM: 716.91     Acute pain of left shoulder     ICD-10-CM: M25.512  ICD-9-CM: 719.41         Referring practitioner: Carl Hairston, *  Date of Initial Visit: Type: THERAPY  Noted: 2021  Today's Date: 4/15/2021    VISIT#: 19    Subjective : Pt reports he is having less pain. States shoulder still wakes him up at night and having numbness in L hand at night that goes away when he repositions his arm. Shoulder still feels stiff. Pt states he has been doing some light rope throwing in his barn and is asking if he can get back on his horse and do some roping.    Objective : Added flex stretch at doorway and pt reported feeling a good stretch. Increased wt for supine press-ups and flex and bent over rows.    See Exercise, Manual, and Modality Logs for complete treatment.     Assessment/Plan : Decreased pain at start of session. Mild tightness in flexion ROM. Good tolerance to manual techniques and stretching. Good tolerance to ther ex progression and pt is very motivated to increase strengthening. Will benefit from continued scapular strengthening for improved stability with overhead use and to progress shoulder strength as tolerated for safe return to work. Pt's job requires up to 50# of force be exerted frequently.    Progress per Plan of Care         Timed:         Manual Therapy:    10     mins  52488;     Therapeutic  Exercise:    30     mins  67664;     Neuromuscular Michael:        mins  96461;    Therapeutic Activity:          mins  89471;     Gait Training:           mins  72540;     Ultrasound:          mins  35551;    Ionto                                   mins   27691  Self Care                            mins   08786  Canalith Repos                   mins  05680    Un-Timed:  Electrical Stimulation:         mins  20919 ( );  Dry Needling          mins self-pay  Traction          mins 17302  Low Eval          Mins  53884  Mod Eval          Mins  90670  High Eval                            Mins  23789  Re-Eval                               mins  75451    Timed Treatment:   40   mins   Total Treatment:     40   mins    Yeny Kapadia, PT  Physical Therapist

## 2021-04-22 ENCOUNTER — TREATMENT (OUTPATIENT)
Dept: PHYSICAL THERAPY | Facility: CLINIC | Age: 61
End: 2021-04-22

## 2021-04-22 DIAGNOSIS — Z96.612 STATUS POST REVERSE TOTAL REPLACEMENT OF LEFT SHOULDER: Primary | ICD-10-CM

## 2021-04-22 PROCEDURE — 97140 MANUAL THERAPY 1/> REGIONS: CPT | Performed by: PHYSICAL THERAPIST

## 2021-04-22 PROCEDURE — 97110 THERAPEUTIC EXERCISES: CPT | Performed by: PHYSICAL THERAPIST

## 2021-04-22 NOTE — PROGRESS NOTES
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 Continues to have difficulty with reaching behind back.       Objective progressed weight     See Exercise, Manual, and Modality Logs for complete treatment.     Assessment/Plan Mild tightness at end range with passive flexion. Patient demonstrated good tolerance to progressed therapeutic exercise. Patient will continue to benefit from scapular strengthening for improved stability for safe return to work.     Progress per Plan of Care         Timed:         Manual Therapy:    10     mins  49819;     Therapeutic Exercise:    30     mins  71081;     Neuromuscular Michael:        mins  09889;    Therapeutic Activity:          mins  86863;     Gait Training:           mins  23340;     Ultrasound:          mins  62550;    Ionto                                   mins   83287  Canalith Repos                   mins  09878    Un-Timed:  Electrical Stimulation:         mins  22912 ( );  Dry Needling          mins self-pay  Traction          mins 55764    Timed Treatment:   40   mins   Total Treatment:     40   mins    Pb Bonilla PTA  Physical Therapist

## 2021-04-29 ENCOUNTER — TELEPHONE (OUTPATIENT)
Dept: FAMILY MEDICINE CLINIC | Facility: CLINIC | Age: 61
End: 2021-04-29

## 2021-04-29 ENCOUNTER — TREATMENT (OUTPATIENT)
Dept: PHYSICAL THERAPY | Facility: CLINIC | Age: 61
End: 2021-04-29

## 2021-04-29 DIAGNOSIS — Z96.612 STATUS POST REVERSE TOTAL REPLACEMENT OF LEFT SHOULDER: Primary | ICD-10-CM

## 2021-04-29 PROCEDURE — 97140 MANUAL THERAPY 1/> REGIONS: CPT | Performed by: PHYSICAL THERAPIST

## 2021-04-29 PROCEDURE — 97110 THERAPEUTIC EXERCISES: CPT | Performed by: PHYSICAL THERAPIST

## 2021-04-29 RX ORDER — PENTOXIFYLLINE 400 MG/1
400 TABLET, EXTENDED RELEASE ORAL EVERY 12 HOURS
Qty: 180 TABLET | Refills: 0 | Status: SHIPPED | OUTPATIENT
Start: 2021-04-29 | End: 2021-09-28

## 2021-04-29 NOTE — TELEPHONE ENCOUNTER
Caller: SATINDER MANRIQUE    Relationship: Emergency Contact    Best call back number: [phone]      Medication needed:   Requested Prescriptions     Pending Prescriptions Disp Refills   • pentoxifylline (TRENtal) 400 MG CR tablet 180 tablet 0     Sig: Take 1 tablet by mouth Every 12 (Twelve) Hours.       When do you need the refill by: ASAP       Does the patient have less than a 3 day supply:  [] Yes  [x] No    What is the patient's preferred pharmacy: [facility] - [place], [place] - [facility] - [phone] [facility] [phone] FX

## 2021-04-30 ENCOUNTER — TELEPHONE (OUTPATIENT)
Dept: FAMILY MEDICINE CLINIC | Facility: CLINIC | Age: 61
End: 2021-04-30

## 2021-05-04 ENCOUNTER — TREATMENT (OUTPATIENT)
Dept: PHYSICAL THERAPY | Facility: CLINIC | Age: 61
End: 2021-05-04

## 2021-05-04 DIAGNOSIS — Z96.612 STATUS POST REVERSE TOTAL REPLACEMENT OF LEFT SHOULDER: Primary | ICD-10-CM

## 2021-05-04 PROCEDURE — 97140 MANUAL THERAPY 1/> REGIONS: CPT | Performed by: PHYSICAL THERAPIST

## 2021-05-04 PROCEDURE — 97110 THERAPEUTIC EXERCISES: CPT | Performed by: PHYSICAL THERAPIST

## 2021-05-04 NOTE — PROGRESS NOTES
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       Objective increased weight and reps with strengthening.     See Exercise, Manual, and Modality Logs for complete treatment.     Assessment/Plan Patient demonstrated good tolerance to progressed therapeutic exercise with only reports of mild fatigue post treatment.  Patient will continue to benefit from improved scapular strengthening for improved stability with high demand job and ability to lift 50# at a minimum.     Progress per Plan of Care         Timed:         Manual Therapy:    10     mins  90833;     Therapeutic Exercise:    35     mins  41690;     Neuromuscular Michael:        mins  84181;    Therapeutic Activity:          mins  23557;     Gait Training:           mins  67117;     Ultrasound:          mins  03268;    Ionto                                   mins   76874  Canalith Repos                  mins  41297    Un-Timed:  Electrical Stimulation:         mins  29642 ( );  Dry Needling          mins self-pay  Traction          mins 93581    Timed Treatment:   45   mins   Total Treatment:     45   mins    Pb Bonilla PTA  Physical Therapist

## 2021-05-11 ENCOUNTER — TREATMENT (OUTPATIENT)
Dept: PHYSICAL THERAPY | Facility: CLINIC | Age: 61
End: 2021-05-11

## 2021-05-11 DIAGNOSIS — Z96.612 STATUS POST REVERSE TOTAL REPLACEMENT OF LEFT SHOULDER: Primary | ICD-10-CM

## 2021-05-11 DIAGNOSIS — M25.512 ACUTE PAIN OF LEFT SHOULDER: ICD-10-CM

## 2021-05-11 DIAGNOSIS — M19.012 ARTHRITIS OF LEFT SHOULDER REGION: ICD-10-CM

## 2021-05-11 PROCEDURE — 97140 MANUAL THERAPY 1/> REGIONS: CPT | Performed by: PHYSICAL THERAPIST

## 2021-05-11 PROCEDURE — 97530 THERAPEUTIC ACTIVITIES: CPT | Performed by: PHYSICAL THERAPIST

## 2021-05-11 PROCEDURE — 97110 THERAPEUTIC EXERCISES: CPT | Performed by: PHYSICAL THERAPIST

## 2021-05-11 NOTE — PROGRESS NOTES
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 Has mild pain with reaching overhead or out to the side and mild pain at night. Rates max pain at 2-3/10. Has been doing more work at home and no increase in pain. Hopes MD releases him to return to work at f/u on 2021.  Subjective Questionnaire: QuickDASH: 36% impairment  Clinical Progress: improved  Home Program Compliance: Yes  Treatment has included: therapeutic exercise, neuromuscular re-education, manual therapy, therapeutic activity, electrical stimulation, moist heat and cryotherapy    Objective : L shoulder AROM/PROM: flex = 125 deg / 150 deg; abd = 90 deg / 160 deg  Strength: flex 4/5 at shoulder height, 3+/5 overhead, IR and ER = 4/5 to 4+/5, abd = 3-/5    Assessment/Plan : Continued pain with overhead reaching and reaching out to the side. Weakness in flexion above shoulder height and weakness in abduction. Pt is progressing well toward goals and hopes to return to work next month. Pt will benefit from continued scapular strengthening strengthening to improve shoulder stability and progression of shoulder strengthening for successful return to work.    Progress toward previous goals: Partially Met     STG:  - Increase L shoulder flex PROM to 150 deg in 3 weeks. - MET  - Pt to demonstrated improved posture with min verbal cues in 3 weeks. - MET  - PT to initiate AAROM in 3 weeks. - MET  LTG:  - Improve Quick DASH to 20% or less impairment by discharge. - Progressing  - Increase L shoulder flex AROM to 160 deg for improved over head use of L UE by discharge. - Not met  - Increase L shoulder flex  strength to 4/5 or greater for safe return to work by discharge. - Progressing  - Pt to rate max pain at 2-3/10 with activity by discharge. - MET  - Pt to be independent with HEP by discharge. - MET      New Goals  Short-term goals (STG): Pt to demonstrate 120 deg abd AROM in 2 weeks.  Long-term goals (LTG): will continue to work toward established goals.      Recommendations: Continue as planned  Timeframe: up to 12 additional visits if needed.  Prognosis to achieve goals: good    PT Signature: Yeny Kapadia, PT      Based upon review of the patient's progress and continued therapy plan, it is my medical opinion that Robin Wheatley should continue physical therapy treatment at [facility]  [address] [place] [address] [address]  [address] [place] [address] [phone].    Signature: __________________________________  Carl Hairston MD    Timed:         Manual Therapy:    10     mins  87347;     Therapeutic Exercise:    30     mins  33744;     Neuromuscular Michael:        mins  49215;    Therapeutic Activity:     15     mins  71342;     Gait Training:           mins  23152;     Ultrasound:          mins  23984;    Ionto                                   mins   14092  Self Care                            mins   51443  Canalith Repos                   mins  4209    Un-Timed:  Electrical Stimulation:         mins  76878 ( );  Dry Needling          mins self-pay  Traction          mins 47099  Low Eval          Mins  69968  Mod Eval          Mins  08240  High Eval                            Mins  06073  Re-Eval                               mins  45400      Timed Treatment:   55   mins   Total Treatment:     55   mins

## 2021-05-19 ENCOUNTER — TREATMENT (OUTPATIENT)
Dept: PHYSICAL THERAPY | Facility: CLINIC | Age: 61
End: 2021-05-19

## 2021-05-19 DIAGNOSIS — Z96.612 STATUS POST REVERSE TOTAL REPLACEMENT OF LEFT SHOULDER: Primary | ICD-10-CM

## 2021-05-19 DIAGNOSIS — M25.512 ACUTE PAIN OF LEFT SHOULDER: ICD-10-CM

## 2021-05-19 DIAGNOSIS — M19.012 ARTHRITIS OF LEFT SHOULDER REGION: ICD-10-CM

## 2021-05-19 PROCEDURE — 97140 MANUAL THERAPY 1/> REGIONS: CPT | Performed by: PHYSICAL THERAPIST

## 2021-05-19 PROCEDURE — 97110 THERAPEUTIC EXERCISES: CPT | Performed by: PHYSICAL THERAPIST

## 2021-05-19 PROCEDURE — 97530 THERAPEUTIC ACTIVITIES: CPT | Performed by: PHYSICAL THERAPIST

## 2021-05-19 NOTE — PROGRESS NOTES
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]   Problems Addressed this Visit     None      Visit Diagnoses     Status post reverse total replacement of left shoulder    -  Primary    Arthritis of left shoulder region        Acute pain of left shoulder          Diagnoses       Codes Comments    Status post reverse total replacement of left shoulder    -  Primary ICD-10-CM: Z96.612  ICD-9-CM: V43.61     Arthritis of left shoulder region     ICD-10-CM: M19.012  ICD-9-CM: 716.91     Acute pain of left shoulder     ICD-10-CM: M25.512  ICD-9-CM: 719.41         Referring practitioner: Carl Hairston, *  Date of Initial Visit: Type: THERAPY  Noted: 2021  Today's Date: 2021    VISIT#: 24    Subjective : Pt reports his shoulder is feeling stronger but still cannot lift arm out to the side well. Rates current pain/soreness at -2/10. Hopes he is released to return to work in 2 wk at f/u with surgeon.  Quick DASH = 30% impairment    Objective          Active Range of Motion   Left Shoulder   Flexion: 126 degrees   Abduction: 90 degrees   External rotation BTH: C7   Internal rotation BTB: sacrum     Passive Range of Motion   Left Shoulder   Flexion: 158 degrees   Abduction: 180 degrees     Strength/Myotome Testing     Left Shoulder     Planes of Motion   Flexion: 4-   Abduction: 3-   External rotation at 0°: 4   Internal rotation at 0°: 4+     Additional Strength Details  Flexion strength 4/5 at below shoulder height, 3+/5 at 90 deg flexion and above.      Added sidelying shoulder abduction with 3# wt and instructed to perform at home also. Pt demonstrated good understanding and able to complete full range.  See Exercise, Manual, and Modality Logs for complete treatment.     Assessment/Plan : Decreased pain with improved use of L UE. Pt with continued weakness  in flexion and abduction. Pt will benefit from continued scapular strengthening strengthening to improve shoulder stability and progression of shoulder strengthening for successful return to work. Pt's job requires up to 50# of force be exerted frequently.    STG:  - Pt to demonstrate 120 deg abd AROM in 2 weeks. - Not met  - Pt to demonstrated improved posture with min verbal cues in 3 weeks. - MET  - PT to initiate AAROM in 3 weeks. - MET  LTG:  - Improve Quick DASH to 20% or less impairment by discharge. - Progressing steadily  - Increase L shoulder flex AROM to 160 deg for improved over head use of L UE by discharge. - Progressing  - Increase L shoulder flex strength to 4/5 or greater for safe return to work by discharge. - Progressing  - Pt to rate max pain at 2-3/10 with activity by discharge. - MET  - Pt to be independent with HEP by discharge. - MET    Progress per Plan of Care and Progress strengthening /stabilization /functional activity         Timed:         Manual Therapy:    10     mins  32752;     Therapeutic Exercise:    30     mins  75329;     Neuromuscular Michael:        mins  87257;    Therapeutic Activity:     15     mins  01822;     Gait Training:           mins  74810;     Ultrasound:          mins  25444;    Ionto                                   mins   41097  Self Care                            mins   00549  Canalith Repos                   mins  99803    Un-Timed:  Electrical Stimulation:         mins  63926 ( );  Dry Needling          mins self-pay  Traction          mins 83769  Low Eval          Mins  11778  Mod Eval          Mins  97155  High Eval                            Mins  74218  Re-Eval                               mins  75804    Timed Treatment:   55   mins   Total Treatment:     55   mins    Yeny Kapadia, PT  Physical Therapist

## 2021-05-25 ENCOUNTER — TREATMENT (OUTPATIENT)
Dept: PHYSICAL THERAPY | Facility: CLINIC | Age: 61
End: 2021-05-25

## 2021-05-25 DIAGNOSIS — M25.512 ACUTE PAIN OF LEFT SHOULDER: ICD-10-CM

## 2021-05-25 DIAGNOSIS — Z96.612 STATUS POST REVERSE TOTAL REPLACEMENT OF LEFT SHOULDER: Primary | ICD-10-CM

## 2021-05-25 DIAGNOSIS — M19.012 ARTHRITIS OF LEFT SHOULDER REGION: ICD-10-CM

## 2021-05-25 PROCEDURE — 97530 THERAPEUTIC ACTIVITIES: CPT | Performed by: PHYSICAL THERAPIST

## 2021-05-25 PROCEDURE — 97140 MANUAL THERAPY 1/> REGIONS: CPT | Performed by: PHYSICAL THERAPIST

## 2021-05-25 PROCEDURE — 97110 THERAPEUTIC EXERCISES: CPT | Performed by: PHYSICAL THERAPIST

## 2021-05-25 NOTE — PROGRESS NOTES
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 provider found  Date of Initial Visit: Type: THERAPY  Noted: 2021  Today's Date: 2021    VISIT#: 25    Subjective Patient expresses readiness to return to work, and is pleased with progress. Biggest restriction remains with reaching behind back.       Objective Dash 14% impairment    See Exercise, Manual, and Modality Logs for complete treatment.     Assessment/Plan Patient demonstrated good tolerance to therapeutic exercise with mild limitations to active IR to L5. Patient has made gradual gains towards goals at this time.   STG:  - Pt to demonstrate 120 deg abd AROM in 2 weeks. - met  - Pt to demonstrated improved posture with min verbal cues in 3 weeks. - MET  - PT to initiate AAROM in 3 weeks. - MET  LTG:  - Improve Quick DASH to 20% or less impairment by discharge. - MET currently at 14% impairment  - Increase L shoulder flex AROM to 160 deg for improved over head use of L UE by discharge. - MET  - Increase L shoulder flex strength to 4/5 or greater for safe return to work by discharge. -MET  - Pt to rate max pain at 2-3/10 with activity by discharge. - MET  - Pt to be independent with HEP by discharge. - MET     Other Anticipate DC if patient does not schedule following MD appointment with  possible return to work.          Timed:         Manual Therapy:    10     mins  00305;     Therapeutic Exercise:    30     mins  25873;     Neuromuscular Michael:        mins  90774;    Therapeutic Activity:     15     mins  36844;     Gait Training:           mins  68905;     Ultrasound:          mins  33387;    Ionto                                   mins   29695  Canalith Repos                   mins  42209    Un-Timed:  Electrical Stimulation:         mins  27622 ( );  Dry Needling          mins self-pay  Traction          mins 47338    Timed Treatment:   55   mins   Total Treatment:     55   mins    Pb Bonilla PTA  Physical Therapist

## 2021-06-02 ENCOUNTER — OFFICE VISIT (OUTPATIENT)
Dept: ORTHOPEDIC SURGERY | Facility: CLINIC | Age: 61
End: 2021-06-02

## 2021-06-02 VITALS
DIASTOLIC BLOOD PRESSURE: 72 MMHG | WEIGHT: 249 LBS | HEART RATE: 62 BPM | SYSTOLIC BLOOD PRESSURE: 118 MMHG | HEIGHT: 75 IN | BODY MASS INDEX: 30.96 KG/M2

## 2021-06-02 DIAGNOSIS — M19.012 OSTEOARTHRITIS OF LEFT GLENOHUMERAL JOINT: ICD-10-CM

## 2021-06-02 DIAGNOSIS — Z47.89 ORTHOPEDIC AFTERCARE: Primary | ICD-10-CM

## 2021-06-02 PROCEDURE — 99212 OFFICE O/P EST SF 10 MIN: CPT | Performed by: ORTHOPAEDIC SURGERY

## 2021-06-02 NOTE — PROGRESS NOTES
Patient ID: Robin Wheatley is a 60 y.o. male.  Left shoulder pain  1/19/21 left reverse total shoulder  Pain minimal    Review of Systems:  Left shoulder pain mild      Objective:    There were no vitals taken for this visit.    Physical Examination:     Left shoulder demonstrates healed incision no tenderness, active elevation 170 abduction 140 external rotation 40 internal rotation is S1    Imaging:   left Shoulder X-Ray  Indication: Postop reverse total shoulder  AP Y and Lateral views  Findings: Reverse total shoulder in position  no bony lesion  Soft tissues normal  not examined joint spaces  Hardware appropriately positioned yes      yes prior studies available for comparison.    Assessment:    Doing well after reverse total shoulder    Plan:   Okay to return to work without restrictions on June 7, see me with x-ray in 6 months      Procedures          Disclaimer: Please note that areas of this note were completed with computer voice recognition software.  Quite often unanticipated grammatical, syntax, homophones, and other interpretive errors are inadvertently transcribed by the computer software. Please excuse any errors that have escaped final proofreading.

## 2021-06-30 ENCOUNTER — HOSPITAL ENCOUNTER (OUTPATIENT)
Dept: CARDIOLOGY | Facility: HOSPITAL | Age: 61
Discharge: HOME | End: 2021-06-30

## 2021-06-30 ENCOUNTER — LAB (OUTPATIENT)
Dept: LAB | Facility: HOSPITAL | Age: 61
End: 2021-06-30

## 2021-06-30 ENCOUNTER — TRANSCRIBE ORDERS (OUTPATIENT)
Dept: ADMINISTRATIVE | Facility: HOSPITAL | Age: 61
End: 2021-06-30

## 2021-06-30 DIAGNOSIS — Z01.818 PRE-OP EXAMINATION: ICD-10-CM

## 2021-06-30 DIAGNOSIS — Z01.818 PRE-OP EXAMINATION: Primary | ICD-10-CM

## 2021-06-30 LAB
ANION GAP SERPL CALCULATED.3IONS-SCNC: 9.7 MMOL/L (ref 5–15)
BUN SERPL-MCNC: 21 MG/DL (ref 8–23)
BUN/CREAT SERPL: 20.6 (ref 7–25)
CALCIUM SPEC-SCNC: 9.1 MG/DL (ref 8.6–10.5)
CHLORIDE SERPL-SCNC: 106 MMOL/L (ref 98–107)
CO2 SERPL-SCNC: 23.3 MMOL/L (ref 22–29)
CREAT SERPL-MCNC: 1.02 MG/DL (ref 0.76–1.27)
GFR SERPL CREATININE-BSD FRML MDRD: 74 ML/MIN/1.73
GLUCOSE SERPL-MCNC: 87 MG/DL (ref 65–99)
POTASSIUM SERPL-SCNC: 4.5 MMOL/L (ref 3.5–5.2)
SODIUM SERPL-SCNC: 139 MMOL/L (ref 136–145)

## 2021-06-30 PROCEDURE — 93005 ELECTROCARDIOGRAM TRACING: CPT | Performed by: OPHTHALMOLOGY

## 2021-06-30 PROCEDURE — 80048 BASIC METABOLIC PNL TOTAL CA: CPT

## 2021-06-30 PROCEDURE — 93010 ELECTROCARDIOGRAM REPORT: CPT | Performed by: INTERNAL MEDICINE

## 2021-06-30 PROCEDURE — 36415 COLL VENOUS BLD VENIPUNCTURE: CPT

## 2021-07-04 LAB — QT INTERVAL: 389 MS

## 2021-07-21 ENCOUNTER — OFFICE VISIT (OUTPATIENT)
Dept: FAMILY MEDICINE CLINIC | Facility: CLINIC | Age: 61
End: 2021-07-21

## 2021-07-21 VITALS
BODY MASS INDEX: 34.32 KG/M2 | DIASTOLIC BLOOD PRESSURE: 77 MMHG | HEART RATE: 62 BPM | TEMPERATURE: 96.9 F | SYSTOLIC BLOOD PRESSURE: 139 MMHG | HEIGHT: 75 IN | OXYGEN SATURATION: 96 % | WEIGHT: 276 LBS

## 2021-07-21 DIAGNOSIS — I10 BENIGN ESSENTIAL HYPERTENSION: Primary | Chronic | ICD-10-CM

## 2021-07-21 DIAGNOSIS — D69.6 THROMBOCYTOPENIA (HCC): Chronic | ICD-10-CM

## 2021-07-21 DIAGNOSIS — E03.9 ACQUIRED HYPOTHYROIDISM: Chronic | ICD-10-CM

## 2021-07-21 DIAGNOSIS — M19.012 OSTEOARTHRITIS OF LEFT GLENOHUMERAL JOINT: ICD-10-CM

## 2021-07-21 DIAGNOSIS — E29.1 TESTICULAR HYPOFUNCTION: Chronic | ICD-10-CM

## 2021-07-21 DIAGNOSIS — J30.9 ALLERGIC RHINITIS, UNSPECIFIED SEASONALITY, UNSPECIFIED TRIGGER: Chronic | ICD-10-CM

## 2021-07-21 PROCEDURE — 99214 OFFICE O/P EST MOD 30 MIN: CPT | Performed by: FAMILY MEDICINE

## 2021-07-21 RX ORDER — TADALAFIL 5 MG/1
5 TABLET ORAL DAILY PRN
COMMUNITY
End: 2022-01-12

## 2021-07-21 RX ORDER — PREDNISOLONE ACETATE 10 MG/ML
SUSPENSION/ DROPS OPHTHALMIC
COMMUNITY
Start: 2021-07-03 | End: 2022-01-12

## 2021-07-21 RX ORDER — OFLOXACIN 3 MG/ML
SOLUTION/ DROPS OPHTHALMIC
COMMUNITY
Start: 2021-06-30 | End: 2022-01-12

## 2021-07-21 NOTE — PROGRESS NOTES
Subjective   Robin Wheatley is a 60 y.o. male.     Chief Complaint   Patient presents with   • Hypertension     follow up   • Hypothyroidism       HPI  Chief complaint: Hypertension allergic rhinitis thrombocytopenia vertigo hypothyroidism testicular hypofunction    Patient is a 60-year-old white male comes in for follow-up and maintenance of his current problems which include    1.  Hypertension-stable-the patient is current on Benicar 20 mg daily.  He denies headache lightheadedness dizziness or chest pain.    2.  Allergic rhinitis-stable-patient on Singulair 10 mg daily Zyrtec 10 mg daily Flonase 2 sprays each nostril once a day.  Denies sinus congestion drainage cough shortness of breath or wheezing.    3.  Testicular hypofunction-stable-patient is on Arimidex 1 mg daily testosterone 200 mg IM every other week and DHEA.  Patient asked whether I will start him his testosterone injections or provide his testosterone.  Patient was advised on wound to do this.  He was advised of the increased risk of stroke and heart attack with taking testosterone.    4.  Hypothyroidism-stable-patient on [place] Thyroid 60 mg daily.  Denied heat or cold intolerance tremor weight gain or weight loss.    5.  Vertigo/deafness-stable-patient on Trental 400 mg every 12 hours.  He also takes Antivert as needed.  Patient wears hearing aids.    6.  Arthritis-stable active osteoarthritis involving multiple joints.  Patient is currently on Mobic 15 mg as needed once a day.    7.  Retinal detachment-patient states that since the event in the past few days he suffered a retinal detachment.  He is currently being followed by ophthalmology and receiving eyedrops.        The following portions of the patient's history were reviewed and updated as appropriate: allergies, current medications, past family history, past medical history, past social history, past surgical history and problem list.    Review of Systems    Objective     /77 (BP  "Location: Right arm, Patient Position: Sitting, Cuff Size: Adult)   Pulse 62   Temp 96.9 °F (36.1 °C) (Infrared)   Ht 190.5 cm (75\")   Wt 125 kg (276 lb)   SpO2 96%   BMI 34.50 kg/m²     Physical Exam  Vitals and nursing note reviewed.   Constitutional:       Appearance: He is well-developed and normal weight.   HENT:      Head: Normocephalic and atraumatic.      Nose: Nose normal.      Mouth/Throat:      Mouth: Mucous membranes are moist.      Pharynx: Oropharynx is clear.   Eyes:      Pupils: Pupils are equal, round, and reactive to light.   Cardiovascular:      Rate and Rhythm: Normal rate and regular rhythm.      Pulses: Normal pulses.      Heart sounds: Normal heart sounds.   Pulmonary:      Effort: Pulmonary effort is normal.      Breath sounds: Normal breath sounds.   Abdominal:      General: Abdomen is flat. Bowel sounds are normal.      Palpations: Abdomen is soft.   Musculoskeletal:         General: Normal range of motion.      Cervical back: Neck supple.   Skin:     General: Skin is warm and dry.   Neurological:      Mental Status: He is alert and oriented to person, place, and time.   Psychiatric:         Behavior: Behavior normal.         Thought Content: Thought content normal.         Judgment: Judgment normal.         Basic Hemoglobin & Hematocrit, Blood (01/20/2021 02:56)  Basic Metabolic Panel (01/20/2021 02:56)  COVID PRE-OP / PRE-PROCEDURE SCREENING ORDER (NO ISOLATION) - Swab, Nasopharynx (01/16/2021 11:33)  Metabolic Panel (06/30/2021 15:36)  Hemoglobin A1c (01/12/2021 11:12)    Assessment/Plan   Diagnoses and all orders for this visit:    1. Benign essential hypertension (Primary)    2. Thrombocytopenia, chronic    3. Allergic rhinitis, unspecified seasonality, unspecified trigger    4. Testicular hypofunction    5. Acquired hypothyroidism    6. Osteoarthritis of left glenohumeral joint      Patient Instructions   Continue your current medications and treatment.    Follow up on the " office in 6 months.    Laboratory testing at that time.          Marlo Michaels Jr., MD    07/21/21

## 2021-07-21 NOTE — PATIENT INSTRUCTIONS
Continue your current medications and treatment.    Follow up on the office in 6 months.    Laboratory testing at that time.

## 2021-07-26 ENCOUNTER — TELEPHONE (OUTPATIENT)
Dept: FAMILY MEDICINE CLINIC | Facility: CLINIC | Age: 61
End: 2021-07-26

## 2021-07-26 RX ORDER — THYROID 60 MG
60 TABLET ORAL DAILY
Qty: 90 TABLET | Refills: 1 | Status: SHIPPED | OUTPATIENT
Start: 2021-07-26 | End: 2022-03-22 | Stop reason: SDUPTHER

## 2021-07-26 NOTE — TELEPHONE ENCOUNTER
PATIENT STATED THAT HE GAVE THE WRONG PHARMACY AND THE REFILL REQUEST NEEDS TO GO TO:    PHARMACY [facility] - [place], [place] - [address] - [phone] [facility] [phone]   [phone]    CALL BACK [phone]

## 2021-07-26 NOTE — TELEPHONE ENCOUNTER
Patient wants rx for [place] Thyroid 60MG sent to [facility].    Patient states Cialis 5mg needs added to his medication list.

## 2021-08-30 ENCOUNTER — DOCUMENTATION (OUTPATIENT)
Dept: PHYSICAL THERAPY | Facility: CLINIC | Age: 61
End: 2021-08-30

## 2021-08-30 DIAGNOSIS — M19.012 ARTHRITIS OF LEFT SHOULDER REGION: ICD-10-CM

## 2021-08-30 DIAGNOSIS — M25.512 ACUTE PAIN OF LEFT SHOULDER: ICD-10-CM

## 2021-08-30 DIAGNOSIS — Z96.612 STATUS POST REVERSE TOTAL REPLACEMENT OF LEFT SHOULDER: Primary | ICD-10-CM

## 2021-08-30 NOTE — PROGRESS NOTES
Discharge Summary  Discharge Summary from Physical Therapy Report      Dates  PT visit: 1/21/2021 - 5/25/2021  Number of Visits: 25     Discharge Status of Patient: See MD Note dated 5/25/2021    Goals: All Met    Discharge Plan: Continue with current home exercise program as instructed    Comments : At last visit pt stated he would call if additional PT recommended after PT appt. No additional appts ever made.    Date of Discharge 8/30/2021        Yeny Kapadia, PT  Physical Therapist

## 2021-09-28 RX ORDER — PENTOXIFYLLINE 400 MG/1
TABLET, EXTENDED RELEASE ORAL
Qty: 180 TABLET | Refills: 0 | Status: SHIPPED | OUTPATIENT
Start: 2021-09-28 | End: 2021-12-14 | Stop reason: SDUPTHER

## 2021-09-28 RX ORDER — MELOXICAM 15 MG/1
TABLET ORAL
Qty: 90 TABLET | Refills: 2 | Status: SHIPPED | OUTPATIENT
Start: 2021-09-28 | End: 2022-06-27 | Stop reason: SDUPTHER

## 2021-11-15 ENCOUNTER — ON CAMPUS - OUTPATIENT (AMBULATORY)
Dept: URBAN - METROPOLITAN AREA HOSPITAL 2 | Facility: HOSPITAL | Age: 61
End: 2021-11-15
Payer: COMMERCIAL

## 2021-11-15 ENCOUNTER — OFFICE (AMBULATORY)
Dept: URBAN - METROPOLITAN AREA PATHOLOGY 4 | Facility: PATHOLOGY | Age: 61
End: 2021-11-15
Payer: COMMERCIAL

## 2021-11-15 VITALS
HEART RATE: 58 BPM | DIASTOLIC BLOOD PRESSURE: 56 MMHG | OXYGEN SATURATION: 97 % | HEART RATE: 56 BPM | SYSTOLIC BLOOD PRESSURE: 112 MMHG | HEART RATE: 55 BPM | DIASTOLIC BLOOD PRESSURE: 57 MMHG | HEART RATE: 50 BPM | OXYGEN SATURATION: 96 % | RESPIRATION RATE: 16 BRPM | HEIGHT: 75 IN | DIASTOLIC BLOOD PRESSURE: 84 MMHG | SYSTOLIC BLOOD PRESSURE: 88 MMHG | OXYGEN SATURATION: 100 % | SYSTOLIC BLOOD PRESSURE: 98 MMHG | TEMPERATURE: 98.1 F | SYSTOLIC BLOOD PRESSURE: 139 MMHG | DIASTOLIC BLOOD PRESSURE: 72 MMHG | WEIGHT: 259 LBS | OXYGEN SATURATION: 99 % | SYSTOLIC BLOOD PRESSURE: 109 MMHG | OXYGEN SATURATION: 95 % | DIASTOLIC BLOOD PRESSURE: 68 MMHG | SYSTOLIC BLOOD PRESSURE: 113 MMHG | HEART RATE: 60 BPM

## 2021-11-15 DIAGNOSIS — Z12.11 ENCOUNTER FOR SCREENING FOR MALIGNANT NEOPLASM OF COLON: ICD-10-CM

## 2021-11-15 DIAGNOSIS — D12.0 BENIGN NEOPLASM OF CECUM: ICD-10-CM

## 2021-11-15 DIAGNOSIS — K57.30 DIVERTICULOSIS OF LARGE INTESTINE WITHOUT PERFORATION OR ABS: ICD-10-CM

## 2021-11-15 DIAGNOSIS — K64.1 SECOND DEGREE HEMORRHOIDS: ICD-10-CM

## 2021-11-15 PROBLEM — K63.5 POLYP OF COLON: Status: ACTIVE | Noted: 2021-11-15

## 2021-11-15 LAB
GI HISTOLOGY: A. UNSPECIFIED: (no result)
GI HISTOLOGY: PDF REPORT: (no result)

## 2021-11-15 PROCEDURE — 88305 TISSUE EXAM BY PATHOLOGIST: CPT | Mod: 33 | Performed by: INTERNAL MEDICINE

## 2021-11-15 PROCEDURE — 45385 COLONOSCOPY W/LESION REMOVAL: CPT | Mod: 33 | Performed by: INTERNAL MEDICINE

## 2021-12-09 ENCOUNTER — OFFICE VISIT (OUTPATIENT)
Dept: ORTHOPEDIC SURGERY | Facility: CLINIC | Age: 61
End: 2021-12-09

## 2021-12-09 VITALS
RESPIRATION RATE: 20 BRPM | SYSTOLIC BLOOD PRESSURE: 112 MMHG | DIASTOLIC BLOOD PRESSURE: 70 MMHG | HEART RATE: 60 BPM | HEIGHT: 75 IN | BODY MASS INDEX: 32.85 KG/M2 | OXYGEN SATURATION: 98 % | WEIGHT: 264.2 LBS

## 2021-12-09 DIAGNOSIS — M19.012 OSTEOARTHRITIS OF LEFT GLENOHUMERAL JOINT: Primary | ICD-10-CM

## 2021-12-09 PROCEDURE — 99212 OFFICE O/P EST SF 10 MIN: CPT | Performed by: ORTHOPAEDIC SURGERY

## 2021-12-14 RX ORDER — PENTOXIFYLLINE 400 MG/1
400 TABLET, EXTENDED RELEASE ORAL EVERY 12 HOURS
Qty: 180 TABLET | Refills: 0 | Status: SHIPPED | OUTPATIENT
Start: 2021-12-14 | End: 2022-04-14

## 2021-12-14 RX ORDER — OLMESARTAN MEDOXOMIL 20 MG/1
10 TABLET ORAL EVERY MORNING
Qty: 90 TABLET | Refills: 0 | Status: SHIPPED | OUTPATIENT
Start: 2021-12-14 | End: 2022-06-27 | Stop reason: SDUPTHER

## 2021-12-29 ENCOUNTER — HOSPITAL ENCOUNTER (OUTPATIENT)
Dept: CT IMAGING | Facility: HOSPITAL | Age: 61
Discharge: HOME | End: 2021-12-29

## 2021-12-29 ENCOUNTER — OFFICE VISIT (OUTPATIENT)
Dept: FAMILY MEDICINE CLINIC | Facility: CLINIC | Age: 61
End: 2021-12-29

## 2021-12-29 ENCOUNTER — TELEPHONE (OUTPATIENT)
Dept: FAMILY MEDICINE CLINIC | Facility: CLINIC | Age: 61
End: 2021-12-29

## 2021-12-29 ENCOUNTER — LAB (OUTPATIENT)
Dept: LAB | Facility: HOSPITAL | Age: 61
End: 2021-12-29

## 2021-12-29 VITALS
DIASTOLIC BLOOD PRESSURE: 80 MMHG | HEIGHT: 75 IN | BODY MASS INDEX: 33.32 KG/M2 | RESPIRATION RATE: 18 BRPM | WEIGHT: 268 LBS | OXYGEN SATURATION: 97 % | HEART RATE: 59 BPM | SYSTOLIC BLOOD PRESSURE: 132 MMHG | TEMPERATURE: 96.9 F

## 2021-12-29 DIAGNOSIS — R10.12 LEFT UPPER QUADRANT ABDOMINAL PAIN: ICD-10-CM

## 2021-12-29 DIAGNOSIS — N23 RENAL COLIC ON LEFT SIDE: ICD-10-CM

## 2021-12-29 DIAGNOSIS — R10.12 LEFT UPPER QUADRANT ABDOMINAL PAIN: Primary | ICD-10-CM

## 2021-12-29 DIAGNOSIS — N20.0 KIDNEY STONE: ICD-10-CM

## 2021-12-29 DIAGNOSIS — N20.0 KIDNEY STONE ON LEFT SIDE: Primary | ICD-10-CM

## 2021-12-29 DIAGNOSIS — N20.0 KIDNEY STONE: Primary | ICD-10-CM

## 2021-12-29 LAB
ALBUMIN SERPL-MCNC: 4.1 G/DL (ref 3.5–5.2)
ALBUMIN/GLOB SERPL: 2.1 G/DL
ALP SERPL-CCNC: 67 U/L (ref 39–117)
ALT SERPL W P-5'-P-CCNC: 10 U/L (ref 1–41)
AMYLASE SERPL-CCNC: 57 U/L (ref 28–100)
ANION GAP SERPL CALCULATED.3IONS-SCNC: 8.8 MMOL/L (ref 5–15)
AST SERPL-CCNC: 21 U/L (ref 1–40)
BACTERIA UR QL AUTO: ABNORMAL /HPF
BASOPHILS # BLD AUTO: 0.05 10*3/MM3 (ref 0–0.2)
BASOPHILS NFR BLD AUTO: 0.8 % (ref 0–1.5)
BILIRUB SERPL-MCNC: 0.3 MG/DL (ref 0–1.2)
BILIRUB UR QL STRIP: NEGATIVE
BUN SERPL-MCNC: 27 MG/DL (ref 8–23)
BUN/CREAT SERPL: 25.7 (ref 7–25)
CALCIUM SPEC-SCNC: 9.2 MG/DL (ref 8.6–10.5)
CHLORIDE SERPL-SCNC: 106 MMOL/L (ref 98–107)
CLARITY UR: CLEAR
CO2 SERPL-SCNC: 27.2 MMOL/L (ref 22–29)
COD CRY URNS QL: ABNORMAL /HPF
COLOR UR: YELLOW
CREAT SERPL-MCNC: 1.05 MG/DL (ref 0.76–1.27)
DEPRECATED RDW RBC AUTO: 38.4 FL (ref 37–54)
EOSINOPHIL # BLD AUTO: 0.19 10*3/MM3 (ref 0–0.4)
EOSINOPHIL NFR BLD AUTO: 2.9 % (ref 0.3–6.2)
ERYTHROCYTE [DISTWIDTH] IN BLOOD BY AUTOMATED COUNT: 11.4 % (ref 12.3–15.4)
GFR SERPL CREATININE-BSD FRML MDRD: 72 ML/MIN/1.73
GLOBULIN UR ELPH-MCNC: 2 GM/DL
GLUCOSE SERPL-MCNC: 128 MG/DL (ref 65–99)
GLUCOSE UR STRIP-MCNC: NEGATIVE MG/DL
HCT VFR BLD AUTO: 43.3 % (ref 37.5–51)
HGB BLD-MCNC: 14.8 G/DL (ref 13–17.7)
HGB UR QL STRIP.AUTO: NEGATIVE
HYALINE CASTS UR QL AUTO: ABNORMAL /LPF
KETONES UR QL STRIP: NEGATIVE
LEUKOCYTE ESTERASE UR QL STRIP.AUTO: ABNORMAL
LIPASE SERPL-CCNC: 25 U/L (ref 13–60)
LYMPHOCYTES # BLD AUTO: 0.72 10*3/MM3 (ref 0.7–3.1)
LYMPHOCYTES NFR BLD AUTO: 11.1 % (ref 19.6–45.3)
MCH RBC QN AUTO: 31.7 PG (ref 26.6–33)
MCHC RBC AUTO-ENTMCNC: 34.2 G/DL (ref 31.5–35.7)
MCV RBC AUTO: 92.7 FL (ref 79–97)
MONOCYTES # BLD AUTO: 0.36 10*3/MM3 (ref 0.1–0.9)
MONOCYTES NFR BLD AUTO: 5.6 % (ref 5–12)
NEUTROPHILS NFR BLD AUTO: 5.14 10*3/MM3 (ref 1.7–7)
NEUTROPHILS NFR BLD AUTO: 79.3 % (ref 42.7–76)
NITRITE UR QL STRIP: NEGATIVE
PH UR STRIP.AUTO: 6 [PH] (ref 5–8)
PLATELET # BLD AUTO: 119 10*3/MM3 (ref 140–450)
PMV BLD AUTO: 11.5 FL (ref 6–12)
POTASSIUM SERPL-SCNC: 4.5 MMOL/L (ref 3.5–5.2)
PROT SERPL-MCNC: 6.1 G/DL (ref 6–8.5)
PROT UR QL STRIP: NEGATIVE
RBC # BLD AUTO: 4.67 10*6/MM3 (ref 4.14–5.8)
RBC # UR STRIP: ABNORMAL /HPF
REF LAB TEST METHOD: ABNORMAL
SODIUM SERPL-SCNC: 142 MMOL/L (ref 136–145)
SP GR UR STRIP: 1.02 (ref 1–1.03)
SQUAMOUS #/AREA URNS HPF: ABNORMAL /HPF
UROBILINOGEN UR QL STRIP: ABNORMAL
WBC # UR STRIP: ABNORMAL /HPF
WBC NRBC COR # BLD: 6.48 10*3/MM3 (ref 3.4–10.8)

## 2021-12-29 PROCEDURE — 82150 ASSAY OF AMYLASE: CPT

## 2021-12-29 PROCEDURE — 81001 URINALYSIS AUTO W/SCOPE: CPT

## 2021-12-29 PROCEDURE — 99213 OFFICE O/P EST LOW 20 MIN: CPT | Performed by: FAMILY MEDICINE

## 2021-12-29 PROCEDURE — 36415 COLL VENOUS BLD VENIPUNCTURE: CPT

## 2021-12-29 PROCEDURE — 83690 ASSAY OF LIPASE: CPT

## 2021-12-29 PROCEDURE — 80053 COMPREHEN METABOLIC PANEL: CPT

## 2021-12-29 PROCEDURE — 74176 CT ABD & PELVIS W/O CONTRAST: CPT

## 2021-12-29 PROCEDURE — 85025 COMPLETE CBC W/AUTO DIFF WBC: CPT

## 2021-12-29 RX ORDER — TRAMADOL HYDROCHLORIDE 50 MG/1
50 TABLET ORAL EVERY 6 HOURS PRN
Qty: 28 TABLET | Refills: 0 | Status: ON HOLD | OUTPATIENT
Start: 2021-12-29 | End: 2022-01-14 | Stop reason: SDUPTHER

## 2021-12-29 RX ORDER — CIPROFLOXACIN 500 MG/1
500 TABLET, FILM COATED ORAL 2 TIMES DAILY
Qty: 14 TABLET | Refills: 0 | Status: SHIPPED | OUTPATIENT
Start: 2021-12-29 | End: 2022-01-12 | Stop reason: SINTOL

## 2021-12-29 NOTE — PROGRESS NOTES
"Subjective   Robin Wheatley is a 61 y.o. male.     Chief Complaint   Patient presents with   • Abdominal Pain     RUQ started sunday night   • Nausea   • Headache       HPI  Chief complaint: Abdominal pain    Patient is a 61-year-old white male states that he has had left upper quadrant abdominal pain for the past 2 days.  He states it is dull.  He states that it goes.  He states sometimes it is moderately severe.  Other times is just mildly severe.  He complains of abdominal bloating and nausea with this.  Denied fever chills.  Patient had kidney stones in the past.  States it reminds of the pain he gets when he gets a kidney stone.  Patient states that the pain sometimes radiates into the left mid and left lower quadrant.        The following portions of the patient's history were reviewed and updated as appropriate: allergies, current medications, past family history, past medical history, past social history, past surgical history and problem list.    Review of Systems    Objective     /80 (BP Location: Left arm, Patient Position: Sitting, Cuff Size: Adult)   Pulse 59   Temp 96.9 °F (36.1 °C) (Infrared)   Resp 18   Ht 190.5 cm (75\")   Wt 122 kg (268 lb)   SpO2 97%   BMI 33.50 kg/m²     Physical Exam  Vitals and nursing note reviewed.   Constitutional:       Appearance: He is well-developed.   HENT:      Head: Normocephalic and atraumatic.   Eyes:      Pupils: Pupils are equal, round, and reactive to light.   Cardiovascular:      Rate and Rhythm: Normal rate and regular rhythm.      Heart sounds: Normal heart sounds.   Pulmonary:      Effort: Pulmonary effort is normal.      Breath sounds: Normal breath sounds.   Abdominal:      General: Bowel sounds are normal.      Palpations: Abdomen is soft.   Musculoskeletal:         General: Normal range of motion.      Cervical back: Neck supple.   Skin:     General: Skin is warm and dry.   Neurological:      Mental Status: He is oriented to person, place, " and time.   Psychiatric:         Behavior: Behavior normal.         Thought Content: Thought content normal.         Judgment: Judgment normal.           Assessment/Plan   Diagnoses and all orders for this visit:    1. Left upper quadrant abdominal pain (Primary) the patient was advised that his symptoms are suggestive of renal ureteral colic.  I recommended CT scan of the abdomen and laboratory testing.  Further care depend results of studies and how he progresses.  I offered meds for pain.  He declined.  He states they do not work.    -  Patient Instructions   Have the labs and the CT done and call for results.    Further care will depend on the test results and how you progress.      Marlo Michaels Jr., MD    12/29/21

## 2021-12-29 NOTE — PATIENT INSTRUCTIONS
Have the labs and the CT done and call for results.    Further care will depend on the test results and how you progress.

## 2022-01-12 ENCOUNTER — PRE-ADMISSION TESTING (OUTPATIENT)
Dept: PREADMISSION TESTING | Facility: HOSPITAL | Age: 62
End: 2022-01-12

## 2022-01-12 VITALS
RESPIRATION RATE: 16 BRPM | OXYGEN SATURATION: 98 % | TEMPERATURE: 97.9 F | HEIGHT: 75 IN | WEIGHT: 263.6 LBS | SYSTOLIC BLOOD PRESSURE: 105 MMHG | HEART RATE: 61 BPM | BODY MASS INDEX: 32.78 KG/M2 | DIASTOLIC BLOOD PRESSURE: 69 MMHG

## 2022-01-12 LAB
QT INTERVAL: 428 MS
SARS-COV-2 ORF1AB RESP QL NAA+PROBE: NOT DETECTED

## 2022-01-12 PROCEDURE — 93005 ELECTROCARDIOGRAM TRACING: CPT

## 2022-01-12 PROCEDURE — C9803 HOPD COVID-19 SPEC COLLECT: HCPCS

## 2022-01-12 PROCEDURE — U0004 COV-19 TEST NON-CDC HGH THRU: HCPCS

## 2022-01-12 PROCEDURE — 93010 ELECTROCARDIOGRAM REPORT: CPT | Performed by: INTERNAL MEDICINE

## 2022-01-12 RX ORDER — TAMSULOSIN HYDROCHLORIDE 0.4 MG/1
1 CAPSULE ORAL DAILY
COMMUNITY
Start: 2021-12-30 | End: 2022-01-12

## 2022-01-14 ENCOUNTER — HOSPITAL ENCOUNTER (OUTPATIENT)
Facility: HOSPITAL | Age: 62
Setting detail: HOSPITAL OUTPATIENT SURGERY
Discharge: HOME | End: 2022-01-14
Attending: UROLOGY | Admitting: UROLOGY

## 2022-01-14 ENCOUNTER — ANESTHESIA EVENT (OUTPATIENT)
Dept: PERIOP | Facility: HOSPITAL | Age: 62
End: 2022-01-14

## 2022-01-14 ENCOUNTER — ANESTHESIA (OUTPATIENT)
Dept: PERIOP | Facility: HOSPITAL | Age: 62
End: 2022-01-14

## 2022-01-14 ENCOUNTER — APPOINTMENT (OUTPATIENT)
Dept: GENERAL RADIOLOGY | Facility: HOSPITAL | Age: 62
End: 2022-01-14

## 2022-01-14 VITALS
HEIGHT: 76 IN | WEIGHT: 260.9 LBS | TEMPERATURE: 97.7 F | DIASTOLIC BLOOD PRESSURE: 91 MMHG | OXYGEN SATURATION: 99 % | RESPIRATION RATE: 16 BRPM | SYSTOLIC BLOOD PRESSURE: 113 MMHG | HEART RATE: 46 BPM | BODY MASS INDEX: 31.77 KG/M2

## 2022-01-14 DIAGNOSIS — N20.1 URETERAL CALCULUS: Primary | ICD-10-CM

## 2022-01-14 PROCEDURE — 74420 UROGRAPHY RTRGR +-KUB: CPT

## 2022-01-14 PROCEDURE — 25010000002 FENTANYL CITRATE (PF) 50 MCG/ML SOLUTION: Performed by: NURSE ANESTHETIST, CERTIFIED REGISTERED

## 2022-01-14 PROCEDURE — 0 CEFAZOLIN IN DEXTROSE 2-4 GM/100ML-% SOLUTION: Performed by: UROLOGY

## 2022-01-14 PROCEDURE — C1758 CATHETER, URETERAL: HCPCS | Performed by: UROLOGY

## 2022-01-14 PROCEDURE — 25010000002 MIDAZOLAM PER 1 MG: Performed by: ANESTHESIOLOGY

## 2022-01-14 PROCEDURE — 25010000002 DEXAMETHASONE PER 1 MG: Performed by: NURSE ANESTHETIST, CERTIFIED REGISTERED

## 2022-01-14 PROCEDURE — 25010000002 PROPOFOL 10 MG/ML EMULSION: Performed by: NURSE ANESTHETIST, CERTIFIED REGISTERED

## 2022-01-14 PROCEDURE — 25010000002 ONDANSETRON PER 1 MG: Performed by: NURSE ANESTHETIST, CERTIFIED REGISTERED

## 2022-01-14 PROCEDURE — 0 IOTHALAMATE 60 % SOLUTION: Performed by: UROLOGY

## 2022-01-14 RX ORDER — FENTANYL CITRATE 50 UG/ML
50 INJECTION, SOLUTION INTRAMUSCULAR; INTRAVENOUS
Status: DISCONTINUED | OUTPATIENT
Start: 2022-01-14 | End: 2022-01-14 | Stop reason: HOSPADM

## 2022-01-14 RX ORDER — LIDOCAINE HYDROCHLORIDE 20 MG/ML
INJECTION, SOLUTION INFILTRATION; PERINEURAL AS NEEDED
Status: DISCONTINUED | OUTPATIENT
Start: 2022-01-14 | End: 2022-01-14 | Stop reason: SURG

## 2022-01-14 RX ORDER — FLUMAZENIL 0.1 MG/ML
0.2 INJECTION INTRAVENOUS AS NEEDED
Status: DISCONTINUED | OUTPATIENT
Start: 2022-01-14 | End: 2022-01-14 | Stop reason: HOSPADM

## 2022-01-14 RX ORDER — EPHEDRINE SULFATE 50 MG/ML
5 INJECTION, SOLUTION INTRAVENOUS ONCE AS NEEDED
Status: DISCONTINUED | OUTPATIENT
Start: 2022-01-14 | End: 2022-01-14 | Stop reason: HOSPADM

## 2022-01-14 RX ORDER — DIPHENHYDRAMINE HCL 25 MG
25 CAPSULE ORAL
Status: DISCONTINUED | OUTPATIENT
Start: 2022-01-14 | End: 2022-01-14 | Stop reason: HOSPADM

## 2022-01-14 RX ORDER — SODIUM CHLORIDE, SODIUM LACTATE, POTASSIUM CHLORIDE, CALCIUM CHLORIDE 600; 310; 30; 20 MG/100ML; MG/100ML; MG/100ML; MG/100ML
9 INJECTION, SOLUTION INTRAVENOUS CONTINUOUS
Status: DISCONTINUED | OUTPATIENT
Start: 2022-01-14 | End: 2022-01-14 | Stop reason: HOSPADM

## 2022-01-14 RX ORDER — SODIUM CHLORIDE 0.9 % (FLUSH) 0.9 %
3-10 SYRINGE (ML) INJECTION AS NEEDED
Status: DISCONTINUED | OUTPATIENT
Start: 2022-01-14 | End: 2022-01-14 | Stop reason: HOSPADM

## 2022-01-14 RX ORDER — OXYCODONE AND ACETAMINOPHEN 7.5; 325 MG/1; MG/1
1 TABLET ORAL EVERY 4 HOURS PRN
Status: DISCONTINUED | OUTPATIENT
Start: 2022-01-14 | End: 2022-01-14 | Stop reason: HOSPADM

## 2022-01-14 RX ORDER — FAMOTIDINE 10 MG/ML
20 INJECTION, SOLUTION INTRAVENOUS ONCE
Status: COMPLETED | OUTPATIENT
Start: 2022-01-14 | End: 2022-01-14

## 2022-01-14 RX ORDER — PROMETHAZINE HYDROCHLORIDE 25 MG/1
25 SUPPOSITORY RECTAL ONCE AS NEEDED
Status: DISCONTINUED | OUTPATIENT
Start: 2022-01-14 | End: 2022-01-14 | Stop reason: HOSPADM

## 2022-01-14 RX ORDER — DIPHENHYDRAMINE HYDROCHLORIDE 50 MG/ML
12.5 INJECTION INTRAMUSCULAR; INTRAVENOUS
Status: DISCONTINUED | OUTPATIENT
Start: 2022-01-14 | End: 2022-01-14 | Stop reason: HOSPADM

## 2022-01-14 RX ORDER — ONDANSETRON 2 MG/ML
INJECTION INTRAMUSCULAR; INTRAVENOUS AS NEEDED
Status: DISCONTINUED | OUTPATIENT
Start: 2022-01-14 | End: 2022-01-14 | Stop reason: SURG

## 2022-01-14 RX ORDER — SODIUM CHLORIDE 0.9 % (FLUSH) 0.9 %
3 SYRINGE (ML) INJECTION EVERY 12 HOURS SCHEDULED
Status: DISCONTINUED | OUTPATIENT
Start: 2022-01-14 | End: 2022-01-14 | Stop reason: HOSPADM

## 2022-01-14 RX ORDER — NALOXONE HCL 0.4 MG/ML
0.2 VIAL (ML) INJECTION AS NEEDED
Status: DISCONTINUED | OUTPATIENT
Start: 2022-01-14 | End: 2022-01-14 | Stop reason: HOSPADM

## 2022-01-14 RX ORDER — TRAMADOL HYDROCHLORIDE 50 MG/1
50 TABLET ORAL EVERY 6 HOURS PRN
Qty: 20 TABLET | Refills: 0 | Status: SHIPPED | OUTPATIENT
Start: 2022-01-14 | End: 2022-08-25 | Stop reason: HOSPADM

## 2022-01-14 RX ORDER — DEXAMETHASONE SODIUM PHOSPHATE 10 MG/ML
INJECTION INTRAMUSCULAR; INTRAVENOUS AS NEEDED
Status: DISCONTINUED | OUTPATIENT
Start: 2022-01-14 | End: 2022-01-14 | Stop reason: SURG

## 2022-01-14 RX ORDER — HYDROCODONE BITARTRATE AND ACETAMINOPHEN 7.5; 325 MG/1; MG/1
1 TABLET ORAL ONCE AS NEEDED
Status: DISCONTINUED | OUTPATIENT
Start: 2022-01-14 | End: 2022-01-14 | Stop reason: HOSPADM

## 2022-01-14 RX ORDER — LABETALOL HYDROCHLORIDE 5 MG/ML
5 INJECTION, SOLUTION INTRAVENOUS
Status: DISCONTINUED | OUTPATIENT
Start: 2022-01-14 | End: 2022-01-14 | Stop reason: HOSPADM

## 2022-01-14 RX ORDER — PROMETHAZINE HYDROCHLORIDE 25 MG/1
25 TABLET ORAL ONCE AS NEEDED
Status: DISCONTINUED | OUTPATIENT
Start: 2022-01-14 | End: 2022-01-14 | Stop reason: HOSPADM

## 2022-01-14 RX ORDER — MIDAZOLAM HYDROCHLORIDE 1 MG/ML
1 INJECTION INTRAMUSCULAR; INTRAVENOUS
Status: DISCONTINUED | OUTPATIENT
Start: 2022-01-14 | End: 2022-01-14 | Stop reason: HOSPADM

## 2022-01-14 RX ORDER — PROPOFOL 10 MG/ML
VIAL (ML) INTRAVENOUS AS NEEDED
Status: DISCONTINUED | OUTPATIENT
Start: 2022-01-14 | End: 2022-01-14 | Stop reason: SURG

## 2022-01-14 RX ORDER — MAGNESIUM HYDROXIDE 1200 MG/15ML
LIQUID ORAL AS NEEDED
Status: DISCONTINUED | OUTPATIENT
Start: 2022-01-14 | End: 2022-01-14 | Stop reason: HOSPADM

## 2022-01-14 RX ORDER — TADALAFIL 20 MG/1
20 TABLET, FILM COATED ORAL DAILY PRN
COMMUNITY
End: 2022-08-11

## 2022-01-14 RX ORDER — CEFAZOLIN SODIUM 2 G/100ML
2 INJECTION, SOLUTION INTRAVENOUS ONCE
Status: COMPLETED | OUTPATIENT
Start: 2022-01-14 | End: 2022-01-14

## 2022-01-14 RX ORDER — LIDOCAINE HYDROCHLORIDE 10 MG/ML
0.5 INJECTION, SOLUTION EPIDURAL; INFILTRATION; INTRACAUDAL; PERINEURAL ONCE AS NEEDED
Status: DISCONTINUED | OUTPATIENT
Start: 2022-01-14 | End: 2022-01-14 | Stop reason: HOSPADM

## 2022-01-14 RX ORDER — HYDROMORPHONE HYDROCHLORIDE 1 MG/ML
0.5 INJECTION, SOLUTION INTRAMUSCULAR; INTRAVENOUS; SUBCUTANEOUS
Status: DISCONTINUED | OUTPATIENT
Start: 2022-01-14 | End: 2022-01-14 | Stop reason: HOSPADM

## 2022-01-14 RX ORDER — ONDANSETRON 2 MG/ML
4 INJECTION INTRAMUSCULAR; INTRAVENOUS ONCE AS NEEDED
Status: DISCONTINUED | OUTPATIENT
Start: 2022-01-14 | End: 2022-01-14 | Stop reason: HOSPADM

## 2022-01-14 RX ORDER — CEPHALEXIN 500 MG/1
500 CAPSULE ORAL 3 TIMES DAILY
Qty: 21 CAPSULE | Refills: 0 | Status: SHIPPED | OUTPATIENT
Start: 2022-01-14 | End: 2022-01-21

## 2022-01-14 RX ORDER — HYDRALAZINE HYDROCHLORIDE 20 MG/ML
5 INJECTION INTRAMUSCULAR; INTRAVENOUS
Status: DISCONTINUED | OUTPATIENT
Start: 2022-01-14 | End: 2022-01-14 | Stop reason: HOSPADM

## 2022-01-14 RX ORDER — FENTANYL CITRATE 50 UG/ML
INJECTION, SOLUTION INTRAMUSCULAR; INTRAVENOUS AS NEEDED
Status: DISCONTINUED | OUTPATIENT
Start: 2022-01-14 | End: 2022-01-14 | Stop reason: SURG

## 2022-01-14 RX ORDER — IBUPROFEN 600 MG/1
600 TABLET ORAL ONCE AS NEEDED
Status: DISCONTINUED | OUTPATIENT
Start: 2022-01-14 | End: 2022-01-14 | Stop reason: HOSPADM

## 2022-01-14 RX ADMIN — FENTANYL CITRATE 50 MCG: 0.05 INJECTION, SOLUTION INTRAMUSCULAR; INTRAVENOUS at 13:57

## 2022-01-14 RX ADMIN — PROPOFOL 200 MG: 10 INJECTION, EMULSION INTRAVENOUS at 13:41

## 2022-01-14 RX ADMIN — SODIUM CHLORIDE, POTASSIUM CHLORIDE, SODIUM LACTATE AND CALCIUM CHLORIDE 9 ML/HR: 600; 310; 30; 20 INJECTION, SOLUTION INTRAVENOUS at 12:36

## 2022-01-14 RX ADMIN — FENTANYL CITRATE 50 MCG: 0.05 INJECTION, SOLUTION INTRAMUSCULAR; INTRAVENOUS at 13:41

## 2022-01-14 RX ADMIN — LIDOCAINE HYDROCHLORIDE 80 MG: 20 INJECTION, SOLUTION INFILTRATION; PERINEURAL at 13:41

## 2022-01-14 RX ADMIN — DEXAMETHASONE SODIUM PHOSPHATE 8 MG: 10 INJECTION INTRAMUSCULAR; INTRAVENOUS at 13:45

## 2022-01-14 RX ADMIN — ONDANSETRON 4 MG: 2 INJECTION INTRAMUSCULAR; INTRAVENOUS at 13:45

## 2022-01-14 RX ADMIN — MIDAZOLAM 1 MG: 1 INJECTION INTRAMUSCULAR; INTRAVENOUS at 13:03

## 2022-01-14 RX ADMIN — CEFAZOLIN SODIUM 2 G: 2 INJECTION, SOLUTION INTRAVENOUS at 13:33

## 2022-01-14 RX ADMIN — FAMOTIDINE 20 MG: 10 INJECTION INTRAVENOUS at 13:03

## 2022-01-14 NOTE — H&P
[facility] and Physical    Patient Care Team:  Marlo Michaels Jr., MD as PCP - General (Family Medicine)    Chief complaint left flank pain    Subjective     Patient is a 61 y.o. male presents with left ureteral stone for ureteroscopy.     Review of Systems   Pertinent items are noted in HPI    Past Medical History:   Diagnosis Date   • Allergic    • DJD of shoulder 2021   • History of chronic sinusitis    • Hx of difficult intubation     X1   • Hypertension    • Kidney stone    • Obesity (BMI 30-39.9)    • Osteoarthritis of left glenohumeral joint 2021    Added automatically from request for surgery [id number]   • Sleep apnea     has CPAP   • Spinal stenosis    • Testicular hypofunction    • Thrombocytopenia, chronic    • Vertigo      Past Surgical History:   Procedure Laterality Date   • CATARACT EXTRACTION Right    • COLON SURGERY     • COLONOSCOPY     • KNEE ARTHROSCOPY Bilateral     X3   • QUADRICEPS TENDON REPAIR     • RETINAL DETACHMENT REPAIR Right    • SEPTOPLASTY     • SHOULDER SURGERY Bilateral    • SINUS SURGERY     • TOTAL SHOULDER ARTHROPLASTY W/ DISTAL CLAVICLE EXCISION Left 2021    Procedure: TOTAL SHOULDER REVERSE ARTHROPLASTY;  Surgeon: Carl Hairston MD;  Location: [facility] MAIN OR;  Service: Orthopedics;  Laterality: Left;     Family History   Problem Relation Age of Onset   • Cancer Mother    • [place] Hyperthermia Neg Hx      Social History     Tobacco Use   • Smoking status: Former Smoker     Packs/day: 0.50     Years: 30.00     Pack years: 15.00     Quit date: 2015     Years since quittin.0   • Smokeless tobacco: Never Used   Vaping Use   • Vaping Use: Never used   Substance Use Topics   • Alcohol use: Yes     Comment: BEER OCCASIONALLY   • Drug use: Never       Meds:  Medications Prior to Admission   Medication Sig Dispense Refill Last Dose   • ALLERGY SERUM INJECTION Inject 0.16 mL under the skin into the appropriate area as directed 1  "(One) Time.   1/8/2022   • [place] Thyroid 60 MG tablet Take 1 tablet by mouth Daily. Take DOS (Patient taking differently: Take 60 mg by mouth Every Morning. \"ANTI-AGING\"/DOES NOT HAVE HYPOTHYROIDISM) 90 tablet 1 1/14/2022 at 0900   • cetirizine (zyrTEC) 10 MG tablet Take 10 mg by mouth Every Morning.   1/13/2022 at 0900   • Magnesium-Potassium 250-100 MG tablet Take 1 tablet by mouth Daily. PT HOLDING FOR SURGERY   Past Month at Unknown time   • olmesartan (BENICAR) 20 MG tablet Take 0.5 tablets by mouth Every Morning. LD 1/17 (Patient taking differently: Take 10 mg by mouth Every Morning.) 90 tablet 0 1/13/2022 at 0900   • traMADol (ULTRAM) 50 MG tablet Take 1 tablet by mouth Every 6 (Six) Hours As Needed for Moderate Pain . 28 tablet 0 Past Week at Unknown time   • DHEA 25 MG capsule Take 1 capsule by mouth Daily. PT HOLDING FOR SURGERY   1/10/2022   • Docosahexaenoic Acid (DHA OMEGA 3 PO) Take 1 tablet by mouth Daily. PT HOLDING FOR SURGERY   1/10/2022   • fluticasone (FLONASE) 50 MCG/ACT nasal spray 2 sprays into the nostril(s) as directed by provider As Needed.   More than a month at Unknown time   • Glucosamine-Chondroit-Vit C-Mn (Glucosamine 1500 Complex) capsule Take 2 capsules by mouth 2 (two) times a day. PT HOLDING FOR SURGERY      • Hyaluronic Acid-Vitamin C (HYALURONIC ACID PO) Take 1 tablet by mouth Daily. PT HOLDING FOR SURGERY   1/10/2022   • meloxicam (MOBIC) 15 MG tablet TAKE 1 TABLET DAILY (Patient taking differently: Take 15 mg by mouth Daily. PT HOLDING FOR SURGERY) 90 tablet 2 1/10/2022   • multivitamin with minerals tablet tablet Take 1 tablet by mouth Daily. PT HOLDING FOR SURGERY   1/10/2022   • pentoxifylline (TRENtal) 400 MG CR tablet Take 1 tablet by mouth Every 12 (Twelve) Hours. (Patient taking differently: Take 400 mg by mouth Every 12 (Twelve) Hours. PT HOLDING FOR SURGERY  (FOR INNER EAR)) 180 tablet 0 1/7/2022   • Phenazopyridine HCl (AZO TABS PO) Take 1 tablet by mouth Every " "Morning.   1/12/2022   • Syringe/Needle, Disp, (BD ECLIPSE SYRINGE) 21G X 1\" 3 ML misc BD ECLIPSE SYRINGE 21G X 1\" 3 ML      • tadalafil 20 MG tablet tablet Take 20 mg by mouth Daily As Needed.   More than a month at Unknown time   • vitamin D3 125 MCG (5000 UT) capsule capsule Take 5,000 Units by mouth Daily. PT HOLDING FOR SURGERY   1/10/2022   • Vitamins-Lipotropics ([employer] Inner Ear Plus) tablet Take 1 tablet by mouth 2 (Two) Times a Day. PT HOLDING FOR SURGERY   1/7/2022       Allergies:  Ciprofloxacin, Tamsulosin, and Levofloxacin    Debilities:  none    Objective     Vital Signs  Temp:  [98.1 °F (36.7 °C)] 98.1 °F (36.7 °C)  Heart Rate:  [50-57] 50  Resp:  [18] 18  BP: (112)/(78) 112/78  No intake or output data in the 24 hours ending 01/14/22 1334  Flowsheet Rows      First Filed Value   Admission Height 191.8 cm (75.5\") Documented at 01/14/2022 1205   Admission Weight 118 kg (260 lb 14.4 oz) Documented at 01/14/2022 1205           Physical Exam:     General Appearance:    Alert, cooperative, NAD   HEENT:    No trauma, pupils reactive, hearing intact   Back:     No CVA tenderness   Lungs:     Respirations unlabored, no wheezing    Heart:    RRR, intact peripheral pulses   Abdomen:     Soft, NDNT, no masses, no guarding   :    Phallus nl   Extremities:   No edema, no deformity   Lymphatic:   No neck or groin LAD   Skin:   No bleeding, bruising or rashes   Neuro/Psych:   Orientation intact, mood/affect pleasant, no focal findings     Results Review:    I reviewed the patient's new clinical results.  Results for orders placed or performed in visit on 01/12/22   COVID-19,APTIMA PANTHER(ZULY),[facility], NP/OP SWAB IN UTM/VTM/SALINE TRANSPORT MEDIA,24 HR TAT - Swab, Nasopharynx    Specimen: Nasopharynx; Swab   Result Value Ref Range    COVID19 Not Detected Not Detected - Ref. Range   ECG 12 Lead   Result Value Ref Range    QT Interval 428 ms        Assessment:  Left Ureteral stone    Plan:    Left Ureteroscopy " laserlitho stent    I discussed the patient's findings and my recommendations with patient.   Risks, complications, outcomes and alternatives discussed with the patient at the bedside and office.    Aung Bolton MD  01/14/22  13:34 EST

## 2022-01-14 NOTE — OP NOTE
URETEROSCOPY LASER LITHOTRIPSY WITH STENT INSERTION  Procedure Note    Robin Wheatley  1/14/2022    Pre-op Diagnosis:   Left Ureteral stone    Post-op Diagnosis:     Post-Op Diagnosis Codes:     * Ureteral calculus [N20.1]    Procedure(s):  CYSTOSCOPY, LEFT URETEROSCOPY, BILATERAL RETROGRADE PYELOGRAM    Surgeon(s):  Aung Bolton MD    Anesthesia: General    Staff:   Circulator: Spurling, Shannon, RN  Radiology Technologist: Tona Mcbride  Scrub Person: Brie Pickens    Estimated Blood Loss: none    Specimens:                * No orders in the log *      Drains: * No LDAs found *    Findings: No demonstrable stone seen    Complications: None    Indications: 61-year-old gentleman left distal ureter stone right at the ureterovesical junction with persistent pain now presents for left ureteroscopy.    Procedure: Patient was taken the op suite given general anesthesia.  Placed in a comfortable supine in lithotomy position.  Prepped and draped in a sterile fashion.  Surgical timeout was performed.  Cystoscope was inserted.  Urethra is normal.  The bladder was normal.  The right orifice was cannulated with a Pollack catheter and a retrograde pyelogram was performed.  The right retrograde pyelogram showed a normal caliber ureter normal pelvis normal calyces.  No filling defects noted.  The left orifice was then cannulated and the retrograde pyelogram showed a caliber ureter minimally dilated distally with no filling defects.  Proximal and mid segments are normal pelvis was normal.  I placed a guidewire up.  The rigid ureteroscope was inserted into the bladder the orifice was cannulated I passed this all the way up to the proximal ureter and I did not encounter any stones.  Not dilate the ureter.  The ureteroscope was passed up fairly easily so I elected not to place a stent.  Bladder was drained and he was awoken and taken to recovery in condition.      Aung Bolton MD     Date: 1/14/2022  Time:  14:29 EST

## 2022-01-14 NOTE — ANESTHESIA PROCEDURE NOTES
Airway  Urgency: elective    Date/Time: 1/14/2022 1:42 PM  Airway not difficult    General Information and Staff    Patient location during procedure: OR  CRNA: Kathy Mi CRNA    Indications and Patient Condition  Indications for airway management: airway protection    Preoxygenated: yes  Mask difficulty assessment: 0 - not attempted    Final Airway Details  Final airway type: supraglottic airway      Successful airway: classic  Size 5    Number of attempts at approach: 1  Assessment: lips, teeth, and gum same as pre-op and atraumatic intubation    Additional Comments  LMA inserted without difficulty.  Lips and teeth intact as preop condition.  No signs or symptoms of gastric regurgitation.  Seal with minimal pressure and secure.

## 2022-01-14 NOTE — ANESTHESIA PREPROCEDURE EVALUATION
Anesthesia Evaluation     Patient summary reviewed and Nursing notes reviewed   history of anesthetic complications: difficult airway               Airway   Mallampati: III  TM distance: >3 FB  Neck ROM: full  Possible difficult intubation  Dental - normal exam     Pulmonary - normal exam   (+) a smoker Former, sleep apnea on CPAP,   Cardiovascular - normal exam    (+) hypertension less than 2 medications,       Neuro/Psych  (+) dizziness/light headedness,       ROS Comment: Vertigo/deafness  GI/Hepatic/Renal/Endo    (+) obesity,   renal disease stones, thyroid problem hypothyroidism    Musculoskeletal         ROS comment: DJD left shoulder  Abdominal   (+) obese,    Substance History      OB/GYN          Other   arthritis, blood dyscrasia thrombocytopenia,                     Anesthesia Plan    ASA 3     general   (Probable LMA)  intravenous induction     Anesthetic plan, all risks, benefits, and alternatives have been provided, discussed and informed consent has been obtained with: patient.    Plan discussed with CRNA.

## 2022-01-14 NOTE — ANESTHESIA POSTPROCEDURE EVALUATION
"Patient: Robin Wheatley    Procedure Summary     Date: 01/14/22 Room / Location: [facility] OR 01 / [facility] MAIN OR    Anesthesia Start: 1336 Anesthesia Stop: 1411    Procedure: CYSTOSCOPY, LEFT URETEROSCOPY, BILATERAL RETROGRADE PYELOGRAM (Left ) Diagnosis:       Ureteral calculus      (Left Ureteral stone)    Surgeons: Aung Bolton MD Provider: Erik Mcconnell MD    Anesthesia Type: general ASA Status: 3          Anesthesia Type: general    Vitals  Vitals Value Taken Time   /86 01/14/22 1431   Temp 36.5 °C (97.7 °F) 01/14/22 1410   Pulse 52 01/14/22 1431   Resp 14 01/14/22 1430   SpO2 97 % 01/14/22 1431   Vitals shown include unvalidated device data.        Post Anesthesia Care and Evaluation    Patient location during evaluation: bedside  Patient participation: complete - patient participated  Level of consciousness: awake and alert  Pain management: adequate  Airway patency: patent  Anesthetic complications: No anesthetic complications  PONV Status: none  Cardiovascular status: acceptable and hemodynamically stable  Respiratory status: acceptable and spontaneous ventilation  Hydration status: acceptable    Comments: /91 (BP Location: Right arm, Patient Position: Lying)   Pulse (!) 46   Temp 36.5 °C (97.7 °F) (Oral)   Resp 16   Ht 191.8 cm (75.5\")   Wt 118 kg (260 lb 14.4 oz)   SpO2 99%   BMI 32.18 kg/m²         "

## 2022-02-10 ENCOUNTER — OFFICE VISIT (OUTPATIENT)
Dept: FAMILY MEDICINE CLINIC | Facility: CLINIC | Age: 62
End: 2022-02-10

## 2022-02-10 VITALS
DIASTOLIC BLOOD PRESSURE: 70 MMHG | HEIGHT: 76 IN | HEART RATE: 63 BPM | BODY MASS INDEX: 32.51 KG/M2 | OXYGEN SATURATION: 97 % | WEIGHT: 267 LBS | TEMPERATURE: 97.1 F | SYSTOLIC BLOOD PRESSURE: 109 MMHG

## 2022-02-10 DIAGNOSIS — J30.9 ALLERGIC RHINITIS, UNSPECIFIED SEASONALITY, UNSPECIFIED TRIGGER: Chronic | ICD-10-CM

## 2022-02-10 DIAGNOSIS — R42 VERTIGO: Chronic | ICD-10-CM

## 2022-02-10 DIAGNOSIS — G47.30 SLEEP APNEA, UNSPECIFIED TYPE: Chronic | ICD-10-CM

## 2022-02-10 DIAGNOSIS — E29.1 TESTICULAR HYPOFUNCTION: Chronic | ICD-10-CM

## 2022-02-10 DIAGNOSIS — I10 BENIGN ESSENTIAL HYPERTENSION: Primary | Chronic | ICD-10-CM

## 2022-02-10 DIAGNOSIS — E03.9 ACQUIRED HYPOTHYROIDISM: Chronic | ICD-10-CM

## 2022-02-10 PROCEDURE — 99214 OFFICE O/P EST MOD 30 MIN: CPT | Performed by: FAMILY MEDICINE

## 2022-02-10 NOTE — PROGRESS NOTES
"Subjective   Robin Wheatley is a 61 y.o. male.     Chief Complaint   Patient presents with   • Hypertension     6 month f/u   • Hypothyroidism       HPI  Chief complaint: Hypertension hypothyroidism allergic rhinitis testicular hypofunction vertigo    Patient is a 61-year-old white male comes in for follow-up and maintenance of his current problems which include    1.  Hypertension-stable-the patient is currently on olmesartan 10 mg once a day.  He denied headache lightheadedness dizziness or chest pain.    2.  Allergic rhinitis-stable-patient is current on Zyrtec 10 mg daily and Flonase 2 sprays each nostril once a day.  He denies sinus congestion drainage cough shortness of breath or wheezing.    3.  Vertigo-stable-patient is currently on Trental 400 mg every 12 hours.  He states it helps to control his symptoms.    4.  Hypothyroidism-stable-patient is currently on [place] Thyroid 60 mg daily.  He denied heat or cold intolerance tremor weight gain or weight loss.    5.  Sleep apnea-stable patient is currently on CPAP.          The following portions of the patient's history were reviewed and updated as appropriate: allergies, current medications, past family history, past medical history, past social history, past surgical history and problem list.    Review of Systems    Objective     /70 (BP Location: Left arm, Patient Position: Sitting, Cuff Size: Adult)   Pulse 63   Temp 97.1 °F (36.2 °C) (Infrared)   Ht 191.8 cm (75.5\")   Wt 121 kg (267 lb)   SpO2 97%   BMI 32.93 kg/m²     Physical Exam  Vitals and nursing note reviewed.   Constitutional:       Appearance: He is well-developed.   HENT:      Head: Normocephalic and atraumatic.      Nose: Nose normal.      Mouth/Throat:      Mouth: Mucous membranes are moist.      Pharynx: Oropharynx is clear.   Eyes:      Extraocular Movements: Extraocular movements intact.      Conjunctiva/sclera: Conjunctivae normal.      Pupils: Pupils are equal, round, and " reactive to light.   Cardiovascular:      Rate and Rhythm: Normal rate and regular rhythm.      Pulses: Normal pulses.      Heart sounds: Normal heart sounds. No murmur heard.  No friction rub. No gallop.    Pulmonary:      Effort: Pulmonary effort is normal.      Breath sounds: Normal breath sounds.   Abdominal:      General: Abdomen is flat. Bowel sounds are normal.      Palpations: Abdomen is soft.   Musculoskeletal:         General: Normal range of motion.      Cervical back: Normal range of motion and neck supple.   Skin:     General: Skin is warm and dry.   Neurological:      Mental Status: He is alert and oriented to person, place, and time.   Psychiatric:         Behavior: Behavior normal.         Thought Content: Thought content normal.         Judgment: Judgment normal.       Urinalysis, Microscopic Only - Urine, Random Void (12/29/2021 13:03)  Urinalysis With / Culture If Indicated - Urine, Random Void (12/29/2021 13:03)  Lipase (12/29/2021 12:02)  Amylase (12/29/2021 12:02)  Comprehensive Metabolic Panel (12/29/2021 12:02)  CBC & Differential (12/29/2021 12:02)  CT Abdomen Pelvis Without Contrast (12/29/2021 11:45)      Assessment/Plan   Diagnoses and all orders for this visit:    1. Benign essential hypertension (Primary)  -     Cancel: CBC & Differential; Future  -     Cancel: Comprehensive Metabolic Panel; Future  -     Cancel: Lipid Panel; Future  -     CBC & Differential; Future  -     Comprehensive Metabolic Panel; Future  -     Lipid Panel; Future    2. Allergic rhinitis, unspecified seasonality, unspecified trigger    3. Acquired hypothyroidism    4. Sleep apnea, unspecified type    5. Testicular hypofunction  -     Cancel: TSH; Future  -     TSH; Future    6. Vertigo      Patient Instructions   Continue your current medications and treatment.    Follow up in the office in 6 months.    Laboratory testing at that time.          Marlo Michaels Jr., MD    02/10/22

## 2022-03-22 ENCOUNTER — TELEPHONE (OUTPATIENT)
Dept: FAMILY MEDICINE CLINIC | Facility: CLINIC | Age: 62
End: 2022-03-22

## 2022-03-22 RX ORDER — THYROID 60 MG
60 TABLET ORAL DAILY
Qty: 90 TABLET | Refills: 1 | Status: SHIPPED | OUTPATIENT
Start: 2022-03-22 | End: 2022-09-21 | Stop reason: SDUPTHER

## 2022-03-22 RX ORDER — THYROID 60 MG
60 TABLET ORAL DAILY
Qty: 90 TABLET | Refills: 1 | Status: CANCELLED | OUTPATIENT
Start: 2022-03-22

## 2022-03-22 NOTE — TELEPHONE ENCOUNTER
Caller: Robin Wheatley    Relationship: Self    Best call back number: [phone]    Requested Prescriptions:   Requested Prescriptions     Pending Prescriptions Disp Refills   • [place] Thyroid 60 MG tablet 90 tablet 1     Sig: Take 1 tablet by mouth Daily. Take [facility]        Pharmacy where request should be sent: [facility] - [place] [address] - [phone]  - [phone] FX     Additional details provided by patient:     Does the patient have less than a 3 day supply:  [] Yes  [x] No    Eliecer Branch Rep   03/22/22 10:56 EDT

## 2022-04-14 RX ORDER — PENTOXIFYLLINE 400 MG/1
TABLET, EXTENDED RELEASE ORAL
Qty: 180 TABLET | Refills: 0 | Status: SHIPPED | OUTPATIENT
Start: 2022-04-14 | End: 2022-06-27 | Stop reason: SDUPTHER

## 2022-05-16 ENCOUNTER — TELEPHONE (OUTPATIENT)
Dept: FAMILY MEDICINE CLINIC | Facility: CLINIC | Age: 62
End: 2022-05-16

## 2022-05-16 NOTE — TELEPHONE ENCOUNTER
Caller: SAMANTHA MANRIQUEA    Relationship: Emergency Contact    Best call back number: [phone]    What medication are you requesting: MEDICATION FOR COVID, PATIENT'S WIFE CANNOT REMEMBER THE NAME.    What are your current symptoms: CONGESTION, ACHY JOINTS, LOW GRADE FEVER, AND COUGH    How long have you been experiencing symptoms: 5/14/22    Have you had these symptoms before:    [] Yes  [x] No    Have you been treated for these symptoms before:   [] Yes  [x] No    If a prescription is needed, what is your preferred pharmacy and phone number: [facility][id number] - [place] [address] [address] [address] AT [facility] OF  & [facility] - [phone]  - [phone] FX     Additional notes: PATIENT'S WIFE STATES THE PATIENT HAS TESTED POSITIVE FOR COVID THIS MORNING AND REQUESTING THE MEDICATION THAT IS TO TREAT COVID. PATIENT'S WIFE ALSO WANTS TO KNOW ANY RECOMMENDATIONS REGARDING EXPOSURE FROM THE PATIENT.

## 2022-05-16 NOTE — TELEPHONE ENCOUNTER
I spoke with the patient's wife.  He/she requested Irvemectin.  They were advised that I do not prescribe it Irvemectin.  They were advised that the treatment is symptomatic.

## 2022-06-27 ENCOUNTER — TELEPHONE (OUTPATIENT)
Dept: FAMILY MEDICINE CLINIC | Facility: CLINIC | Age: 62
End: 2022-06-27

## 2022-06-27 RX ORDER — OLMESARTAN MEDOXOMIL 20 MG/1
10 TABLET ORAL EVERY MORNING
Qty: 90 TABLET | Refills: 0 | Status: SHIPPED | OUTPATIENT
Start: 2022-06-27 | End: 2022-06-30 | Stop reason: SDUPTHER

## 2022-06-27 RX ORDER — MELOXICAM 15 MG/1
15 TABLET ORAL DAILY
Qty: 90 TABLET | Refills: 2 | Status: SHIPPED | OUTPATIENT
Start: 2022-06-27 | End: 2022-07-01 | Stop reason: SDUPTHER

## 2022-06-27 RX ORDER — PENTOXIFYLLINE 400 MG/1
400 TABLET, EXTENDED RELEASE ORAL EVERY 12 HOURS
Qty: 180 TABLET | Refills: 0 | Status: SHIPPED | OUTPATIENT
Start: 2022-06-27 | End: 2022-06-30 | Stop reason: SDUPTHER

## 2022-06-27 NOTE — TELEPHONE ENCOUNTER
Caller: Robin Wheatley    Relationship: Self    Best call back number: [phone] (H)    Requested Prescriptions:   olmesartan (BENICAR) 20 MG tablet     meloxicam (MOBIC) 15 MG tablet    pentoxifylline (TRENtal) 400 MG CR tablet    Pharmacy where request should be sent:  "[employer] [employer] [facility] - [address] - [phone]  - [phone] FX    tadalafil 20 MG tablet tablet    [facility] #[id number] - [place] [address] AT [facility] OF  &  - [phone] PH - [phone] FX        Additional details provided by patient: PATIENT CALLED TO REQUEST A MEDICATION REFILL ON HIS MEDICATION WITH A 90 DAY SUPPLY. PATIENT STATES THAT HE HAS A 3 DAY SUPPLY LEFT.            Does the patient have less than a 3 day supply:  [] Yes  [x] No    Eliecer Goldstein Rep   06/27/22 11:55 EDT         THANKS

## 2022-06-28 NOTE — TELEPHONE ENCOUNTER
PATIENT IS CALLING IN HE STATES THAT THE THREE MEDICATIONS THAT WERE SENT YESTERDAY WERE TO GO TO HIS MAIL ORDER PHARMACY NOT THE LOCAL.     THIS WAS FOR THE:      OLMESARTAN  MELOXICAM   PENTOXIFYLLINE    ALL SHOULD GO TO [facility]  [facility] - [place] [place] [facility] - [phone]  - [phone] FX       AND THE TADLAFIL IS THE ONE THAT NEEDS TO GO TO LOCAL PHARMACY    [id number] [place]  [address] [address] [address] OF  &  - [phone]  - [phone] FX     CAN YOU PLEASE RESEND

## 2022-06-30 ENCOUNTER — DOCUMENTATION (OUTPATIENT)
Dept: FAMILY MEDICINE CLINIC | Facility: CLINIC | Age: 62
End: 2022-06-30

## 2022-06-30 RX ORDER — OLMESARTAN MEDOXOMIL 20 MG/1
10 TABLET ORAL EVERY MORNING
Qty: 90 TABLET | Refills: 0 | Status: SHIPPED | OUTPATIENT
Start: 2022-06-30 | End: 2022-07-01 | Stop reason: SDUPTHER

## 2022-06-30 RX ORDER — PENTOXIFYLLINE 400 MG/1
400 TABLET, EXTENDED RELEASE ORAL EVERY 12 HOURS
Qty: 180 TABLET | Refills: 0 | Status: SHIPPED | OUTPATIENT
Start: 2022-06-30

## 2022-07-01 RX ORDER — MELOXICAM 15 MG/1
15 TABLET ORAL DAILY
Qty: 90 TABLET | Refills: 2 | Status: SHIPPED | OUTPATIENT
Start: 2022-07-01 | End: 2022-07-12 | Stop reason: SDUPTHER

## 2022-07-01 RX ORDER — OLMESARTAN MEDOXOMIL 20 MG/1
10 TABLET ORAL EVERY MORNING
Qty: 90 TABLET | Refills: 0 | Status: SHIPPED | OUTPATIENT
Start: 2022-07-01 | End: 2022-07-12 | Stop reason: SDUPTHER

## 2022-07-12 ENCOUNTER — TELEPHONE (OUTPATIENT)
Dept: FAMILY MEDICINE CLINIC | Facility: CLINIC | Age: 62
End: 2022-07-12

## 2022-07-12 RX ORDER — TADALAFIL 20 MG/1
TABLET ORAL
Qty: 9 TABLET | Refills: 3 | Status: SHIPPED | OUTPATIENT
Start: 2022-07-12 | End: 2022-08-11

## 2022-07-12 RX ORDER — MELOXICAM 15 MG/1
15 TABLET ORAL DAILY
Qty: 90 TABLET | Refills: 0 | Status: SHIPPED | OUTPATIENT
Start: 2022-07-12 | End: 2023-02-23 | Stop reason: SDUPTHER

## 2022-07-12 RX ORDER — OLMESARTAN MEDOXOMIL 20 MG/1
10 TABLET ORAL EVERY MORNING
Qty: 90 TABLET | Refills: 0 | Status: SHIPPED | OUTPATIENT
Start: 2022-07-12 | End: 2023-04-03

## 2022-07-12 NOTE — TELEPHONE ENCOUNTER
Requesting meloxicam and olmesartan to be sent back to [facility] pharmacy- pharmacy did not receive    Requesting rx for cialis and diclofenac gel to be sent to [facility]

## 2022-08-01 ENCOUNTER — OFFICE VISIT (OUTPATIENT)
Dept: ORTHOPEDIC SURGERY | Facility: CLINIC | Age: 62
End: 2022-08-01

## 2022-08-01 ENCOUNTER — PREP FOR SURGERY (OUTPATIENT)
Dept: OTHER | Facility: HOSPITAL | Age: 62
End: 2022-08-01

## 2022-08-01 VITALS — BODY MASS INDEX: 33.07 KG/M2 | WEIGHT: 271.6 LBS | HEIGHT: 76 IN | HEART RATE: 48 BPM

## 2022-08-01 DIAGNOSIS — M75.121 COMPLETE TEAR OF RIGHT ROTATOR CUFF, UNSPECIFIED WHETHER TRAUMATIC: ICD-10-CM

## 2022-08-01 DIAGNOSIS — M19.011 OSTEOARTHRITIS OF RIGHT GLENOHUMERAL JOINT: ICD-10-CM

## 2022-08-01 DIAGNOSIS — M19.011 OSTEOARTHRITIS OF RIGHT GLENOHUMERAL JOINT: Primary | ICD-10-CM

## 2022-08-01 DIAGNOSIS — M25.511 ACUTE PAIN OF RIGHT SHOULDER: Primary | ICD-10-CM

## 2022-08-01 PROCEDURE — 99214 OFFICE O/P EST MOD 30 MIN: CPT | Performed by: ORTHOPAEDIC SURGERY

## 2022-08-01 PROCEDURE — 97760 ORTHOTIC MGMT&TRAING 1ST ENC: CPT | Performed by: ORTHOPAEDIC SURGERY

## 2022-08-01 RX ORDER — TRANEXAMIC ACID 10 MG/ML
1000 INJECTION, SOLUTION INTRAVENOUS ONCE
Status: CANCELLED | OUTPATIENT
Start: 2022-08-01 | End: 2022-08-01

## 2022-08-01 RX ORDER — PREGABALIN 75 MG/1
150 CAPSULE ORAL ONCE
Status: CANCELLED | OUTPATIENT
Start: 2022-08-01 | End: 2022-08-01

## 2022-08-01 RX ORDER — MELOXICAM 15 MG/1
15 TABLET ORAL ONCE
Status: CANCELLED | OUTPATIENT
Start: 2022-08-01 | End: 2022-08-01

## 2022-08-01 RX ORDER — OXYCODONE HCL 10 MG/1
10 TABLET, FILM COATED, EXTENDED RELEASE ORAL ONCE
Status: CANCELLED | OUTPATIENT
Start: 2022-08-01 | End: 2022-08-01

## 2022-08-01 NOTE — H&P (VIEW-ONLY)
Patient ID: Robin Wheatley is a 61 y.o. male.    Chief Complaint:    Chief Complaint   Patient presents with   • Right Shoulder - Initial Evaluation, Pain     Pain 6-8       HPI:  Robin is a 61-year-old gentleman here with longstanding right shoulder pain.  He has a history of previous shoulder surgery and cuff repair followed by early failure and subsequent cuff debridement.  He has had about 4 to 6 weeks of increasing pain in the shoulder.  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 despite oral medication and topical medication ice and heat  Past Medical History:   Diagnosis Date   • Allergic    • DJD of shoulder 1/7/2021   • History of chronic sinusitis    • Hx of difficult intubation     X1   • Hypertension    • Kidney stone    • Obesity (BMI 30-39.9)    • Osteoarthritis of left glenohumeral joint 1/7/2021    Added automatically from request for surgery [id number]   • Sleep apnea     has CPAP   • Spinal stenosis    • Testicular hypofunction    • Thrombocytopenia, chronic    • Ureteral calculus 1/14/2022   • Vertigo        Past Surgical History:   Procedure Laterality Date   • CATARACT EXTRACTION Right    • COLON SURGERY     • COLONOSCOPY     • KNEE ARTHROSCOPY Bilateral     X3   • QUADRICEPS TENDON REPAIR     • RETINAL DETACHMENT REPAIR Right    • SEPTOPLASTY     • SHOULDER SURGERY Bilateral    • SINUS SURGERY     • TOTAL SHOULDER ARTHROPLASTY W/ DISTAL CLAVICLE EXCISION Left 1/19/2021    Procedure: TOTAL SHOULDER REVERSE ARTHROPLASTY;  Surgeon: Carl Hairston MD;  Location: [facility] OR;  Service: Orthopedics;  Laterality: Left;   • URETEROSCOPY LASER LITHOTRIPSY WITH STENT INSERTION Left 1/14/2022    Procedure: CYSTOSCOPY, LEFT URETEROSCOPY, BILATERAL RETROGRADE PYELOGRAM;  Surgeon: Aung Bolton MD;  Location: [facility] OR;  Service: Urology;  Laterality: Left;       Family History   Problem Relation Age of Onset   • Cancer Mother    • Sharon  "Hyperthermia Neg Hx           Social History     Occupational History   • Not on file   Tobacco Use   • Smoking status: Former Smoker     Packs/day: 0.50     Years: 30.00     Pack years: 15.00     Quit date: 2015     Years since quittin.5   • Smokeless tobacco: Never Used   Vaping Use   • Vaping Use: Never used   Substance and Sexual Activity   • Alcohol use: Yes     Comment: BEER OCCASIONALLY   • Drug use: Never   • Sexual activity: Defer      Review of Systems   Cardiovascular: Negative for chest pain.   Musculoskeletal: Positive for arthralgias.       Objective:    Pulse (!) 48   Ht 191.8 cm (75.5\")   Wt 123 kg (271 lb 9.6 oz)   BMI 33.50 kg/m²     Physical Examination:  Shoulder demonstrates healed incision over the anterior lateral acromion with mild supraspinatus atrophy.  Passive elevation 180 abduction 140  External rotation 30 internal rotation the right hip with pain and weakness on Speed, [place], supraspinatus testing.  Active elevation substitutes the scapula and is 160 degrees.  Deltoid function is intact.  Sensory and motor exam are intact all distributions. Radial pulse is palpable and capillary refill is less than two seconds to all digits.  Imaging:  right Shoulder X-Ray  Indication: Right shoulder pain history of previous cuff repair  AP Y and Lateral views  Findings: Moderate joint space narrowing with significant reduction of acromiohumeral distance  no bony lesion  Soft tissues normal  decreased joint spaces  Hardware appropriately positioned not applicable      no prior studies available for comparison.    Assessment:  Right shoulder degenerative disease with massive cuff tear    Plan:  Options discussed.  He would like to proceed with right reverse total shoulder arthroplasty.  Postop sling dispensed.  Discussed the risks including bleeding, scar, infection, stiffness, nerve, artery, vein and tendon damage, instability, fracture, DVT, loss of life or limb. Issues of scapular " notching and component revision were discussed. Questions were answered and addressed.  Greater than 15 minutes was spent demonstrating proper fit and use of the device and signs to monitor for complications      Procedures         Disclaimer: Part of this note may be an electronic transcription/translation of spoken language to printed text using the Dragon Dictation System

## 2022-08-01 NOTE — PROGRESS NOTES
Patient ID: Robin Wheatley is a 61 y.o. male.    Chief Complaint:    Chief Complaint   Patient presents with   • Right Shoulder - Initial Evaluation, Pain     Pain 6-8       HPI:  Robin is a 61-year-old gentleman here with longstanding right shoulder pain.  He has a history of previous shoulder surgery and cuff repair followed by early failure and subsequent cuff debridement.  He has had about 4 to 6 weeks of increasing pain in the shoulder.  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 despite oral medication and topical medication ice and heat  Past Medical History:   Diagnosis Date   • Allergic    • DJD of shoulder 1/7/2021   • History of chronic sinusitis    • Hx of difficult intubation     X1   • Hypertension    • Kidney stone    • Obesity (BMI 30-39.9)    • Osteoarthritis of left glenohumeral joint 1/7/2021    Added automatically from request for surgery [id number]   • Sleep apnea     has CPAP   • Spinal stenosis    • Testicular hypofunction    • Thrombocytopenia, chronic    • Ureteral calculus 1/14/2022   • Vertigo        Past Surgical History:   Procedure Laterality Date   • CATARACT EXTRACTION Right    • COLON SURGERY     • COLONOSCOPY     • KNEE ARTHROSCOPY Bilateral     X3   • QUADRICEPS TENDON REPAIR     • RETINAL DETACHMENT REPAIR Right    • SEPTOPLASTY     • SHOULDER SURGERY Bilateral    • SINUS SURGERY     • TOTAL SHOULDER ARTHROPLASTY W/ DISTAL CLAVICLE EXCISION Left 1/19/2021    Procedure: TOTAL SHOULDER REVERSE ARTHROPLASTY;  Surgeon: Carl Hairston MD;  Location: [facility] OR;  Service: Orthopedics;  Laterality: Left;   • URETEROSCOPY LASER LITHOTRIPSY WITH STENT INSERTION Left 1/14/2022    Procedure: CYSTOSCOPY, LEFT URETEROSCOPY, BILATERAL RETROGRADE PYELOGRAM;  Surgeon: Aung Bolton MD;  Location: [facility] OR;  Service: Urology;  Laterality: Left;       Family History   Problem Relation Age of Onset   • Cancer Mother    • Sharon  "Hyperthermia Neg Hx           Social History     Occupational History   • Not on file   Tobacco Use   • Smoking status: Former Smoker     Packs/day: 0.50     Years: 30.00     Pack years: 15.00     Quit date: 2015     Years since quittin.5   • Smokeless tobacco: Never Used   Vaping Use   • Vaping Use: Never used   Substance and Sexual Activity   • Alcohol use: Yes     Comment: BEER OCCASIONALLY   • Drug use: Never   • Sexual activity: Defer      Review of Systems   Cardiovascular: Negative for chest pain.   Musculoskeletal: Positive for arthralgias.       Objective:    Pulse (!) 48   Ht 191.8 cm (75.5\")   Wt 123 kg (271 lb 9.6 oz)   BMI 33.50 kg/m²     Physical Examination:  Shoulder demonstrates healed incision over the anterior lateral acromion with mild supraspinatus atrophy.  Passive elevation 180 abduction 140  External rotation 30 internal rotation the right hip with pain and weakness on Speed, Crosby, supraspinatus testing.  Active elevation substitutes the scapula and is 160 degrees.  Deltoid function is intact.  Sensory and motor exam are intact all distributions. Radial pulse is palpable and capillary refill is less than two seconds to all digits.  Imaging:  right Shoulder X-Ray  Indication: Right shoulder pain history of previous cuff repair  AP Y and Lateral views  Findings: Moderate joint space narrowing with significant reduction of acromiohumeral distance  no bony lesion  Soft tissues normal  decreased joint spaces  Hardware appropriately positioned not applicable      no prior studies available for comparison.    Assessment:  Right shoulder degenerative disease with massive cuff tear    Plan:  Options discussed.  He would like to proceed with right reverse total shoulder arthroplasty.  Postop sling dispensed.  Discussed the risks including bleeding, scar, infection, stiffness, nerve, artery, vein and tendon damage, instability, fracture, DVT, loss of life or limb. Issues of scapular " notching and component revision were discussed. Questions were answered and addressed.  Greater than 15 minutes was spent demonstrating proper fit and use of the device and signs to monitor for complications      Procedures         Disclaimer: Part of this note may be an electronic transcription/translation of spoken language to printed text using the Dragon Dictation System

## 2022-08-08 ENCOUNTER — LAB (OUTPATIENT)
Dept: LAB | Facility: HOSPITAL | Age: 62
End: 2022-08-08

## 2022-08-08 ENCOUNTER — TELEPHONE (OUTPATIENT)
Dept: FAMILY MEDICINE CLINIC | Facility: CLINIC | Age: 62
End: 2022-08-08

## 2022-08-08 DIAGNOSIS — E29.1 TESTICULAR HYPOFUNCTION: Chronic | ICD-10-CM

## 2022-08-08 DIAGNOSIS — I10 BENIGN ESSENTIAL HYPERTENSION: ICD-10-CM

## 2022-08-08 DIAGNOSIS — Z12.5 SCREENING PSA (PROSTATE SPECIFIC ANTIGEN): Primary | ICD-10-CM

## 2022-08-08 LAB
ALBUMIN SERPL-MCNC: 4.5 G/DL (ref 3.5–5.2)
ALBUMIN/GLOB SERPL: 3.5 G/DL
ALP SERPL-CCNC: 65 U/L (ref 39–117)
ALT SERPL W P-5'-P-CCNC: 10 U/L (ref 1–41)
ANION GAP SERPL CALCULATED.3IONS-SCNC: 10.9 MMOL/L (ref 5–15)
AST SERPL-CCNC: 23 U/L (ref 1–40)
BASOPHILS # BLD AUTO: 0.04 10*3/MM3 (ref 0–0.2)
BASOPHILS NFR BLD AUTO: 0.9 % (ref 0–1.5)
BILIRUB SERPL-MCNC: 0.5 MG/DL (ref 0–1.2)
BUN SERPL-MCNC: 20 MG/DL (ref 8–23)
BUN/CREAT SERPL: 22 (ref 7–25)
CALCIUM SPEC-SCNC: 9.4 MG/DL (ref 8.6–10.5)
CHLORIDE SERPL-SCNC: 103 MMOL/L (ref 98–107)
CHOLEST SERPL-MCNC: 158 MG/DL (ref 0–200)
CO2 SERPL-SCNC: 27.1 MMOL/L (ref 22–29)
CREAT SERPL-MCNC: 0.91 MG/DL (ref 0.76–1.27)
DEPRECATED RDW RBC AUTO: 38.8 FL (ref 37–54)
EGFRCR SERPLBLD CKD-EPI 2021: 95.3 ML/MIN/1.73
EOSINOPHIL # BLD AUTO: 0.15 10*3/MM3 (ref 0–0.4)
EOSINOPHIL NFR BLD AUTO: 3.3 % (ref 0.3–6.2)
ERYTHROCYTE [DISTWIDTH] IN BLOOD BY AUTOMATED COUNT: 11.8 % (ref 12.3–15.4)
GLOBULIN UR ELPH-MCNC: 1.3 GM/DL
GLUCOSE SERPL-MCNC: 91 MG/DL (ref 65–99)
HCT VFR BLD AUTO: 42.2 % (ref 37.5–51)
HDLC SERPL-MCNC: 43 MG/DL (ref 40–60)
HGB BLD-MCNC: 14.4 G/DL (ref 13–17.7)
IMM GRANULOCYTES # BLD AUTO: 0.03 10*3/MM3 (ref 0–0.05)
IMM GRANULOCYTES NFR BLD AUTO: 0.7 % (ref 0–0.5)
LDLC SERPL CALC-MCNC: 97 MG/DL (ref 0–100)
LDLC/HDLC SERPL: 2.22 {RATIO}
LYMPHOCYTES # BLD AUTO: 1.14 10*3/MM3 (ref 0.7–3.1)
LYMPHOCYTES NFR BLD AUTO: 25.2 % (ref 19.6–45.3)
MCH RBC QN AUTO: 31 PG (ref 26.6–33)
MCHC RBC AUTO-ENTMCNC: 34.1 G/DL (ref 31.5–35.7)
MCV RBC AUTO: 90.9 FL (ref 79–97)
MONOCYTES # BLD AUTO: 0.28 10*3/MM3 (ref 0.1–0.9)
MONOCYTES NFR BLD AUTO: 6.2 % (ref 5–12)
NEUTROPHILS NFR BLD AUTO: 2.88 10*3/MM3 (ref 1.7–7)
NEUTROPHILS NFR BLD AUTO: 63.7 % (ref 42.7–76)
NRBC BLD AUTO-RTO: 0 /100 WBC (ref 0–0.2)
PLATELET # BLD AUTO: 132 10*3/MM3 (ref 140–450)
PMV BLD AUTO: 11.9 FL (ref 6–12)
POTASSIUM SERPL-SCNC: 4.9 MMOL/L (ref 3.5–5.2)
PROT SERPL-MCNC: 5.8 G/DL (ref 6–8.5)
RBC # BLD AUTO: 4.64 10*6/MM3 (ref 4.14–5.8)
SODIUM SERPL-SCNC: 141 MMOL/L (ref 136–145)
TRIGL SERPL-MCNC: 97 MG/DL (ref 0–150)
TSH SERPL DL<=0.05 MIU/L-ACNC: 2.11 UIU/ML (ref 0.27–4.2)
VLDLC SERPL-MCNC: 18 MG/DL (ref 5–40)
WBC NRBC COR # BLD: 4.52 10*3/MM3 (ref 3.4–10.8)

## 2022-08-08 PROCEDURE — 36415 COLL VENOUS BLD VENIPUNCTURE: CPT

## 2022-08-08 PROCEDURE — 80061 LIPID PANEL: CPT

## 2022-08-08 PROCEDURE — 80050 GENERAL HEALTH PANEL: CPT

## 2022-08-08 NOTE — TELEPHONE ENCOUNTER
Caller: Robin Wheatley    Relationship: Self    Best call back number: [phone]    What orders are you requesting (i.e. lab or imaging): PSA TEST    In what timeframe would the patient need to come in: IN A COUPLE WEEKS     Where will you receive your lab/imaging services: [facility]     Additional notes: PATIENT STATED THAT HIS INSURANCE REQUIRES IT ANNUALLY. PLEASE ADVISE.

## 2022-08-11 ENCOUNTER — OFFICE VISIT (OUTPATIENT)
Dept: FAMILY MEDICINE CLINIC | Facility: CLINIC | Age: 62
End: 2022-08-11

## 2022-08-11 VITALS
BODY MASS INDEX: 32.76 KG/M2 | HEIGHT: 76 IN | DIASTOLIC BLOOD PRESSURE: 75 MMHG | HEART RATE: 63 BPM | TEMPERATURE: 96.6 F | OXYGEN SATURATION: 96 % | SYSTOLIC BLOOD PRESSURE: 126 MMHG | WEIGHT: 269 LBS

## 2022-08-11 DIAGNOSIS — J30.9 ALLERGIC RHINITIS, UNSPECIFIED SEASONALITY, UNSPECIFIED TRIGGER: Chronic | ICD-10-CM

## 2022-08-11 DIAGNOSIS — I10 BENIGN ESSENTIAL HYPERTENSION: Primary | Chronic | ICD-10-CM

## 2022-08-11 DIAGNOSIS — E29.1 TESTICULAR HYPOFUNCTION: Chronic | ICD-10-CM

## 2022-08-11 DIAGNOSIS — G47.30 SLEEP APNEA, UNSPECIFIED TYPE: Chronic | ICD-10-CM

## 2022-08-11 DIAGNOSIS — M15.9 PRIMARY OSTEOARTHRITIS INVOLVING MULTIPLE JOINTS: ICD-10-CM

## 2022-08-11 DIAGNOSIS — E03.9 ACQUIRED HYPOTHYROIDISM: Chronic | ICD-10-CM

## 2022-08-11 PROCEDURE — 99214 OFFICE O/P EST MOD 30 MIN: CPT | Performed by: FAMILY MEDICINE

## 2022-08-11 RX ORDER — TADALAFIL 5 MG/1
5 TABLET ORAL DAILY
Qty: 90 TABLET | Refills: 1 | Status: SHIPPED | OUTPATIENT
Start: 2022-08-11 | End: 2023-02-10

## 2022-08-11 RX ORDER — TADALAFIL 5 MG/1
5 TABLET ORAL DAILY
COMMUNITY
End: 2022-08-11 | Stop reason: SDUPTHER

## 2022-08-11 RX ORDER — ACETAMINOPHEN 500 MG
1000 TABLET ORAL AS NEEDED
COMMUNITY

## 2022-08-11 NOTE — PROGRESS NOTES
"Subjective   Robin Wheatley is a 61 y.o. male.     Chief Complaint   Patient presents with   • Hypertension   • Hypothyroidism     6 month f/u       HPI  Complaint: Hypothyroidism vertigo hypertension arthritis    The patient is a 61-year-old white male comes in for follow-up and maintenance of his current problems which include    1.  Hypertension-stable-patient on Benicar 10 mg daily.  He denied headache lightheadedness dizziness or chest pain.    2.  Vertigo-stable-patient is currently on Lipo flavonoid over-the-counter and Trental 400 mg twice a day.  He states that this controls his vertigo.    3.  Arthritis-deteriorated after patient has history of osteoarthritis involving multiple joints.  Patient is currently on meloxicam 15 mg daily glucosamine/chondroitin 2 capsules twice a day tramadol 50 mg every 6 hours as needed.  Patient is scheduled for total shoulder replacement on the 24th of this month.    4.  Hypothyroidism-stable-patient is on [place] Thyroid 60 mg daily.  TSH has been in the therapeutic range.    5.  Sleep apnea-stable after patient is on CPAP.    6. Allergic rhinitis-stable-patient is on Zyrtec 5 to 10 mg at night  And Flonase.        The following portions of the patient's history were reviewed and updated as appropriate: allergies, current medications, past family history, past medical history, past social history, past surgical history and problem list.    Review of Systems    Objective     /75 (BP Location: Left arm, Patient Position: Sitting, Cuff Size: Adult)   Pulse 63   Temp 96.6 °F (35.9 °C) (Infrared)   Ht 191.8 cm (75.5\")   Wt 122 kg (269 lb)   SpO2 96%   BMI 33.18 kg/m²     Physical Exam  Vitals and nursing note reviewed.   Constitutional:       Appearance: He is well-developed.   HENT:      Head: Normocephalic and atraumatic.      Nose: Nose normal.      Mouth/Throat:      Mouth: Mucous membranes are moist.      Pharynx: Oropharynx is clear.   Eyes:      Extraocular " Movements: Extraocular movements intact.      Conjunctiva/sclera: Conjunctivae normal.      Pupils: Pupils are equal, round, and reactive to light.   Cardiovascular:      Rate and Rhythm: Normal rate and regular rhythm.      Pulses: Normal pulses.      Heart sounds: Normal heart sounds. No murmur heard.    No friction rub. No gallop.   Pulmonary:      Effort: Pulmonary effort is normal.      Breath sounds: Normal breath sounds.   Abdominal:      General: Bowel sounds are normal.      Palpations: Abdomen is soft.   Musculoskeletal:         General: Normal range of motion.      Cervical back: Neck supple.   Skin:     General: Skin is warm and dry.   Neurological:      Mental Status: He is alert and oriented to person, place, and time.   Psychiatric:         Behavior: Behavior normal.         Thought Content: Thought content normal.         Judgment: Judgment normal.       TSH (08/08/2022 11:15)  Comprehensive Metabolic Panel (08/08/2022 11:15)  CBC & Differential (08/08/2022 11:15)  Lipid Panel (08/08/2022 11:15)      Assessment & Plan   Diagnoses and all orders for this visit:    1. Benign essential hypertension (Primary)    2. Acquired hypothyroidism    3. Testicular hypofunction    4. Primary osteoarthritis involving multiple joints    5. Sleep apnea, unspecified type    6. Allergic rhinitis, unspecified seasonality, unspecified trigger    Other orders  -     tadalafil (CIALIS) 5 MG tablet; Take 1 tablet by mouth Daily.  Dispense: 90 tablet; Refill: 1      Patient Instructions   Continue your current medications and treatment.    Follow up in the office in 6 months.    Laboratory testing at that time.      Marlo Michaels Jr., MD    08/11/22

## 2022-08-12 ENCOUNTER — HOSPITAL ENCOUNTER (OUTPATIENT)
Dept: GENERAL RADIOLOGY | Facility: HOSPITAL | Age: 62
Discharge: HOME | End: 2022-08-12

## 2022-08-12 ENCOUNTER — LAB (OUTPATIENT)
Dept: LAB | Facility: HOSPITAL | Age: 62
End: 2022-08-12

## 2022-08-12 ENCOUNTER — HOSPITAL ENCOUNTER (OUTPATIENT)
Dept: CT IMAGING | Facility: HOSPITAL | Age: 62
Discharge: HOME | End: 2022-08-12

## 2022-08-12 ENCOUNTER — HOSPITAL ENCOUNTER (OUTPATIENT)
Dept: CARDIOLOGY | Facility: HOSPITAL | Age: 62
Discharge: HOME | End: 2022-08-12

## 2022-08-12 DIAGNOSIS — M19.011 OSTEOARTHRITIS OF RIGHT GLENOHUMERAL JOINT: ICD-10-CM

## 2022-08-12 DIAGNOSIS — M25.511 ACUTE PAIN OF RIGHT SHOULDER: ICD-10-CM

## 2022-08-12 DIAGNOSIS — Z12.5 SCREENING PSA (PROSTATE SPECIFIC ANTIGEN): ICD-10-CM

## 2022-08-12 DIAGNOSIS — M75.121 COMPLETE TEAR OF RIGHT ROTATOR CUFF, UNSPECIFIED WHETHER TRAUMATIC: ICD-10-CM

## 2022-08-12 LAB
ABO GROUP BLD: NORMAL
ANION GAP SERPL CALCULATED.3IONS-SCNC: 10.3 MMOL/L (ref 5–15)
APTT PPP: 30.6 SECONDS (ref 24–31)
BASOPHILS # BLD AUTO: 0.04 10*3/MM3 (ref 0–0.2)
BASOPHILS NFR BLD AUTO: 0.9 % (ref 0–1.5)
BLD GP AB SCN SERPL QL: NEGATIVE
BUN SERPL-MCNC: 25 MG/DL (ref 8–23)
BUN/CREAT SERPL: 25.5 (ref 7–25)
CALCIUM SPEC-SCNC: 9.5 MG/DL (ref 8.6–10.5)
CHLORIDE SERPL-SCNC: 106 MMOL/L (ref 98–107)
CO2 SERPL-SCNC: 27.7 MMOL/L (ref 22–29)
CREAT SERPL-MCNC: 0.98 MG/DL (ref 0.76–1.27)
DEPRECATED RDW RBC AUTO: 38.5 FL (ref 37–54)
EGFRCR SERPLBLD CKD-EPI 2021: 87.2 ML/MIN/1.73
EOSINOPHIL # BLD AUTO: 0.13 10*3/MM3 (ref 0–0.4)
EOSINOPHIL NFR BLD AUTO: 3 % (ref 0.3–6.2)
ERYTHROCYTE [DISTWIDTH] IN BLOOD BY AUTOMATED COUNT: 11.7 % (ref 12.3–15.4)
GLUCOSE SERPL-MCNC: 62 MG/DL (ref 65–99)
HBA1C MFR BLD: 5.3 % (ref 3.5–5.6)
HCT VFR BLD AUTO: 41.2 % (ref 37.5–51)
HGB BLD-MCNC: 14 G/DL (ref 13–17.7)
IMM GRANULOCYTES # BLD AUTO: 0.01 10*3/MM3 (ref 0–0.05)
IMM GRANULOCYTES NFR BLD AUTO: 0.2 % (ref 0–0.5)
INR PPP: 1.03 (ref 0.93–1.1)
LYMPHOCYTES # BLD AUTO: 1.02 10*3/MM3 (ref 0.7–3.1)
LYMPHOCYTES NFR BLD AUTO: 23.8 % (ref 19.6–45.3)
MCH RBC QN AUTO: 31.3 PG (ref 26.6–33)
MCHC RBC AUTO-ENTMCNC: 34 G/DL (ref 31.5–35.7)
MCV RBC AUTO: 92 FL (ref 79–97)
MONOCYTES # BLD AUTO: 0.31 10*3/MM3 (ref 0.1–0.9)
MONOCYTES NFR BLD AUTO: 7.2 % (ref 5–12)
MRSA DNA SPEC QL NAA+PROBE: NORMAL
NEUTROPHILS NFR BLD AUTO: 2.77 10*3/MM3 (ref 1.7–7)
NEUTROPHILS NFR BLD AUTO: 64.9 % (ref 42.7–76)
NRBC BLD AUTO-RTO: 0 /100 WBC (ref 0–0.2)
PLATELET # BLD AUTO: 123 10*3/MM3 (ref 140–450)
PMV BLD AUTO: 11.9 FL (ref 6–12)
POTASSIUM SERPL-SCNC: 4.6 MMOL/L (ref 3.5–5.2)
PROTHROMBIN TIME: 10.6 SECONDS (ref 9.6–11.7)
PSA SERPL-MCNC: 0.27 NG/ML (ref 0–4)
QT INTERVAL: 437 MS
RBC # BLD AUTO: 4.48 10*6/MM3 (ref 4.14–5.8)
RH BLD: POSITIVE
SODIUM SERPL-SCNC: 144 MMOL/L (ref 136–145)
T&S EXPIRATION DATE: NORMAL
WBC NRBC COR # BLD: 4.28 10*3/MM3 (ref 3.4–10.8)

## 2022-08-12 PROCEDURE — 73200 CT UPPER EXTREMITY W/O DYE: CPT

## 2022-08-12 PROCEDURE — 93010 ELECTROCARDIOGRAM REPORT: CPT | Performed by: INTERNAL MEDICINE

## 2022-08-12 PROCEDURE — 93005 ELECTROCARDIOGRAM TRACING: CPT | Performed by: ORTHOPAEDIC SURGERY

## 2022-08-12 PROCEDURE — 85610 PROTHROMBIN TIME: CPT

## 2022-08-12 PROCEDURE — 71046 X-RAY EXAM CHEST 2 VIEWS: CPT

## 2022-08-12 PROCEDURE — 36415 COLL VENOUS BLD VENIPUNCTURE: CPT

## 2022-08-12 PROCEDURE — 86900 BLOOD TYPING SEROLOGIC ABO: CPT

## 2022-08-12 PROCEDURE — 85025 COMPLETE CBC W/AUTO DIFF WBC: CPT

## 2022-08-12 PROCEDURE — 87641 MR-STAPH DNA AMP PROBE: CPT

## 2022-08-12 PROCEDURE — 85730 THROMBOPLASTIN TIME PARTIAL: CPT

## 2022-08-12 PROCEDURE — 86850 RBC ANTIBODY SCREEN: CPT

## 2022-08-12 PROCEDURE — 80048 BASIC METABOLIC PNL TOTAL CA: CPT

## 2022-08-12 PROCEDURE — 83036 HEMOGLOBIN GLYCOSYLATED A1C: CPT

## 2022-08-12 PROCEDURE — G0103 PSA SCREENING: HCPCS

## 2022-08-12 PROCEDURE — 86901 BLOOD TYPING SEROLOGIC RH(D): CPT

## 2022-08-22 ENCOUNTER — TELEPHONE (OUTPATIENT)
Dept: ORTHOPEDIC SURGERY | Facility: CLINIC | Age: 62
End: 2022-08-22

## 2022-08-22 ENCOUNTER — LAB (OUTPATIENT)
Dept: LAB | Facility: HOSPITAL | Age: 62
End: 2022-08-22

## 2022-08-22 DIAGNOSIS — M19.011 OSTEOARTHRITIS OF RIGHT GLENOHUMERAL JOINT: ICD-10-CM

## 2022-08-22 LAB — SARS-COV-2 ORF1AB RESP QL NAA+PROBE: NOT DETECTED

## 2022-08-22 PROCEDURE — C9803 HOPD COVID-19 SPEC COLLECT: HCPCS

## 2022-08-22 PROCEDURE — U0004 COV-19 TEST NON-CDC HGH THRU: HCPCS

## 2022-08-22 NOTE — DISCHARGE PLACEMENT REQUEST
"Robin Wheatley (62 y.o. Male)             Date of Birth   1960    Social Security Number       Address   [address] [place] [address]    Home Phone   [phone]    MRN   [id number]       Anglican   Jew    Marital Status                               Admission Date       Admission Type   Elective    Admitting Provider   Carl Hairston MD    Attending Provider   Carl Hairston MD    Department, Room/Bed   [facility] MAIN OR, --/--       Discharge Date       Discharge Disposition       Discharge Destination                               Attending Provider: Carl Hairston MD    Allergies: Ciprofloxacin, Tamsulosin, Levofloxacin    Isolation: None   Infection: COVID Screen (preop/placement) (08/01/22)   Code Status: Prior   Advance Care Planning Activity    Ht: 190.5 cm (75\")   Wt: 122 kg (268 lb)    Admission Cmt: None   Principal Problem: DJD of right shoulder [M19.011]                 Active Insurance as of 8/24/2022     Primary Coverage     Payor Plan Insurance Group Employer/Plan Group    [employer] BLUE CROSS [employer] BLUE CROSS BLUE SHIELD PPO [id number]     Payor Plan Address Payor Plan Phone Number Payor Plan Fax Number Effective Dates    [address] [phone]  7/1/2020 - None Entered    [place], [place] [address]       Subscriber Name Subscriber Birth Date Member ID       ROBIN WHEATLEY 1960 [id number]                 Emergency Contacts      (Rel.) Home Phone Work Phone Mobile Phone    BURTONSATINDER (Spouse) [phone] -- [phone]              "

## 2022-08-22 NOTE — TELEPHONE ENCOUNTER
Caller: Robin Wheatley    Relationship: Self    Best call back number: [phone] HOME [phone] CELL    What form or medical record are you requesting: [facility] PAPERWORK    Who is requesting this form or medical record from you[place] [facility] [place]    How would you like to receive the form or medical records (pick-up, mail, fax): FAX/    If fax, what is the fax number: PLEASE SEND TO FAX ON PAPERWORK -       Timeframe paperwork needed: BY 8.24.22    Additional notes: PATIENT STATED THAT HE DROPPED THE PAPERWORK OFF EITHER 8.8.22 OR 8.15.22 - HE STATED HE DID PAY THE $25 FEE AND IT NEEDS TO BE RECEIVED BY [facility] BY THE 24TH. PATIENT WOULD LIKE A CALL GIVING HIM AN UPDATE.     SENDING BACK AS URGENT MESSAGE - PATIENT WANTED TO SIT ON HOLD UNTIL SOMEONE WAS AVAILABLE.     TY

## 2022-08-22 NOTE — TELEPHONE ENCOUNTER
ATTEMPTED TO WARM TRANSFER - PATIENT SPOUSE CALLED T ADVISE SHE GOT THE MESSAGE ABOUT THE PAPERWORK BEING SENT TOMORROW 08 23 22 - PER SPOUSE IT HAS TO BE SENT TOMORROW, BECAUSE PATIENT'S EMPLOYER SAID IF DOESN'T HAVE IT BY 08 24 22 COULD AFFECT THE PATIENT BEING OFF WORK FOR THE SX.  HUB ADVISED WOULD LET CLINIAL KNOW.

## 2022-08-23 ENCOUNTER — ANESTHESIA EVENT (OUTPATIENT)
Dept: PERIOP | Facility: HOSPITAL | Age: 62
End: 2022-08-23

## 2022-08-24 ENCOUNTER — HOSPITAL ENCOUNTER (OUTPATIENT)
Facility: HOSPITAL | Age: 62
Discharge: HOME | End: 2022-08-25
Attending: ORTHOPAEDIC SURGERY | Admitting: ORTHOPAEDIC SURGERY

## 2022-08-24 ENCOUNTER — APPOINTMENT (OUTPATIENT)
Dept: GENERAL RADIOLOGY | Facility: HOSPITAL | Age: 62
End: 2022-08-24

## 2022-08-24 ENCOUNTER — ANESTHESIA (OUTPATIENT)
Dept: PERIOP | Facility: HOSPITAL | Age: 62
End: 2022-08-24

## 2022-08-24 DIAGNOSIS — Z96.611 STATUS POST REVERSE TOTAL SHOULDER REPLACEMENT, RIGHT: ICD-10-CM

## 2022-08-24 DIAGNOSIS — M19.011 DJD OF RIGHT SHOULDER: Primary | ICD-10-CM

## 2022-08-24 DIAGNOSIS — M19.011 OSTEOARTHRITIS OF RIGHT GLENOHUMERAL JOINT: ICD-10-CM

## 2022-08-24 PROBLEM — R00.1 BRADYCARDIA: Status: ACTIVE | Noted: 2022-08-24

## 2022-08-24 LAB
MAGNESIUM SERPL-MCNC: 2.1 MG/DL (ref 1.6–2.4)
TSH SERPL DL<=0.05 MIU/L-ACNC: 2 UIU/ML (ref 0.27–4.2)

## 2022-08-24 PROCEDURE — 84443 ASSAY THYROID STIM HORMONE: CPT | Performed by: NURSE PRACTITIONER

## 2022-08-24 PROCEDURE — 76942 ECHO GUIDE FOR BIOPSY: CPT | Performed by: ORTHOPAEDIC SURGERY

## 2022-08-24 PROCEDURE — C1776 JOINT DEVICE (IMPLANTABLE): HCPCS | Performed by: ORTHOPAEDIC SURGERY

## 2022-08-24 PROCEDURE — 25010000002 DEXAMETHASONE PER 1 MG: Performed by: ANESTHESIOLOGY

## 2022-08-24 PROCEDURE — 23472 RECONSTRUCT SHOULDER JOINT: CPT | Performed by: PHYSICIAN ASSISTANT

## 2022-08-24 PROCEDURE — 25010000002 VANCOMYCIN 1 G RECONSTITUTED SOLUTION 1 EACH VIAL: Performed by: ORTHOPAEDIC SURGERY

## 2022-08-24 PROCEDURE — 83735 ASSAY OF MAGNESIUM: CPT | Performed by: NURSE PRACTITIONER

## 2022-08-24 PROCEDURE — 0 BUPIVACAINE LIPOSOME 1.3 % SUSPENSION: Performed by: ANESTHESIOLOGY

## 2022-08-24 PROCEDURE — 25010000002 PROPOFOL 1000 MG/100ML EMULSION: Performed by: ANESTHESIOLOGY

## 2022-08-24 PROCEDURE — 25010000002 ONDANSETRON PER 1 MG: Performed by: ANESTHESIOLOGY

## 2022-08-24 PROCEDURE — 25010000002 HYDROMORPHONE 1 MG/ML SOLUTION: Performed by: ANESTHESIOLOGY

## 2022-08-24 PROCEDURE — 25010000002 MIDAZOLAM PER 1 MG: Performed by: ANESTHESIOLOGY

## 2022-08-24 PROCEDURE — C9290 INJ, BUPIVACAINE LIPOSOME: HCPCS | Performed by: ANESTHESIOLOGY

## 2022-08-24 PROCEDURE — 0 LIDOCAINE 1 % SOLUTION 10 ML VIAL: Performed by: ORTHOPAEDIC SURGERY

## 2022-08-24 PROCEDURE — 0 LIDOCAINE 1 % SOLUTION: Performed by: ANESTHESIOLOGY

## 2022-08-24 PROCEDURE — 25010000002 PROPOFOL 10 MG/ML EMULSION: Performed by: ANESTHESIOLOGY

## 2022-08-24 PROCEDURE — G0378 HOSPITAL OBSERVATION PER HR: HCPCS

## 2022-08-24 PROCEDURE — C1713 ANCHOR/SCREW BN/BN,TIS/BN: HCPCS | Performed by: ORTHOPAEDIC SURGERY

## 2022-08-24 PROCEDURE — 97166 OT EVAL MOD COMPLEX 45 MIN: CPT

## 2022-08-24 PROCEDURE — 25010000002 PROPOFOL 500 MG/50ML EMULSION: Performed by: ANESTHESIOLOGY

## 2022-08-24 PROCEDURE — 23472 RECONSTRUCT SHOULDER JOINT: CPT | Performed by: ORTHOPAEDIC SURGERY

## 2022-08-24 PROCEDURE — 25010000002 CEFAZOLIN PER 500 MG: Performed by: ORTHOPAEDIC SURGERY

## 2022-08-24 PROCEDURE — 99204 OFFICE O/P NEW MOD 45 MIN: CPT | Performed by: NURSE PRACTITIONER

## 2022-08-24 PROCEDURE — 73030 X-RAY EXAM OF SHOULDER: CPT

## 2022-08-24 PROCEDURE — 25010000002 FENTANYL CITRATE (PF) 50 MCG/ML SOLUTION: Performed by: ANESTHESIOLOGY

## 2022-08-24 DEVICE — SPACR HUM REV TM PLS 9MM: Type: IMPLANTABLE DEVICE | Site: SHOULDER | Status: FUNCTIONAL

## 2022-08-24 DEVICE — STEM HUM/SHLDR TRABECULARMETAL REV 16X130MM: Type: IMPLANTABLE DEVICE | Site: SHOULDER | Status: FUNCTIONAL

## 2022-08-24 DEVICE — INVERSE/REVERSE SCREW SYSTEM, 4.5-27
Type: IMPLANTABLE DEVICE | Site: SHOULDER | Status: FUNCTIONAL
Brand: INVERSE/REVERSE

## 2022-08-24 DEVICE — LINER HUM/SHLDR TRABECULARMETAL REV POLY 60DEG 40MM PLS0: Type: IMPLANTABLE DEVICE | Site: SHOULDER | Status: FUNCTIONAL

## 2022-08-24 DEVICE — SUT NONABS MAXBRAID/PE NMBR2 HC5 38IN BLU 900334: Type: IMPLANTABLE DEVICE | Site: SHOULDER | Status: FUNCTIONAL

## 2022-08-24 DEVICE — GLENSPHR TRABECULARMETAL REV 40MM: Type: IMPLANTABLE DEVICE | Site: SHOULDER | Status: FUNCTIONAL

## 2022-08-24 DEVICE — TOTL SHLDER REV: Type: IMPLANTABLE DEVICE | Site: SHOULDER | Status: FUNCTIONAL

## 2022-08-24 DEVICE — BASEPLT GLEN TRABECULARMETAL REV 15MM: Type: IMPLANTABLE DEVICE | Site: SHOULDER | Status: FUNCTIONAL

## 2022-08-24 DEVICE — INVERSE/REVERSE SCREW SYSTEM, 4.5-24
Type: IMPLANTABLE DEVICE | Site: SHOULDER | Status: FUNCTIONAL
Brand: INVERSE/REVERSE

## 2022-08-24 RX ORDER — PROMETHAZINE HYDROCHLORIDE 12.5 MG/1
12.5 TABLET ORAL EVERY 6 HOURS PRN
Qty: 21 TABLET | Refills: 1 | Status: SHIPPED | OUTPATIENT
Start: 2022-08-24 | End: 2022-10-06

## 2022-08-24 RX ORDER — POTASSIUM CHLORIDE 7.45 MG/ML
10 INJECTION INTRAVENOUS
Status: DISCONTINUED | OUTPATIENT
Start: 2022-08-24 | End: 2022-08-25 | Stop reason: HOSPADM

## 2022-08-24 RX ORDER — PROMETHAZINE HYDROCHLORIDE 25 MG/1
25 SUPPOSITORY RECTAL ONCE AS NEEDED
Status: DISCONTINUED | OUTPATIENT
Start: 2022-08-24 | End: 2022-08-24 | Stop reason: HOSPADM

## 2022-08-24 RX ORDER — DIPHENHYDRAMINE HCL 25 MG
25 CAPSULE ORAL EVERY 6 HOURS PRN
Status: DISCONTINUED | OUTPATIENT
Start: 2022-08-24 | End: 2022-08-25 | Stop reason: HOSPADM

## 2022-08-24 RX ORDER — ONDANSETRON 4 MG/1
4 TABLET, FILM COATED ORAL EVERY 6 HOURS PRN
Status: DISCONTINUED | OUTPATIENT
Start: 2022-08-24 | End: 2022-08-25 | Stop reason: HOSPADM

## 2022-08-24 RX ORDER — HYDRALAZINE HYDROCHLORIDE 20 MG/ML
10 INJECTION INTRAMUSCULAR; INTRAVENOUS EVERY 6 HOURS PRN
Status: DISCONTINUED | OUTPATIENT
Start: 2022-08-24 | End: 2022-08-25 | Stop reason: HOSPADM

## 2022-08-24 RX ORDER — PROPOFOL 10 MG/ML
VIAL (ML) INTRAVENOUS AS NEEDED
Status: DISCONTINUED | OUTPATIENT
Start: 2022-08-24 | End: 2022-08-24 | Stop reason: SURG

## 2022-08-24 RX ORDER — ONDANSETRON 2 MG/ML
4 INJECTION INTRAMUSCULAR; INTRAVENOUS EVERY 6 HOURS PRN
Status: DISCONTINUED | OUTPATIENT
Start: 2022-08-24 | End: 2022-08-25 | Stop reason: HOSPADM

## 2022-08-24 RX ORDER — ACETAMINOPHEN 650 MG/1
650 SUPPOSITORY RECTAL EVERY 4 HOURS PRN
Status: DISCONTINUED | OUTPATIENT
Start: 2022-08-24 | End: 2022-08-25 | Stop reason: HOSPADM

## 2022-08-24 RX ORDER — HYDRALAZINE HYDROCHLORIDE 20 MG/ML
5 INJECTION INTRAMUSCULAR; INTRAVENOUS
Status: DISCONTINUED | OUTPATIENT
Start: 2022-08-24 | End: 2022-08-24 | Stop reason: HOSPADM

## 2022-08-24 RX ORDER — TRANEXAMIC ACID 10 MG/ML
1000 INJECTION, SOLUTION INTRAVENOUS ONCE
Status: COMPLETED | OUTPATIENT
Start: 2022-08-24 | End: 2022-08-24

## 2022-08-24 RX ORDER — GLYCOPYRROLATE 0.2 MG/ML
INJECTION INTRAMUSCULAR; INTRAVENOUS AS NEEDED
Status: DISCONTINUED | OUTPATIENT
Start: 2022-08-24 | End: 2022-08-24 | Stop reason: SURG

## 2022-08-24 RX ORDER — ASPIRIN 325 MG
325 TABLET, DELAYED RELEASE (ENTERIC COATED) ORAL DAILY
Status: DISCONTINUED | OUTPATIENT
Start: 2022-08-25 | End: 2022-08-25 | Stop reason: HOSPADM

## 2022-08-24 RX ORDER — TRAMADOL HYDROCHLORIDE 50 MG/1
50 TABLET ORAL EVERY 6 HOURS PRN
Status: DISCONTINUED | OUTPATIENT
Start: 2022-08-24 | End: 2022-08-25 | Stop reason: HOSPADM

## 2022-08-24 RX ORDER — FLUMAZENIL 0.1 MG/ML
0.2 INJECTION INTRAVENOUS AS NEEDED
Status: DISCONTINUED | OUTPATIENT
Start: 2022-08-24 | End: 2022-08-24 | Stop reason: HOSPADM

## 2022-08-24 RX ORDER — PROPOFOL 10 MG/ML
INJECTION, EMULSION INTRAVENOUS CONTINUOUS PRN
Status: DISCONTINUED | OUTPATIENT
Start: 2022-08-24 | End: 2022-08-24 | Stop reason: SURG

## 2022-08-24 RX ORDER — BISACODYL 10 MG
10 SUPPOSITORY, RECTAL RECTAL DAILY PRN
Status: DISCONTINUED | OUTPATIENT
Start: 2022-08-24 | End: 2022-08-25 | Stop reason: HOSPADM

## 2022-08-24 RX ORDER — DEXAMETHASONE SODIUM PHOSPHATE 4 MG/ML
INJECTION, SOLUTION INTRA-ARTICULAR; INTRALESIONAL; INTRAMUSCULAR; INTRAVENOUS; SOFT TISSUE
Status: COMPLETED | OUTPATIENT
Start: 2022-08-24 | End: 2022-08-24

## 2022-08-24 RX ORDER — CEFAZOLIN SODIUM IN 0.9 % NACL 3 G/100 ML
3 INTRAVENOUS SOLUTION, PIGGYBACK (ML) INTRAVENOUS ONCE
Status: COMPLETED | OUTPATIENT
Start: 2022-08-24 | End: 2022-08-24

## 2022-08-24 RX ORDER — LIDOCAINE HYDROCHLORIDE 10 MG/ML
INJECTION, SOLUTION INFILTRATION; PERINEURAL AS NEEDED
Status: DISCONTINUED | OUTPATIENT
Start: 2022-08-24 | End: 2022-08-24 | Stop reason: SURG

## 2022-08-24 RX ORDER — DIPHENHYDRAMINE HCL 25 MG
25 CAPSULE ORAL NIGHTLY PRN
Status: DISCONTINUED | OUTPATIENT
Start: 2022-08-24 | End: 2022-08-25 | Stop reason: HOSPADM

## 2022-08-24 RX ORDER — DROPERIDOL 2.5 MG/ML
1.25 INJECTION, SOLUTION INTRAMUSCULAR; INTRAVENOUS ONCE AS NEEDED
Status: DISCONTINUED | OUTPATIENT
Start: 2022-08-24 | End: 2022-08-24 | Stop reason: HOSPADM

## 2022-08-24 RX ORDER — MEPERIDINE HYDROCHLORIDE 25 MG/ML
12.5 INJECTION INTRAMUSCULAR; INTRAVENOUS; SUBCUTANEOUS
Status: DISCONTINUED | OUTPATIENT
Start: 2022-08-24 | End: 2022-08-24 | Stop reason: HOSPADM

## 2022-08-24 RX ORDER — SODIUM CHLORIDE 0.9 % (FLUSH) 0.9 %
10 SYRINGE (ML) INJECTION EVERY 12 HOURS SCHEDULED
Status: DISCONTINUED | OUTPATIENT
Start: 2022-08-24 | End: 2022-08-24 | Stop reason: HOSPADM

## 2022-08-24 RX ORDER — PENTOXIFYLLINE 400 MG/1
400 TABLET, EXTENDED RELEASE ORAL EVERY 12 HOURS
Status: DISCONTINUED | OUTPATIENT
Start: 2022-08-25 | End: 2022-08-25 | Stop reason: HOSPADM

## 2022-08-24 RX ORDER — ROCURONIUM BROMIDE 10 MG/ML
INJECTION, SOLUTION INTRAVENOUS AS NEEDED
Status: DISCONTINUED | OUTPATIENT
Start: 2022-08-24 | End: 2022-08-24 | Stop reason: SURG

## 2022-08-24 RX ORDER — NEOSTIGMINE METHYLSULFATE 5 MG/5 ML
SYRINGE (ML) INTRAVENOUS AS NEEDED
Status: DISCONTINUED | OUTPATIENT
Start: 2022-08-24 | End: 2022-08-24 | Stop reason: SURG

## 2022-08-24 RX ORDER — OXYCODONE HYDROCHLORIDE 5 MG/1
10 TABLET ORAL EVERY 4 HOURS PRN
Status: DISCONTINUED | OUTPATIENT
Start: 2022-08-24 | End: 2022-08-25 | Stop reason: HOSPADM

## 2022-08-24 RX ORDER — OXYCODONE HCL 10 MG/1
10 TABLET, FILM COATED, EXTENDED RELEASE ORAL ONCE
Status: COMPLETED | OUTPATIENT
Start: 2022-08-24 | End: 2022-08-24

## 2022-08-24 RX ORDER — BUPIVACAINE HYDROCHLORIDE 5 MG/ML
INJECTION, SOLUTION EPIDURAL; INTRACAUDAL
Status: COMPLETED | OUTPATIENT
Start: 2022-08-24 | End: 2022-08-24

## 2022-08-24 RX ORDER — DIPHENHYDRAMINE HYDROCHLORIDE 50 MG/ML
12.5 INJECTION INTRAMUSCULAR; INTRAVENOUS
Status: DISCONTINUED | OUTPATIENT
Start: 2022-08-24 | End: 2022-08-24 | Stop reason: HOSPADM

## 2022-08-24 RX ORDER — IPRATROPIUM BROMIDE AND ALBUTEROL SULFATE 2.5; .5 MG/3ML; MG/3ML
3 SOLUTION RESPIRATORY (INHALATION) ONCE AS NEEDED
Status: DISCONTINUED | OUTPATIENT
Start: 2022-08-24 | End: 2022-08-24 | Stop reason: HOSPADM

## 2022-08-24 RX ORDER — EPHEDRINE SULFATE 5 MG/ML
INJECTION INTRAVENOUS AS NEEDED
Status: DISCONTINUED | OUTPATIENT
Start: 2022-08-24 | End: 2022-08-24 | Stop reason: SURG

## 2022-08-24 RX ORDER — LABETALOL HYDROCHLORIDE 5 MG/ML
5 INJECTION, SOLUTION INTRAVENOUS
Status: DISCONTINUED | OUTPATIENT
Start: 2022-08-24 | End: 2022-08-24 | Stop reason: HOSPADM

## 2022-08-24 RX ORDER — NALOXONE HCL 0.4 MG/ML
0.4 VIAL (ML) INJECTION AS NEEDED
Status: DISCONTINUED | OUTPATIENT
Start: 2022-08-24 | End: 2022-08-24 | Stop reason: HOSPADM

## 2022-08-24 RX ORDER — OXYCODONE HCL 10 MG/1
20 TABLET, FILM COATED, EXTENDED RELEASE ORAL EVERY 12 HOURS SCHEDULED
Status: DISCONTINUED | OUTPATIENT
Start: 2022-08-24 | End: 2022-08-25 | Stop reason: HOSPADM

## 2022-08-24 RX ORDER — SODIUM CHLORIDE 9 MG/ML
75 INJECTION, SOLUTION INTRAVENOUS CONTINUOUS
Status: DISCONTINUED | OUTPATIENT
Start: 2022-08-24 | End: 2022-08-25 | Stop reason: HOSPADM

## 2022-08-24 RX ORDER — SODIUM CHLORIDE 0.9 % (FLUSH) 0.9 %
10 SYRINGE (ML) INJECTION AS NEEDED
Status: DISCONTINUED | OUTPATIENT
Start: 2022-08-24 | End: 2022-08-24 | Stop reason: HOSPADM

## 2022-08-24 RX ORDER — MIDAZOLAM HYDROCHLORIDE 1 MG/ML
INJECTION INTRAMUSCULAR; INTRAVENOUS
Status: COMPLETED | OUTPATIENT
Start: 2022-08-24 | End: 2022-08-24

## 2022-08-24 RX ORDER — ACETAMINOPHEN 325 MG/1
650 TABLET ORAL EVERY 4 HOURS PRN
Status: DISCONTINUED | OUTPATIENT
Start: 2022-08-24 | End: 2022-08-25 | Stop reason: HOSPADM

## 2022-08-24 RX ORDER — POTASSIUM CHLORIDE 20 MEQ/1
40 TABLET, EXTENDED RELEASE ORAL AS NEEDED
Status: DISCONTINUED | OUTPATIENT
Start: 2022-08-24 | End: 2022-08-25 | Stop reason: HOSPADM

## 2022-08-24 RX ORDER — LOSARTAN POTASSIUM 25 MG/1
25 TABLET ORAL
Status: DISCONTINUED | OUTPATIENT
Start: 2022-08-25 | End: 2022-08-25 | Stop reason: HOSPADM

## 2022-08-24 RX ORDER — ONDANSETRON 2 MG/ML
INJECTION INTRAMUSCULAR; INTRAVENOUS AS NEEDED
Status: DISCONTINUED | OUTPATIENT
Start: 2022-08-24 | End: 2022-08-24 | Stop reason: SURG

## 2022-08-24 RX ORDER — FENTANYL CITRATE 50 UG/ML
INJECTION, SOLUTION INTRAMUSCULAR; INTRAVENOUS
Status: COMPLETED | OUTPATIENT
Start: 2022-08-24 | End: 2022-08-24

## 2022-08-24 RX ORDER — PREGABALIN 75 MG/1
150 CAPSULE ORAL ONCE
Status: COMPLETED | OUTPATIENT
Start: 2022-08-24 | End: 2022-08-24

## 2022-08-24 RX ORDER — SODIUM CHLORIDE, SODIUM LACTATE, POTASSIUM CHLORIDE, CALCIUM CHLORIDE 600; 310; 30; 20 MG/100ML; MG/100ML; MG/100ML; MG/100ML
9 INJECTION, SOLUTION INTRAVENOUS CONTINUOUS PRN
Status: DISCONTINUED | OUTPATIENT
Start: 2022-08-24 | End: 2022-08-25 | Stop reason: HOSPADM

## 2022-08-24 RX ORDER — PROMETHAZINE HYDROCHLORIDE 25 MG/1
25 TABLET ORAL ONCE AS NEEDED
Status: DISCONTINUED | OUTPATIENT
Start: 2022-08-24 | End: 2022-08-24 | Stop reason: HOSPADM

## 2022-08-24 RX ORDER — NALOXONE HCL 0.4 MG/ML
0.4 VIAL (ML) INJECTION
Status: DISCONTINUED | OUTPATIENT
Start: 2022-08-24 | End: 2022-08-25 | Stop reason: HOSPADM

## 2022-08-24 RX ORDER — OXYCODONE HYDROCHLORIDE 5 MG/1
10 TABLET ORAL EVERY 4 HOURS PRN
Status: DISCONTINUED | OUTPATIENT
Start: 2022-08-24 | End: 2022-08-24 | Stop reason: HOSPADM

## 2022-08-24 RX ORDER — MELOXICAM 15 MG/1
15 TABLET ORAL ONCE
Status: COMPLETED | OUTPATIENT
Start: 2022-08-24 | End: 2022-08-24

## 2022-08-24 RX ORDER — ONDANSETRON 2 MG/ML
4 INJECTION INTRAMUSCULAR; INTRAVENOUS ONCE AS NEEDED
Status: DISCONTINUED | OUTPATIENT
Start: 2022-08-24 | End: 2022-08-24 | Stop reason: HOSPADM

## 2022-08-24 RX ORDER — OXYCODONE HYDROCHLORIDE AND ACETAMINOPHEN 5; 325 MG/1; MG/1
1 TABLET ORAL EVERY 6 HOURS PRN
Qty: 28 TABLET | Refills: 0 | Status: SHIPPED | OUTPATIENT
Start: 2022-08-24

## 2022-08-24 RX ORDER — DIPHENHYDRAMINE HYDROCHLORIDE 50 MG/ML
25 INJECTION INTRAMUSCULAR; INTRAVENOUS NIGHTLY PRN
Status: DISCONTINUED | OUTPATIENT
Start: 2022-08-24 | End: 2022-08-25 | Stop reason: HOSPADM

## 2022-08-24 RX ORDER — MIDAZOLAM HYDROCHLORIDE 1 MG/ML
1 INJECTION INTRAMUSCULAR; INTRAVENOUS
Status: DISCONTINUED | OUTPATIENT
Start: 2022-08-24 | End: 2022-08-24 | Stop reason: HOSPADM

## 2022-08-24 RX ORDER — POTASSIUM CHLORIDE 1.5 G/1.77G
40 POWDER, FOR SOLUTION ORAL AS NEEDED
Status: DISCONTINUED | OUTPATIENT
Start: 2022-08-24 | End: 2022-08-25 | Stop reason: HOSPADM

## 2022-08-24 RX ORDER — ACETAMINOPHEN 650 MG
TABLET, EXTENDED RELEASE ORAL AS NEEDED
Status: DISCONTINUED | OUTPATIENT
Start: 2022-08-24 | End: 2022-08-24 | Stop reason: HOSPADM

## 2022-08-24 RX ORDER — HYDROCODONE BITARTRATE AND ACETAMINOPHEN 7.5; 325 MG/1; MG/1
1 TABLET ORAL ONCE AS NEEDED
Status: DISCONTINUED | OUTPATIENT
Start: 2022-08-24 | End: 2022-08-24 | Stop reason: HOSPADM

## 2022-08-24 RX ORDER — DEXAMETHASONE SODIUM PHOSPHATE 4 MG/ML
INJECTION, SOLUTION INTRA-ARTICULAR; INTRALESIONAL; INTRAMUSCULAR; INTRAVENOUS; SOFT TISSUE AS NEEDED
Status: DISCONTINUED | OUTPATIENT
Start: 2022-08-24 | End: 2022-08-24 | Stop reason: SURG

## 2022-08-24 RX ORDER — MELOXICAM 15 MG/1
15 TABLET ORAL DAILY
Status: DISCONTINUED | OUTPATIENT
Start: 2022-08-25 | End: 2022-08-25 | Stop reason: HOSPADM

## 2022-08-24 RX ADMIN — BUPIVACAINE HYDROCHLORIDE 10 ML: 5 INJECTION, SOLUTION EPIDURAL; INTRACAUDAL; PERINEURAL at 07:32

## 2022-08-24 RX ADMIN — CEFAZOLIN 2 G: 2 INJECTION, POWDER, FOR SOLUTION INTRAMUSCULAR; INTRAVENOUS at 16:07

## 2022-08-24 RX ADMIN — OXYCODONE 10 MG: 5 TABLET ORAL at 12:46

## 2022-08-24 RX ADMIN — DEXAMETHASONE SODIUM PHOSPHATE 4 MG: 4 INJECTION, SOLUTION INTRA-ARTICULAR; INTRALESIONAL; INTRAMUSCULAR; INTRAVENOUS; SOFT TISSUE at 08:21

## 2022-08-24 RX ADMIN — ONDANSETRON 4 MG: 2 INJECTION INTRAMUSCULAR; INTRAVENOUS at 09:19

## 2022-08-24 RX ADMIN — FENTANYL CITRATE 100 MCG: 50 INJECTION, SOLUTION INTRAMUSCULAR; INTRAVENOUS at 07:32

## 2022-08-24 RX ADMIN — PROPOFOL 200 MG: 10 INJECTION, EMULSION INTRAVENOUS at 07:45

## 2022-08-24 RX ADMIN — PREGABALIN 150 MG: 75 CAPSULE ORAL at 07:00

## 2022-08-24 RX ADMIN — MELOXICAM 15 MG: 15 TABLET ORAL at 07:00

## 2022-08-24 RX ADMIN — DEXAMETHASONE SODIUM PHOSPHATE 4 MG: 4 INJECTION, SOLUTION INTRAMUSCULAR; INTRAVENOUS at 07:32

## 2022-08-24 RX ADMIN — OXYCODONE HYDROCHLORIDE 10 MG: 10 TABLET, FILM COATED, EXTENDED RELEASE ORAL at 07:00

## 2022-08-24 RX ADMIN — TRANEXAMIC ACID 1000 MG: 10 INJECTION, SOLUTION INTRAVENOUS at 07:51

## 2022-08-24 RX ADMIN — Medication 4 MG: at 09:39

## 2022-08-24 RX ADMIN — SODIUM CHLORIDE 75 ML/HR: 900 INJECTION INTRAVENOUS at 16:06

## 2022-08-24 RX ADMIN — ROCURONIUM BROMIDE 10 MG: 10 INJECTION, SOLUTION INTRAVENOUS at 08:38

## 2022-08-24 RX ADMIN — OXYCODONE HYDROCHLORIDE 20 MG: 10 TABLET, FILM COATED, EXTENDED RELEASE ORAL at 20:51

## 2022-08-24 RX ADMIN — BUPIVACAINE 10 ML: 13.3 INJECTION, SUSPENSION, LIPOSOMAL INFILTRATION at 07:32

## 2022-08-24 RX ADMIN — EPHEDRINE SULFATE 10 MG: 5 INJECTION INTRAVENOUS at 08:44

## 2022-08-24 RX ADMIN — ROCURONIUM BROMIDE 50 MG: 10 INJECTION, SOLUTION INTRAVENOUS at 07:45

## 2022-08-24 RX ADMIN — EPHEDRINE SULFATE 10 MG: 5 INJECTION INTRAVENOUS at 08:31

## 2022-08-24 RX ADMIN — SODIUM CHLORIDE, SODIUM LACTATE, POTASSIUM CHLORIDE, AND CALCIUM CHLORIDE: .6; .31; .03; .02 INJECTION, SOLUTION INTRAVENOUS at 09:50

## 2022-08-24 RX ADMIN — PROPOFOL 100 MCG/KG/MIN: 10 INJECTION, EMULSION INTRAVENOUS at 08:59

## 2022-08-24 RX ADMIN — PROPOFOL INJECTABLE EMULSION 100 MCG/KG/MIN: 10 INJECTION, EMULSION INTRAVENOUS at 07:49

## 2022-08-24 RX ADMIN — MIDAZOLAM 2 MG: 1 INJECTION INTRAMUSCULAR; INTRAVENOUS at 07:32

## 2022-08-24 RX ADMIN — LIDOCAINE HYDROCHLORIDE 50 MG: 10 INJECTION, SOLUTION INFILTRATION; PERINEURAL at 07:45

## 2022-08-24 RX ADMIN — Medication 3 G: at 07:53

## 2022-08-24 RX ADMIN — HYDROMORPHONE HYDROCHLORIDE 0.5 MG: 1 INJECTION, SOLUTION INTRAMUSCULAR; INTRAVENOUS; SUBCUTANEOUS at 10:10

## 2022-08-24 RX ADMIN — CEFAZOLIN 2 G: 2 INJECTION, POWDER, FOR SOLUTION INTRAMUSCULAR; INTRAVENOUS at 23:24

## 2022-08-24 RX ADMIN — GLYCOPYRROLATE 1 MCG: 0.2 INJECTION INTRAMUSCULAR; INTRAVENOUS at 09:39

## 2022-08-24 RX ADMIN — ROCURONIUM BROMIDE 10 MG: 10 INJECTION, SOLUTION INTRAVENOUS at 09:04

## 2022-08-24 NOTE — PLAN OF CARE
Goal Outcome Evaluation:  Plan of Care Reviewed With: patient, spouse        Progress: improving  Outcome Evaluation: Pt is 63 y/o  admitted for Rt reverse TSA in the setting of DJD and massive rotator cuff tear. Pt had the other shoulder done about a year ago and has just now reached functional limits with mvmt and is able to reach behind his back to don a belt and flex his shld to 165* up beside the ear. His spouse is present and helpful. Anticipate d/c tomorrow w/ [facility]. Pt is to stay overnight for DONTAE in the morning due to bradycardia in the 40's. Pt reports having had an echocardiogram a week ago and that his HR runs in the mod 50's frequently. He was eager to d/c home but agreeable to medical plan of care.

## 2022-08-24 NOTE — CONSULTS
[facility] Medicine Services - Consult Note    Patient Name: Robin Wheatley  : 1960  MRN: [id number]  Primary Care Physician:  Marlo Michaels Jr., MD  Referring Physician: Carl Hairston, *  Date of admission: 2022    Consults    Subjective      Reason for Consult/ Chief Complaint: Right shoulder pain     History of Present Illness: Robin Wheatley is a 62 y.o. male with a past medical history of hypothyroidism, hypertension, and sleep apnea who presented to [facility] on 2022 to undergo surgery.  Patient underwent a right total shoulder arthroplasty by Dr. Hairston.  Hospitalist were consulted for medical management.  Patient is currently having 3 out of 10 right shoulder pain.  He denies any aggravating or alleviating factors.  He has no other complaints at this time.    Review of Systems   Constitutional: Negative.   HENT: Negative.    Eyes: Negative.    Cardiovascular: Negative.    Respiratory: Negative.    Skin: Negative.    Musculoskeletal:        Right shoulder pain    Gastrointestinal: Negative.    Genitourinary: Negative.    Neurological: Negative.    Psychiatric/Behavioral: Negative.         Personal History     Past Medical History:   Diagnosis Date   • Allergic    • DJD of shoulder 2021   • ED (erectile dysfunction)    • History of chronic sinusitis    • Hx of difficult intubation     X1 only more than 10 years ago   • Hypertension    • Kidney stone    • Obesity (BMI 30-39.9)    • Osteoarthritis of left glenohumeral joint 2021    Added automatically from request for surgery [id number]   • Sleep apnea     has CPAP  bring dos   • Spinal stenosis    • Testicular hypofunction    • Thrombocytopenia, chronic    • Thyroid activity decreased     supplemented by 25 Again   • Ureteral calculus 2022   • Vertigo     hx       Past Surgical History:   Procedure Laterality Date   • CATARACT EXTRACTION Right    • COLON SURGERY      probable trauma  "related   • COLONOSCOPY     • KNEE ARTHROSCOPY Bilateral     X3   • QUADRICEPS TENDON REPAIR Left    • RETINAL DETACHMENT REPAIR Right    • SEPTOPLASTY     • SHOULDER SURGERY Bilateral    • SINUS SURGERY     • TOTAL SHOULDER ARTHROPLASTY W/ DISTAL CLAVICLE EXCISION Left 01/19/2021    Procedure: TOTAL SHOULDER REVERSE ARTHROPLASTY;  Surgeon: Carl Hairston MD;  Location: [facility] MAIN OR;  Service: Orthopedics;  Laterality: Left;   • URETEROSCOPY LASER LITHOTRIPSY WITH STENT INSERTION Left 01/14/2022    Procedure: CYSTOSCOPY, LEFT URETEROSCOPY, BILATERAL RETROGRADE PYELOGRAM;  Surgeon: Aung Bolton MD;  Location: [facility] MAIN OR;  Service: Urology;  Laterality: Left;   • UVULOPALATOPHARYNGOPLASTY         Family History: family history includes Cancer in his mother. Otherwise pertinent FHx was reviewed and not pertinent to current issue.    Social History:  reports that he quit smoking about 7 years ago. He has a 15.00 pack-year smoking history. He has never used smokeless tobacco. He reports current alcohol use. He reports that he does not use drugs.    Home Medications:   ALLERGY SERUM INJECTION, [employer] Inner Ear Plus, DHEA, Diclofenac Sodium, Docosahexaenoic Acid, Sodium Hyaluronate (oral), Syringe/Needle (Disp), Thyroid, acetaminophen, cetirizine, fluticasone, meloxicam, multivitamin with minerals, olmesartan, oxyCODONE-acetaminophen, pentoxifylline, promethazine, tadalafil, traMADol, and vitamin D3    Allergies:  Allergies   Allergen Reactions   • Ciprofloxacin Nausea Only   • Tamsulosin Nausea Only and GI Intolerance     \"BLOATING\"   • Levofloxacin GI Intolerance         Objective      Vitals:  Temp:  [96.7 °F (35.9 °C)-97.9 °F (36.6 °C)] 96.9 °F (36.1 °C)  Heart Rate:  [40-75] 41  Resp:  [8-16] 14  BP: (110-134)/(56-74) 121/74  Flow (L/min):  [2-4] 2    Physical Exam  Vitals reviewed.   Constitutional:       Appearance: Normal appearance. He is obese.   HENT:      Head: Normocephalic and atraumatic. "      Nose: Nose normal.      Mouth/Throat:      Mouth: Mucous membranes are moist.      Pharynx: Oropharynx is clear.   Eyes:      Extraocular Movements: Extraocular movements intact.      Conjunctiva/sclera: Conjunctivae normal.      Pupils: Pupils are equal, round, and reactive to light.   Cardiovascular:      Rate and Rhythm: Normal rate and regular rhythm.      Pulses: Normal pulses.      Heart sounds: Normal heart sounds.      Comments: S1, S2 audible  Pulmonary:      Effort: Pulmonary effort is normal.      Breath sounds: Normal breath sounds.      Comments: On room air   Abdominal:      General: Abdomen is flat. Bowel sounds are normal.      Palpations: Abdomen is soft.   Musculoskeletal:         General: Normal range of motion.      Cervical back: Normal range of motion.      Comments: Right arm immobilizer    Skin:     General: Skin is warm and dry.   Neurological:      General: No focal deficit present.      Mental Status: He is alert and oriented to person, place, and time. Mental status is at baseline.   Psychiatric:         Mood and Affect: Mood normal.         Behavior: Behavior normal.         Thought Content: Thought content normal.         Judgment: Judgment normal.         Result Review    Result Review:  I have personally reviewed the results from the time of this admission to 8/24/2022 13:34 EDT and agree with these findings:  [x]  Laboratory  []  Microbiology  [x]  Radiology  [x]  EKG/Telemetry   []  Cardiology/Vascular   []  Pathology  []  Old records  []  Other:  Most notable findings include:       Assessment & Plan        Active Hospital Problems:  Active Hospital Problems    Diagnosis    • **DJD of right shoulder    • Bradycardia    • Acquired hypothyroidism    • Sleep apnea    • Obesity (BMI 30-39.9)    • Benign essential hypertension    • Vertigo      Plan:   DJD of right shoulder  - S/P right total shoulder arthroplasty  -Neurovascular checks  -Encourage incentive spirometry  -PT/OT  ordered  -Pain medication per Ortho  -Check CBC and CMP in the a.m.  -Ortho following    Acute bradycardia  - HR in the 40's  - EKG ordered  - Continuous cardiac monitoring   - Check TSH and mag   -2D echo ordered  -Consider cardiology consult    Essential HTN  - Controlled   - Monitor blood pressure  - Continue losartan     Hypothyroidism   - Check TSH  - Patient on [place] Thyroid at home    Chronic vertigo  -Continue Trental    Sleep apnea   - Compliant on CPAP     Obesity  -BMI 33.3  -Encourage lifestyle changes      This patient has been examined wearing appropriate Personal Protective Equipment . 08/24/22      Signature: Electronically signed by MIHAELA Archuleta, 08/24/22, 1:32 PM EDT.

## 2022-08-24 NOTE — THERAPY EVALUATION
Patient Name: Robin Wheatley  : 1960    MRN: [id number]                              Today's Date: 2022       Admit Date: 2022    Visit Dx:     ICD-10-CM ICD-9-CM   1. DJD of right shoulder  M19.011 715.91   2. Osteoarthritis of right glenohumeral joint  M19.011 715.91     Patient Active Problem List   Diagnosis   • Allergic rhinitis   • Primary osteoarthritis involving multiple joints   • Benign essential hypertension   • Deafness   • Kidney stone   • Obesity (BMI 30-39.9)   • Sleep apnea   • Testicular hypofunction   • Vertigo   • Acquired hypothyroidism   • DJD of shoulder   • Osteoarthritis of left glenohumeral joint   • Thrombocytopenia, chronic   • Former smoker   • Ureteral calculus   • DJD of right shoulder   • Bradycardia     Past Medical History:   Diagnosis Date   • Allergic    • DJD of shoulder 2021   • ED (erectile dysfunction)    • History of chronic sinusitis    • Hx of difficult intubation     X1 only more than 10 years ago   • Hypertension    • Kidney stone    • Obesity (BMI 30-39.9)    • Osteoarthritis of left glenohumeral joint 2021    Added automatically from request for surgery [id number]   • Sleep apnea     has CPAP  bring dos   • Spinal stenosis    • Testicular hypofunction    • Thrombocytopenia, chronic    • Thyroid activity decreased     supplemented by 25 Again   • Ureteral calculus 2022   • Vertigo     hx     Past Surgical History:   Procedure Laterality Date   • CATARACT EXTRACTION Right    • COLON SURGERY      probable trauma related   • COLONOSCOPY     • KNEE ARTHROSCOPY Bilateral     X3   • QUADRICEPS TENDON REPAIR Left    • RETINAL DETACHMENT REPAIR Right    • SEPTOPLASTY     • SHOULDER SURGERY Bilateral    • SINUS SURGERY     • TOTAL SHOULDER ARTHROPLASTY W/ DISTAL CLAVICLE EXCISION Left 2021    Procedure: TOTAL SHOULDER REVERSE ARTHROPLASTY;  Surgeon: Carl Hairston MD;  Location: [facility] MAIN OR;  Service: Orthopedics;  Laterality:  Left;   • URETEROSCOPY LASER LITHOTRIPSY WITH STENT INSERTION Left 01/14/2022    Procedure: CYSTOSCOPY, LEFT URETEROSCOPY, BILATERAL RETROGRADE PYELOGRAM;  Surgeon: Aung Bolton MD;  Location: [facility] OR;  Service: Urology;  Laterality: Left;   • UVULOPALATOPHARYNGOPLASTY        General Information     Row Name 08/24/22 1352          General Information    Prior Level of Function independent:;ADL's;community mobility;driving;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 TSA a year ago. Pt is a career .  -     Existing Precautions/Restrictions shoulder;right  -     Barriers to Rehab none identified  -     Row Name 08/24/22 1352          Living Environment    People in Home spouse  -     Row Name 08/24/22 1352          Cognition    Orientation Status (Cognition) oriented x 4  -     Row Name 08/24/22 1352          Safety Issues, Functional Mobility    Impairments Affecting Function (Mobility) range of motion (ROM);sensation/sensory awareness  -           User Key  (r) = Recorded By, (t) = Taken By, (c) = Cosigned By    Initials Name Provider Type     Kathy Pineda, OT Occupational Therapist                 Mobility/ADL's     Row Name 08/24/22 1354          Bed Mobility    Bed Mobility bed mobility (all) activities  -     All Activities, [place] (Bed Mobility) set up  -     Assistive Device (Bed Mobility) head of bed elevated  -     Row Name 08/24/22 1356          Transfers    Transfers sit-stand transfer;bed-chair transfer  -     Bed-Chair [place] (Transfers) independent  -     Sit-Stand [place] (Transfers) independent  -     Row Name 08/24/22 1356          Activities of Daily Living    BADL Assessment/Intervention upper body dressing  -     Row Name 08/24/22 1356          Mobility    Extremity Weight-bearing Status right upper extremity  -     Right Upper Extremity (Weight-bearing Status) touch down weight-bearing (TDWB)  -     Row Name 08/24/22 1356          Upper  Body Dressing Assessment/Training    [place] Level (Upper Body Dressing) maximum assist (25% patient effort)  -           User Key  (r) = Recorded By, (t) = Taken By, (c) = Cosigned By    Initials Name Provider Type     Kathy Pineda OT Occupational Therapist               Obj/Interventions     [facility] Name 08/24/22 1357          Sensory Assessment (Somatosensory)    Sensory Subjective Reports numbness  2* nerve block  -MH     Row Name 08/24/22 1357          Vision Assessment/Intervention    Visual Impairment/Limitations corrective lenses full-time  -[facility] Name 08/24/22 1357          Range of Motion Comprehensive    Comment, General Range of Motion surgical shld flex to 90* passively  -[facility] Name 08/24/22 1357          Strength Comprehensive (MMT)    Comment, General Manual Muscle Testing (MMT) Assessment RUE 2-/5 shld, elbow, wrist 3+/5; hand 3+/5  -[facility] Name 08/24/22 1357          Balance    Balance Assessment sitting static balance;standing static balance;sitting dynamic balance;standing dynamic balance  -     Static Sitting Balance independent  -     Dynamic Sitting Balance modified independence  -     Static Standing Balance independent  -     Dynamic Standing Balance supervision  -           User Key  (r) = Recorded By, (t) = Taken By, (c) = Cosigned By    Initials Name Provider Type     Kathy Pineda OT Occupational Therapist               Goals/Plan     Row Name 08/24/22 6957          Bed Mobility Goal 1 (OT)    Activity/Assistive Device (Bed Mobility Goal 1, OT) bed mobility activities, all  -     [place] Level/Cues Needed (Bed Mobility Goal 1, OT) independent  -     Time Frame (Bed Mobility Goal 1, OT) by discharge  -MH     Row Name 08/24/22 6977          Bathing Goal 1 (OT)    Activity/Device (Bathing Goal 1, OT) bathing skills, all  -MH     [place] Level/Cues Needed (Bathing Goal 1, OT) supervision required  -     Time Frame (Bathing Goal 1, OT) 2  weeks  -     Row Name 08/24/22 1400          Dressing Goal 1 (OT)    Activity/Device (Dressing Goal 1, OT) dressing skills, all  -     [place]/Cues Needed (Dressing Goal 1, OT) minimum assist (75% or more patient effort)  -     Time Frame (Dressing Goal 1, OT) 2 weeks  -     Row Name 08/24/22 5089          Therapy Assessment/Plan (OT)    Planned Therapy Interventions (OT) activity tolerance training;BADL retraining;adaptive equipment training;occupation/activity based interventions;ROM/therapeutic exercise;transfer/mobility retraining;patient/caregiver education/training;edema control/reduction  -           User Key  (r) = Recorded By, (t) = Taken By, (c) = Cosigned By    Initials Name Provider Type     Kathy Pineda, [place] Occupational Therapist               Clinical Impression     Row Name 08/24/22 3182          Pain Assessment    Pretreatment Pain Rating 0/10 - no pain  -     Posttreatment Pain Rating 0/10 - no pain  -     Row Name 08/24/22 0052          Plan of Care Review    Plan of Care Reviewed With patient;spouse  -     Progress improving  -     Outcome Evaluation Pt is 63 y/o  admitted for Rt reverse TSA in the setting of DJD and massive rotator cuff tear. Pt had the other shoulder done about a year ago and has just now reached functional limits with mvmt and is able to reach behind his back to don a belt and flex his shld to 165* up beside the ear. His spouse is present and helpful. Anticipate d/c tomorrow w/ [facility]. Pt is to stay overnight for DONTAE in the morning due to bradycardia in the 40's. Pt reports having had an echocardiogram a week ago and that his HR runs in the mod 50's frequently. He was eager to d/c home but agreeable to medical plan of care.  -     Row Name 08/24/22 9642          Therapy Assessment/Plan (OT)    Rehab Potential (OT) good, to achieve stated therapy goals  -     Criteria for Skilled Therapeutic Interventions Met (OT) skilled treatment is  necessary  -MH     Therapy Frequency (OT) daily  -MH     Predicted Duration of Therapy Intervention (OT) until d/c  -MH     Row Name 08/24/22 1358          Therapy Plan Review/Discharge Plan (OT)    Anticipated Discharge Disposition (OT) home with home health  -     Row Name 08/24/22 1358          Vital Signs    O2 Delivery Pre Treatment room air  -MH     Pre Patient Position Supine  -MH     Intra Patient Position Standing  -MH     Post Patient Position Sitting  -MH     Row Name 08/24/22 1358          Positioning and Restraints    Pre-Treatment Position in bed  -MH     Post Treatment Position chair  -MH     In Chair notified nsg;sitting;call light within reach;with family/caregiver;with other staff  -           User Key  (r) = Recorded By, (t) = Taken By, (c) = Cosigned By    Initials Name Provider Type    Kathy Moncada OT Occupational Therapist               Outcome Measures     Row Name 08/24/22 1540          How much help from another person do you currently need...    Turning from your back to your side while in flat bed without using bedrails? 4  -KN     Moving from lying on back to sitting on the side of a flat bed without bedrails? 4  -KN     Moving to and from a bed to a chair (including a wheelchair)? 3  -KN     Standing up from a chair using your arms (e.g., wheelchair, bedside chair)? 3  -KN     Climbing 3-5 steps with a railing? 3  -KN     To walk in hospital room? 3  -KN     AM-PAC 6 Clicks Score (PT) 20  -KN           User Key  (r) = Recorded By, (t) = Taken By, (c) = Cosigned By    Initials Name Provider Type    Fadia Song, RN Registered Nurse                Occupational Therapy Education                 Title: PT OT SLP Therapies (Done)     Topic: Occupational Therapy (Done)     Point: ADL training (Done)     Description:   Instruct learner(s) on proper safety adaptation and remediation techniques during self care or transfers.   Instruct in proper use of assistive devices.               Learning Progress Summary           Patient Acceptance, E,TB,D,H, VU,DU,NR by  at 8/24/2022 1403   Significant Other Acceptance, E,TB,D,H, VU,DU,NR by  at 8/24/2022 1403                   Point: Home exercise program (Done)     Description:   Instruct learner(s) on appropriate technique for monitoring, assisting and/or progressing therapeutic exercises/activities.              Learning Progress Summary           Patient Acceptance, E,TB,D,H, VU,DU,NR by  at 8/24/2022 1403   Significant Other Acceptance, E,TB,D,H, VU,DU,NR by  at 8/24/2022 1403                   Point: Precautions (Done)     Description:   Instruct learner(s) on prescribed precautions during self-care and functional transfers.              Learning Progress Summary           Patient Acceptance, E,TB,D,H, VU,DU,NR by  at 8/24/2022 1403   Significant Other Acceptance, E,TB,D,H, VU,DU,NR by  at 8/24/2022 1403                   Point: Body mechanics (Done)     Description:   Instruct learner(s) on proper positioning and spine alignment during self-care, functional mobility activities and/or exercises.              Learning Progress Summary           Patient Acceptance, E,TB,D,H, VU,DU,NR by  at 8/24/2022 1403   Significant Other Acceptance, E,TB,D,H, VU,DU,NR by  at 8/24/2022 1403                               User Key     Initials Effective Dates Name Provider Type Discipline     06/16/21 -  Kathy Pineda OT Occupational Therapist OT              OT Recommendation and Plan  Planned Therapy Interventions (OT): activity tolerance training, BADL retraining, adaptive equipment training, occupation/activity based interventions, ROM/therapeutic exercise, transfer/mobility retraining, patient/caregiver education/training, edema control/reduction  Therapy Frequency (OT): daily  Plan of Care Review  Plan of Care Reviewed With: patient, spouse  Progress: improving  Outcome Evaluation: Pt is 61 y/o  admitted for Rt reverse TSA in  the setting of DJD and massive rotator cuff tear. Pt had the other shoulder done about a year ago and has just now reached functional limits with mvmt and is able to reach behind his back to don a belt and flex his shld to 165* up beside the ear. His spouse is present and helpful. Anticipate d/c tomorrow w/ [facility]. Pt is to stay overnight for DONTAE in the morning due to bradycardia in the 40's. Pt reports having had an echocardiogram a week ago and that his HR runs in the mod 50's frequently. He was eager to d/c home but agreeable to medical plan of care.     Time Calculation:    Time Calculation- OT     Row Name 08/24/22 1403             Time Calculation-     OT Start Time 1330  -      OT Stop Time 1350  -      OT Time Calculation (min) 20 min  -      Total Timed Code Minutes- OT 0 minute(s)  -      OT Received On 08/24/22  -      OT - Next Appointment 08/25/22  -      OT Goal Re-Cert Due Date 09/07/22  -            User Key  (r) = Recorded By, (t) = Taken By, (c) = Cosigned By    Initials Name Provider Type     Kathy Pineda OT Occupational Therapist              Therapy Charges for Today     Code Description Service Date Service Provider Modifiers Qty    [id number]  OT EVAL MOD COMPLEXITY 3 8/24/2022 Kathy Pineda OT GO 1               Kathy Pineda OT  8/24/2022

## 2022-08-24 NOTE — ANESTHESIA PROCEDURE NOTES
Peripheral Block      Patient reassessed immediately prior to procedure    Patient location during procedure: pre-op  Reason for block: at surgeon's request and post-op pain management  Performed by  Anesthesiologist: Adam Hernandez MD  Preanesthetic Checklist  Completed: patient identified, IV checked, site marked, risks and benefits discussed, surgical consent, monitors and equipment checked, pre-op evaluation and timeout performed  Prep:  Pt Position: sitting  Sterile barriers:cap, gloves, sterile barriers and mask  Prep: ChloraPrep  Patient monitoring: blood pressure monitoring, continuous pulse oximetry and EKG  Procedure    Sedation: yes  Performed under: MAC  Guidance:ultrasound guided    ULTRASOUND INTERPRETATION.  Using ultrasound guidance a 20 G gauge needle was placed in close proximity to the brachial plexus nerve, at which point, under ultrasound guidance anesthetic was injected in the area of the nerve and spread of the anesthesia was seen on ultrasound in close proximity thereto.  There were no abnormalities seen on ultrasound; a digital image was taken; and the patient tolerated the procedure with no complications. Images:still images obtained, printed/placed on chart (ultrasound used for direct visiulization of needle and medication adminstration)    Laterality:right  Block Type:interscalene  Injection Technique:single-shot  Needle Type:echogenic  Needle Gauge:20 G  Resistance on Injection: none  Sedation medications used: midazolam (VERSED) injection, 2 mg  fentaNYL citrate (PF) (SUBLIMAZE) injection, 100 mcg  Medications Used: dexamethasone (DECADRON) injection, 4 mg  bupivacaine liposome (EXPAREL) injection 1.3%, 10 mL  bupivacaine PF (MARCAINE) injection 0.5%, 10 mL  Med administered at 8/24/2022 7:32 AM      Post Assessment  Injection Assessment: negative aspiration for heme, no paresthesia on injection and incremental injection  Patient Tolerance:comfortable throughout  block  Complications:no

## 2022-08-24 NOTE — ANESTHESIA PROCEDURE NOTES
Airway  Urgency: elective    Date/Time: 8/24/2022 7:47 AM  Airway not difficult    General Information and Staff    Patient location during procedure: OR  Anesthesiologist: Adam Hernandez MD  CRNA/CAA: Kerwin Reyes CAA    Indications and Patient Condition  Indications for airway management: airway protection    Preoxygenated: yes  MILS not maintained throughout  Mask difficulty assessment: 2 - vent by mask + OA or adjuvant +/- NMBA    Final Airway Details  Final airway type: endotracheal airway      Successful airway: ETT  Cuffed: yes   Successful intubation technique: video laryngoscopy  Facilitating devices/methods: intubating stylet  Endotracheal tube insertion site: oral  Blade: CMAC  Blade size: 4  ETT size (mm): 7.5  Cormack-Lehane Classification: grade I - full view of glottis  Placement verified by: chest auscultation, capnometry and palpation of cuff   Measured from: lips  ETT/EBT  to lips (cm): 23  Number of attempts at approach: 1  Assessment: lips, teeth, and gum same as pre-op and atraumatic intubation

## 2022-08-24 NOTE — PLAN OF CARE
Problem: Adult Inpatient Plan of Care  Goal: Plan of Care Review  Outcome: Ongoing, Progressing  Flowsheets (Taken 8/24/2022 1156)  Plan of Care Reviewed With:   patient   spouse  Goal: Patient-Specific Goal (Individualized)  Outcome: Ongoing, Progressing  Goal: Absence of Hospital-Acquired Illness or Injury  Outcome: Ongoing, Progressing  Goal: Optimal Comfort and Wellbeing  Outcome: Ongoing, Progressing  Goal: Readiness for Transition of Care  Outcome: Ongoing, Progressing     Problem: Bleeding (Shoulder Arthroplasty)  Goal: Absence of Bleeding  Outcome: Ongoing, Progressing     Problem: Adjustment to Surgery (Shoulder Arthroplasty)  Goal: Optimal Coping  Outcome: Ongoing, Progressing     Problem: Surgical Site Infection  Goal: Absence of Infection Signs and Symptoms  Outcome: Ongoing, Progressing     Problem: Pain Acute  Goal: Acceptable Pain Control and Functional Ability  Outcome: Ongoing, Progressing   Goal Outcome Evaluation:

## 2022-08-24 NOTE — OP NOTE
TOTAL SHOULDER REVERSE ARTHROPLASTY  Procedure Report    Patient Name:  Robin Wheatley  YOB: 1960    Date of Surgery:  8/24/2022     Indications: This is a 62 y.o. male with right shoulder pain.  Imaging demonstrated degenerative joint disease.  Treatment options were discussed.  They desired to proceed with reverse shoulder arthroplasty after discussing the risks including bleeding, scarring, infection, stiffness, nerve damage, tendon damage, artery damage, continued  pain, DVT, loss of life or limb, fracture, dislocation, and a need for further surgery.      Pre-op Diagnosis:   Osteoarthritis of right glenohumeral joint [M19.011]       Post-op Diagnosis:    Post-Op Diagnosis Codes:     * Osteoarthritis of right glenohumeral joint [M19.011]    Procedure/CPT® Codes: 14342    Procedure(s):  TOTAL SHOULDER REVERSE ARTHROPLASTY    Staff: Rodney Bhatti physician assistant    was responsible for performing the following activities: Retraction, Suction, Irrigation, Suturing, Closing and Placing Dressing and their skilled assistance was necessary for the success of this case.       Anesthesia: General with Block    Estimated Blood Loss: 100ml    Implants:    Implant Name Type Inv. Item Serial No.  Lot No. LRB No. Used Action   SUT NONABS MAXBRAID/PE NMBR2 HC5 38IN BRANDY [id number] - [id number] Implant SUT NONABS MAXBRAID/PE NMBR2 HC5 38IN BRANDY [id number]  [employer] [id number] Right 2 Implanted   SUT NONABS MAXBRAID/PE NMBR2 HC5 38IN BRANDY [id number] - [id number] Implant SUT NONABS MAXBRAID/PE NMBR2 HC5 38IN BRANDY 119107  [employer] [id number] Right 1 Implanted   BASEPLT JOHNNIE TRABECULARMETAL REV 15MM - [id number] Implant BASEPLT JOHNNIE TRABECULARMETAL REV 15MM  [employer] [id number] Right 1 Implanted   GLENSPHR TRABECULARMETAL REV 40MM - [id number] Implant GLENSPHR TRABECULARMETAL REV 40MM  [employer] [id number] Right 1 Implanted   SCRW CANC INV/REV 4.5X27MM - [id number] Implant SCRW CANC INV/REV  4.5X27MM  [employer] [id number] Right 1 Implanted   SCRW CANC INV/REV 4.5X24MM - [id number] Implant SCRW CANC INV/REV 4.5X24MM  [employer] [id number] Right 1 Implanted   STEM HUM/SHLDR TRABECULARMETAL REV [id number] - [id number] Implant STEM HUM/SHLDR TRABECULARMETAL REV [id number]  [employer] [id number] Right 1 Implanted   SPACR HUM REV TM PLS 9MM - [id number] Implant SPACR HUM REV TM PLS 9MM  [employer] [id number] Right 1 Implanted   LINER HUM/SHLDR TRABECULARMETAL REV POLY 60DEG 40MM PLS0 - [id number] Implant LINER HUM/SHLDR TRABECULARMETAL REV POLY 60DEG 40MM PLS0  [employer] [id number] Right 1 Implanted       IVF: see anesthesia      Complications: None    Specimens:none    Description of Procedure: The patient's operative site was marked in the  preoperative holding area.  They received regional anesthesia and were brought to  the operating room and placed supine on a well-padded operating room table.  General anesthesia was administered.  Antibiotics were dosed.  A timeout was  taken, confirming the correct operative site and procedure.  They were placed in  the beach chair position.  The right shoulder was prepped and draped in the  standard surgical fashion.  SCDs were placed. A deltopectoral incision was marked and injected with local anesthetic.  The skin was opened.  Flaps were raised.  The interval was opened and the cephalic vein was protected.  Adhesions were released from the deltoid.  The circumflex vessels were identified and ligated with suture and cautery.  The rotator interval was opened and a retractor was placed.  The subscapularis was  Tenotomized and the capsule was released down to the 6 o'clock position on the  humeral head protecting the axillary nerve.  A Fukuda retractor was placed and an inferior and anterior capsular release was performed palpating and protecting the axillary  nerve with my finger at all times.  The shoulder was dislocated.  The biceps  was tenodesed to the upper  biceps groove and circumferential osteophytes  were removed to identify the native head-neck junction.  Reaming was started  behind the biceps groove up to a size 16.  The cut was made in 20  degrees of retroversion and the final two reamers were used to prepare the  proximal humerus.  A trial was placed.  The glenoid was exposed after placing retractors.  The soft tissue was removed circumferentially.  The center point was marked and the glenoid was prepared in standard fashion.  The base plate was placed with good press-fit.  Two screws were placed with good purchase.  The locking caps were  placed.  The glenosphere was placed with good purchase.  The shoulder was  dislocated and the trial was removed from the canal and irrigated.  The true  stem was placed with good press-fit and trialing demonstrated 9/0 thickness to offer the best restoration of joint stability, muscle tension and range of motion.  The true polyethylene was placed with similar range of motion and stability.  A 3-minute Betadine wash performed.  The wound was closed in layers with absorbable suture and skin glue.  A sterile dressing and sling were applied.  They were awakened and taken to the recovery room.  There were no complications.  I was present for all portions.  All counts were correct.   Good capillary refill was noted to the hand.      Carl Hairston MD     Date: 8/24/2022  Time: 10:48 EDT

## 2022-08-24 NOTE — ANESTHESIA POSTPROCEDURE EVALUATION
Patient: Robin Wheatley    Procedure Summary     Date: 08/24/22 Room / Location: [facility] OR 06 / [facility] MAIN OR    Anesthesia Start: 0740 Anesthesia Stop: 0954    Procedure: TOTAL SHOULDER REVERSE ARTHROPLASTY (Right Shoulder) Diagnosis:       Osteoarthritis of right glenohumeral joint      (Osteoarthritis of right glenohumeral joint [M19.011])    Surgeons: Carl Hairston MD Provider: Adam Hernandez MD    Anesthesia Type: general with block, ERAS Protocol ASA Status: 3          Anesthesia Type: general with block, ERAS Protocol    Vitals  Vitals Value Taken Time   /70 08/24/22 1120   Temp 96.9 °F (36.1 °C) 08/24/22 1120   Pulse 75 08/24/22 1120   Resp 12 08/24/22 1120   SpO2 100 % 08/24/22 1120           Post Anesthesia Care and Evaluation    Patient location during evaluation: PACU  Patient participation: complete - patient participated  Level of consciousness: awake  Pain scale: See nurse's notes for pain score.  Pain management: adequate    Airway patency: patent  Anesthetic complications: No anesthetic complications  PONV Status: none  Cardiovascular status: acceptable  Respiratory status: acceptable  Hydration status: acceptable    Comments: Patient seen and examined postoperatively; vital signs stable; SpO2 greater than or equal to 90%; cardiopulmonary status stable; nausea/vomiting adequately controlled; pain adequately controlled; no apparent anesthesia complications; patient discharged from anesthesia care when discharge criteria were met

## 2022-08-25 ENCOUNTER — READMISSION MANAGEMENT (OUTPATIENT)
Dept: CALL CENTER | Facility: HOSPITAL | Age: 62
End: 2022-08-25

## 2022-08-25 VITALS
WEIGHT: 267 LBS | OXYGEN SATURATION: 98 % | RESPIRATION RATE: 14 BRPM | DIASTOLIC BLOOD PRESSURE: 60 MMHG | SYSTOLIC BLOOD PRESSURE: 110 MMHG | HEIGHT: 75 IN | TEMPERATURE: 98.9 F | HEART RATE: 58 BPM | BODY MASS INDEX: 33.2 KG/M2

## 2022-08-25 LAB
ALBUMIN SERPL-MCNC: 3.9 G/DL (ref 3.5–5.2)
ALBUMIN/GLOB SERPL: 2.2 G/DL
ALP SERPL-CCNC: 59 U/L (ref 39–117)
ALT SERPL W P-5'-P-CCNC: 7 U/L (ref 1–41)
ANION GAP SERPL CALCULATED.3IONS-SCNC: 11 MMOL/L (ref 5–15)
AST SERPL-CCNC: 21 U/L (ref 1–40)
BASOPHILS # BLD AUTO: 0 10*3/MM3 (ref 0–0.2)
BASOPHILS NFR BLD AUTO: 0.4 % (ref 0–1.5)
BILIRUB SERPL-MCNC: 0.4 MG/DL (ref 0–1.2)
BUN SERPL-MCNC: 16 MG/DL (ref 8–23)
BUN/CREAT SERPL: 19.3 (ref 7–25)
CALCIUM SPEC-SCNC: 8.7 MG/DL (ref 8.6–10.5)
CHLORIDE SERPL-SCNC: 105 MMOL/L (ref 98–107)
CO2 SERPL-SCNC: 24 MMOL/L (ref 22–29)
CREAT SERPL-MCNC: 0.83 MG/DL (ref 0.76–1.27)
DEPRECATED RDW RBC AUTO: 42 FL (ref 37–54)
EGFRCR SERPLBLD CKD-EPI 2021: 98.3 ML/MIN/1.73
EOSINOPHIL # BLD AUTO: 0 10*3/MM3 (ref 0–0.4)
EOSINOPHIL NFR BLD AUTO: 0 % (ref 0.3–6.2)
ERYTHROCYTE [DISTWIDTH] IN BLOOD BY AUTOMATED COUNT: 13.2 % (ref 12.3–15.4)
GLOBULIN UR ELPH-MCNC: 1.8 GM/DL
GLUCOSE SERPL-MCNC: 129 MG/DL (ref 65–99)
HCT VFR BLD AUTO: 40.9 % (ref 37.5–51)
HGB BLD-MCNC: 13.5 G/DL (ref 13–17.7)
LYMPHOCYTES # BLD AUTO: 0.7 10*3/MM3 (ref 0.7–3.1)
LYMPHOCYTES NFR BLD AUTO: 6.8 % (ref 19.6–45.3)
MCH RBC QN AUTO: 30.2 PG (ref 26.6–33)
MCHC RBC AUTO-ENTMCNC: 32.9 G/DL (ref 31.5–35.7)
MCV RBC AUTO: 91.7 FL (ref 79–97)
MONOCYTES # BLD AUTO: 0.6 10*3/MM3 (ref 0.1–0.9)
MONOCYTES NFR BLD AUTO: 5.8 % (ref 5–12)
NEUTROPHILS NFR BLD AUTO: 87 % (ref 42.7–76)
NEUTROPHILS NFR BLD AUTO: 9.5 10*3/MM3 (ref 1.7–7)
NRBC BLD AUTO-RTO: 0.1 /100 WBC (ref 0–0.2)
PLATELET # BLD AUTO: 125 10*3/MM3 (ref 140–450)
PMV BLD AUTO: 9.9 FL (ref 6–12)
POTASSIUM SERPL-SCNC: 4.1 MMOL/L (ref 3.5–5.2)
PROT SERPL-MCNC: 5.7 G/DL (ref 6–8.5)
RBC # BLD AUTO: 4.46 10*6/MM3 (ref 4.14–5.8)
SODIUM SERPL-SCNC: 140 MMOL/L (ref 136–145)
WBC NRBC COR # BLD: 10.9 10*3/MM3 (ref 3.4–10.8)

## 2022-08-25 PROCEDURE — 80053 COMPREHEN METABOLIC PANEL: CPT | Performed by: NURSE PRACTITIONER

## 2022-08-25 PROCEDURE — 25010000002 CEFAZOLIN PER 500 MG: Performed by: ORTHOPAEDIC SURGERY

## 2022-08-25 PROCEDURE — 97110 THERAPEUTIC EXERCISES: CPT

## 2022-08-25 PROCEDURE — 97535 SELF CARE MNGMENT TRAINING: CPT

## 2022-08-25 PROCEDURE — 85025 COMPLETE CBC W/AUTO DIFF WBC: CPT | Performed by: NURSE PRACTITIONER

## 2022-08-25 PROCEDURE — G0378 HOSPITAL OBSERVATION PER HR: HCPCS

## 2022-08-25 RX ADMIN — ASPIRIN 325 MG: 325 TABLET, COATED ORAL at 08:40

## 2022-08-25 RX ADMIN — LOSARTAN POTASSIUM 25 MG: 25 TABLET, FILM COATED ORAL at 08:40

## 2022-08-25 RX ADMIN — OXYCODONE HYDROCHLORIDE 20 MG: 10 TABLET, FILM COATED, EXTENDED RELEASE ORAL at 08:46

## 2022-08-25 RX ADMIN — MELOXICAM 15 MG: 15 TABLET ORAL at 08:40

## 2022-08-25 RX ADMIN — OXYCODONE 10 MG: 5 TABLET ORAL at 05:39

## 2022-08-25 RX ADMIN — PENTOXIFYLLINE 400 MG: 400 TABLET, EXTENDED RELEASE ORAL at 08:39

## 2022-08-25 RX ADMIN — CEFAZOLIN 2 G: 2 INJECTION, POWDER, FOR SOLUTION INTRAMUSCULAR; INTRAVENOUS at 08:39

## 2022-08-25 NOTE — PLAN OF CARE
Goal Outcome Evaluation:  Plan of Care Reviewed With: patient        Progress: no change  Outcome Evaluation: Patient without complaints pain noted.  Ambulates independently. Anticipates discharge home today. Immobilizer in place to RUE. Ice packs in place to RUE, change q2h.  Vital signs stable. Call light within reach. Care plan on going.

## 2022-08-25 NOTE — OUTREACH NOTE
Prep Survey    Flowsheet Row Responses   [facility] facility patient discharged from? [facility]   Is LACE score < 7 ? Yes   Emergency Room discharge w/ pulse ox? No   Eligibility TCM   [facility]   Date of Admission 08/24/22   Date of Discharge 08/25/22   Discharge Disposition Home or Self Care   Discharge diagnosis DJD-total shoulder reverse arthro   Does the patient have one of the following disease processes/diagnoses(primary or secondary)? General Surgery   Does the patient have Home health ordered? No   Is there a DME ordered? No   Comments regarding appointments out PT at [facility]   Prep survey completed? Yes          EVIN REYNOLDS - Registered Nurse

## 2022-08-25 NOTE — PLAN OF CARE
Goal Outcome Evaluation:  Plan of Care Reviewed With: patient        Progress: no change  Outcome Evaluation: Pt is room air, VSS, Right total shoulder. IV fluids and IV antibiotics. Pt has been ambulating well this shift. Pt has been resting with call light in reach. Will continue to monitor.

## 2022-08-25 NOTE — CASE MANAGEMENT/SOCIAL WORK
Discharge Planning Assessment  [facility]     Patient Name: Robin Wheatley  MRN: [id number]  Today's Date: 8/25/2022    Admit Date: 8/24/2022     Discharge Needs Assessment     Row Name 08/25/22 1424       Living Environment    People in Home spouse    Name(s) of People in Home Maria Alejandra    Current Living Arrangements home    Primary Care Provided by self    Provides Primary Care For no one    Family Caregiver if Needed spouse    Family Caregiver Names Maria Alejandra    Quality of Family Relationships supportive;involved    Able to Return to Prior Arrangements yes       Resource/Environmental Concerns    Resource/Environmental Concerns none    Transportation Concerns none       Transition Planning    Patient/Family Anticipates Transition to home with family    Patient/Family Anticipated Services at Transition outpatient care    Transportation Anticipated family or friend will provide       Discharge Needs Assessment    Readmission Within the Last 30 Days no previous admission in last 30 days    Equipment Currently Used at Home cpap    Concerns to be Addressed care coordination/care conferences;discharge planning    Anticipated Changes Related to Illness none    Equipment Needed After Discharge sling    Outpatient/Agency/Support Group Needs outpatient therapy    Discharge Facility/Level of Care Needs outpatient therapy    Provided Post Acute Provider List? Yes    Post Acute Provider List Outpatient Therapy  per Vonda RN    Delivered To Patient    Patient's Choice of Community Agency(s) [facility] OP PT in [place]               Discharge Plan     Row Name 08/25/22 1427       Plan    Plan Home with family.    Patient/Family in Agreement with Plan yes    Plan Comments Spoke with patient at bedside.  Confirmed PCP and pharmacy. Lives at home with wife,  who can transport patient home at discharge. Patient set up with OP PT at  OPPT in [place] per his choice.   Denies dc needs at this time.              Continued Care and  Services - Discharged on 8/25/2022 Admission date: 8/24/2022 - Discharge disposition: Home or Self Care    Therapy Coordination complete.    Service Provider Request Status Selected Services Address Phone Fax Patient Preferred    [facility] PHYSICAL THERAPY [place]   Selected Outpatient Rehabilitation [address] [place] [address] [place] [id number] [phone] --       Internal Comment last updated by Vonda Lo RN 8/22/2022 1122    PT IS HAVING A TOTAL SHOULDER ON 8/24 ALREADY HAS AN APPOINTMENT                          Expected Discharge Date and Time     Expected Discharge Date Expected Discharge Time    Aug 25, 2022          Demographic Summary     Row Name 08/25/22 1424       General Information    Admission Type observation    Arrived From home    Referral Source admission list    Reason for Consult discharge planning    Preferred Language English       Contact Information    Permission Granted to Share Info With                Functional Status     Row Name 08/25/22 1424       Functional Status    Usual Activity Tolerance good    Current Activity Tolerance good       Functional Status, IADL    Medications independent    Meal Preparation independent    Housekeeping independent    Laundry independent    Shopping independent       Mental Status    General Appearance WDL WDL       Mental Status Summary    Recent Changes in Mental Status/Cognitive Functioning no changes                      Sravani Grey, KAMERON   Met with patient in room wearing PPE: mask, face shield/goggles, gloves, gown.      Maintained distance greater than six feet and spent less than 15 minutes in the room.

## 2022-08-25 NOTE — THERAPY TREATMENT NOTE
Subjective: Robin Wheatley is s/p TSA, anticipating discharge this date.     Pain level: 3/10 VAS p/o shoulder    Objective: Pt states completing TSA HEP yesterday after therapy services without difficulty. OT reviewed HEP consisting of AROM of elbow/wrist and hand this date. Pt demos good understanding. Pt required assist in adjusting sling to comfort.      Pt dons/doffs abductor sling with Shantell.  Completes UB dressing with hemiplegic techniques with Supervision, and LB dressing with Supervision.      Pt completes PROM shoulder flexion 0 - 100 this date.    Education: Post-op shoulder precautions, self-care techniques and HEP. Handout provided in total joint handbook.     Assessment: Patient demonstrates good progression toward stated goals. Also with good understanding of HEP and shoulder precautions. Anticipate discharge home with family assistance this date.     Plan: Home Health OT/PT        
EKG

## 2022-08-25 NOTE — DISCHARGE SUMMARY
Date of Discharge:  8/25/2022    Discharge Diagnosis: Degenerative Joint Disease of the right shoulder    Presenting Problem/History of Present Illness  Active Hospital Problems    Diagnosis  POA   • **DJD of right shoulder [M19.011]  Yes   • Bradycardia [R00.1]  Yes   • Acquired hypothyroidism [E03.9]  Yes   • Sleep apnea [G47.30]  Yes   • Obesity (BMI 30-39.9) [E66.9]  Yes   • Benign essential hypertension [I10]  Yes   • Vertigo [R42]  Yes      Resolved Hospital Problems   No resolved problems to display.        Hospital Course  Patient is a 62 y.o. male presented with Right shoulder degenerative joint disease.  They underwent shoulder arthroplasty during admission and postop day 1 were tolerating diet and oral medication and pain was controlled    Procedures Performed: RIGHT total shoulder      Consults:   Consults     Date and Time Order Name Status Description    8/24/2022 10:06 AM Inpatient Consult to Hospitalist              Condition on Discharge:  Improved    Vital Signs  Vitals:    08/24/22 2018 08/25/22 0021 08/25/22 0406 08/25/22 0733   BP: 111/66 116/69 115/71 110/60   BP Location: Left arm Left arm Left arm Left arm   Patient Position: Lying Sitting Lying Lying   Pulse: 77 69 60 58   Resp: 16 16 15 14   Temp: 99.2 °F (37.3 °C) 98.2 °F (36.8 °C) 98.1 °F (36.7 °C) 98.9 °F (37.2 °C)   TempSrc: Oral Oral Oral Oral   SpO2: 93% 94% 94% 98%   Weight:       Height:           Physical Exam:  Dry dressing, Sensory and motor exam are intact all distributions. Radial pulse is palpable and capillary refill is less than two seconds to all digits       Discharge Disposition  Home    Discharge Medications     Discharge Medications      New Medications      Instructions Start Date   oxyCODONE-acetaminophen 5-325 MG per tablet  Commonly known as: Percocet   1 tablet, Oral, Every 6 Hours PRN      promethazine 12.5 MG tablet  Commonly known as: PHENERGAN   12.5 mg, Oral, Every 6 Hours PRN         Changes to Medications   "    Instructions Start Date   Diclofenac Sodium 1 % gel gel  Commonly known as: VOLTAREN  What changed:   · when to take this  · reasons to take this   4 g, Topical, 2 Times Daily      meloxicam 15 MG tablet  Commonly known as: MOBIC  What changed: when to take this   15 mg, Oral, Daily      olmesartan 20 MG tablet  Commonly known as: BENICAR  What changed: additional instructions   10 mg, Oral, Every Morning, LD 1/17      pentoxifylline 400 MG CR tablet  Commonly known as: TRENtal  What changed: when to take this   400 mg, Oral, Every 12 Hours      tadalafil 5 MG tablet  Commonly known as: CIALIS  What changed:   · when to take this  · reasons to take this   5 mg, Oral, Daily         Continue These Medications      Instructions Start Date   acetaminophen 500 MG tablet  Commonly known as: TYLENOL   1,000 mg, Oral, As Needed      ALLERGY SERUM INJECTION   0.16 mL, Subcutaneous, Weekly, wednesday      [place] Thyroid 60 MG tablet  Generic drug: Thyroid   60 mg, Oral, Daily, Take DOS      cetirizine 10 MG tablet  Commonly known as: zyrTEC   10 mg, Oral, Every Morning      [employer] Inner Ear Plus tablet   1 tablet, Oral, 2 Times Daily      DHA OMEGA 3 PO   1 tablet, Oral, 2 Times Daily      DHEA 25 MG capsule   1 capsule, Oral, Daily      fluticasone 50 MCG/ACT nasal spray  Commonly known as: FLONASE   2 sprays, Nasal, As Needed      HYALURONIC ACID PO   1 tablet, Oral, Daily      multivitamin with minerals tablet tablet   1 tablet, Oral, Daily      Syringe/Needle (Disp) 21G X 1\" 3 ML misc   BD ECLIPSE SYRINGE 21G X 1\" 3 ML      vitamin D3 125 MCG (5000 UT) capsule capsule   5,000 Units, Oral, Daily         Stop These Medications    traMADol 50 MG tablet  Commonly known as: ULTRAM              Discharge instructions:  Continue wearing sling.  Keep dressing on.  Okay to shower and perform home exercises.  Continue polar care. Follow up with Dr Hairston in 2 weeks.    Follow-up Appointments  Future Appointments   Date Time " Provider Department [place]   8/29/2022 10:00 AM Dang Champion PT [facility]   9/8/2022  1:00 PM Carl Hairston MD [facility] Bhatti PA-C  08/25/22  08:19 EDT      Disclaimer: Part of this note may be an electronic transcription/translation of spoken language to printed text using the Dragon Dictation System

## 2022-08-25 NOTE — CASE MANAGEMENT/SOCIAL WORK
Case Management Discharge Note      Final Note: Home    Provided Post Acute Provider List?: Yes  Post Acute Provider List: Outpatient Therapy (per Vonda RN)  Delivered To: Patient    Selected Continued Care - Discharged on 8/25/2022 Admission date: 8/24/2022 - Discharge disposition: Home or Self Care        Therapy Coordination complete.    Service Provider Selected Services Address Phone Fax Patient Preferred    [facility] PHYSICAL THERAPY [place]  Outpatient Rehabilitation [address] [place] [address] [address], [place] [address] [phone] --       Internal Comment last updated by Vonda Lo, RN 8/22/2022 1122    PT IS HAVING A TOTAL SHOULDER ON 8/24 ALREADY HAS AN APPOINTMENT                              Transportation Services  Private: Car    Final Discharge Disposition Code: 01 - home or self-care

## 2022-08-26 ENCOUNTER — TRANSITIONAL CARE MANAGEMENT TELEPHONE ENCOUNTER (OUTPATIENT)
Dept: CALL CENTER | Facility: HOSPITAL | Age: 62
End: 2022-08-26

## 2022-08-26 NOTE — OUTREACH NOTE
Call Center TCM Note    Flowsheet Row Responses   [facility] patient discharged from? [facility]   Does the patient have one of the following disease processes/diagnoses(primary or secondary)? General Surgery   TCM attempt successful? Yes   Call start time 1158   Call end time 1205   Discharge diagnosis DJD-total shoulder reverse arthro   Person spoke with today (if not patient) and relationship patient   Meds reviewed with patient/caregiver? Yes  [New: percocet and phenergan]   Does the patient have all medications related to this admission filled (includes all antibiotics, pain medications, etc.) Yes   Is the patient taking all medications as directed (includes completed medication regime)? Yes   Does the patient have a follow up appointment scheduled with their surgeon? Yes  [9/8]   Has the patient kept scheduled appointments due by today? N/A   Comments Marlo Michaels Jr., MD PCP. Patient declines to schedule PCP HFU at this time.    What is the Home health agency?  --  [patient to go to outpt PT. Eval scheduled for 8/29]   Has home health visited the patient within 72 hours of discharge? N/A   DME comments Patient wearing sling as advised.    Psychosocial issues? No   Did the patient receive a copy of their discharge instructions? Yes   Nursing interventions Reviewed instructions with patient   What is the patient's perception of their health status since discharge? Improving   Is the patient /caregiver able to teach back basic post-op care? --  [patient reports not his first shoulder surgery. ]   Is the patient/caregiver able to teach back signs and symptoms of incisional infection? Increased redness, swelling or pain at the incisonal site  [Patient reports pain manageable. Reports that he is wearing ice pack. ]   Is the patient/caregiver able to teach back steps to recovery at home? Set small, achievable goals for return to baseline health, Rest and rebuild strength, gradually increase activity   If the  patient is a current smoker, are they able to teach back resources for cessation? Not a smoker   Is the patient/caregiver able to teach back the hierarchy of who to call/visit for symptoms/problems? PCP, Specialist, Home health nurse, Urgent Care, ED, 911 Yes   TCM call completed? Yes   Wrap up additional comments Denies questions or needs today.           Fabiana Pepe RN    8/26/2022, 12:05 EDT

## 2022-08-29 ENCOUNTER — TREATMENT (OUTPATIENT)
Dept: PHYSICAL THERAPY | Facility: CLINIC | Age: 62
End: 2022-08-29

## 2022-08-29 DIAGNOSIS — M25.619 LIMITED RANGE OF MOTION (ROM) OF SHOULDER: ICD-10-CM

## 2022-08-29 DIAGNOSIS — M25.511 ACUTE PAIN OF RIGHT SHOULDER: ICD-10-CM

## 2022-08-29 DIAGNOSIS — Z96.611 S/P REVERSE TOTAL SHOULDER ARTHROPLASTY, RIGHT: Primary | ICD-10-CM

## 2022-08-29 PROCEDURE — 97140 MANUAL THERAPY 1/> REGIONS: CPT | Performed by: PHYSICAL THERAPIST

## 2022-08-29 PROCEDURE — 97161 PT EVAL LOW COMPLEX 20 MIN: CPT | Performed by: PHYSICAL THERAPIST

## 2022-08-29 NOTE — PROGRESS NOTES
Physical Therapy Initial Evaluation and Plan of Care    Patient: Robin Wheatley   : 1960  Diagnosis/ICD-10 Code:  S/P reverse total shoulder arthroplasty, right [Z96.611]  Referring practitioner: Carl Hairston, *  Date of Initial Visit: 2022  Today's Date: 2022  Patient seen for 1 sessions           Subjective Questionnaire: PT Functional Test: Quick DASH = 80% impairment      Subjective Evaluation    History of Present Illness  Date of surgery: 2022  Mechanism of injury: Pt is 5 days s/p reverse R TSR. States shoulder is feeling pretty good since surgery. Using ice on R shoulder about every 2 hr. Taking pain med ~2-3x/day. Sleeping in recliner. Pt reports he was worsening pain and was unable to lift his arm for ~2 months prior to surgery. Pt with hx of RCR x2. 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 Pt underwent L reverse TSR 2021.      Patient Occupation:  (plant maintenance) Quality of life: excellent    Pain  Current pain rating: 3  At best pain rating: 3  At worst pain ratin  Relieving factors: ice and medications  Aggravating factors: overhead activity, lifting, movement, sleeping, prolonged positioning, outstretched reach and repetitive movement  Progression: no change    Social Support  Lives with: spouse    Hand dominance: right    Patient Goals  Patient goals for therapy: decreased edema, decreased pain, increased motion, increased strength, independence with ADLs/IADLs, return to sport/leisure activities and return to work             Objective          Observations     Right Shoulder  Positive for incision.     Additional Shoulder Observation Details  Incision covered with waterproof dressing. No signs of drainage on bandage. Bruising noted medial aspect of R upper arm.  Sling in appropriate position at start  of session.    Palpation     Right   Hypertonic in the upper trapezius.     Cervical/Thoracic Screen   Cervical range of motion within normal limits    Active Range of Motion   Left Shoulder   Flexion: 145 degrees   External rotation BTH: C7   Internal rotation BTB: L1     Passive Range of Motion     Right Shoulder   Flexion: 95 degrees   Abduction: 80 degrees   External rotation 45°: 30 degrees     Strength/Myotome Testing     Left Shoulder   Normal muscle strength    Additional Strength Details  R shoulder strength not assessed per protocol restrictions.          Assessment & Plan     Assessment  Impairments: abnormal coordination, abnormal muscle tone, abnormal or restricted ROM, activity intolerance, impaired physical strength, lacks appropriate home exercise program and pain with function  Functional Limitations: carrying objects, lifting, sleeping, pulling, pushing, uncomfortable because of pain, moving in bed, reaching behind back, reaching overhead and unable to perform repetitive tasks  Assessment details: Pt is 5 days s/p R reverse TSR. He presents with decreased ROM and strength of R shoulder. Pt is having difficulty with ADLs. Difficulty sleeping. Unable to work due to R UE restrictions per protocol. Quick DASH indicates 80% impairment.    Patient presents with the impairments listed above and based on the objective findings and the physical therapy evaluation, the patient’s condition has the potential to improve in response to therapy.   The patient’s condition and/or services required are at a level of complexity that necessitates the skill & supervision of a physical therapist.    Prognosis: good    Goals  Plan Goals: STG to be met in 3 wk:  - Increase R shoulder flex PROM to 130 deg.  - Pt to demonstrated improved posture with min verbal cues.  - PT to initiate AAROM next visit.  LTG to be met in 12 wk:  - Improve Quick DASH to 30% or less impairment.  - Increase R shoulder flex AROM to 140 deg in  order to reach into overhead cabinets and retrieve a plate.  - Increase R shoulder strength to 4/5 or greater in all directions for full return to work where he needs to be able to exert 50# of force frequently.  - Pt to be independent with HEP.    Plan  Therapy options: will be seen for skilled therapy services  Planned modality interventions: electrical stimulation/Russian stimulation and thermotherapy (hydrocollator packs)  Other planned modality interventions: modalities as indicated  Planned therapy interventions: manual therapy, body mechanics training, flexibility, functional ROM exercises, home exercise program, joint mobilization, postural training, soft tissue mobilization, spinal/joint mobilization, strengthening, stretching, therapeutic activities and neuromuscular re-education  Frequency: 2x week  Duration in weeks: 12  Treatment plan discussed with: patient        Timed:         Manual Therapy:    15     mins  35394;     Therapeutic Exercise:    5     mins  62881;     Neuromuscular Michael:        mins  59434;    Therapeutic Activity:          mins  63130;     Gait Training:           mins  88639;     Ultrasound:          mins  50619;    Ionto                                   mins   82022  Self - Care                          mins  37124    Un-Timed:  Electrical Stimulation:         mins  84861 ( );  Dry Needling          20561/20560  Traction          mins 59375  Can Repos          mins 35464  Low Eval     25     Mins  59619  Mod Eval          Mins  85937  High Eval                            Mins  93788      Timed Treatment:   20   mins   Total Treatment:     45   mins      PT SIGNATURE: Yeny Kapadia PT, CLT  [place] Lic # [id number]  Electronically signed by Yeny Kapadia PT, 08/29/22, 11:52 AM EDT    Initial Certification  Certification Period: 8/29/2022 thru 11/26/2022  I certify that the therapy services are furnished while this patient is under my care.  The services outlined above are  required by this patient, and will be reviewed every 90 days.     PHYSICIAN: Carl Hairston MD _____________________________________________________  NPI: [id number]                                      DATE:    Please sign and return via fax to [phone]. Thank you, [facility] Physical Therapy.

## 2022-09-06 ENCOUNTER — TELEPHONE (OUTPATIENT)
Dept: PHYSICAL THERAPY | Facility: CLINIC | Age: 62
End: 2022-09-06

## 2022-09-08 ENCOUNTER — OFFICE VISIT (OUTPATIENT)
Dept: ORTHOPEDIC SURGERY | Facility: CLINIC | Age: 62
End: 2022-09-08

## 2022-09-08 ENCOUNTER — TREATMENT (OUTPATIENT)
Dept: PHYSICAL THERAPY | Facility: CLINIC | Age: 62
End: 2022-09-08

## 2022-09-08 VITALS — BODY MASS INDEX: 33.2 KG/M2 | WEIGHT: 267 LBS | OXYGEN SATURATION: 96 % | HEIGHT: 75 IN | HEART RATE: 54 BPM

## 2022-09-08 DIAGNOSIS — Z96.611 S/P REVERSE TOTAL SHOULDER ARTHROPLASTY, RIGHT: Primary | ICD-10-CM

## 2022-09-08 DIAGNOSIS — M19.012 ARTHRITIS OF LEFT SHOULDER REGION: ICD-10-CM

## 2022-09-08 DIAGNOSIS — M75.121 COMPLETE TEAR OF RIGHT ROTATOR CUFF, UNSPECIFIED WHETHER TRAUMATIC: Primary | ICD-10-CM

## 2022-09-08 DIAGNOSIS — Z96.612 STATUS POST REVERSE TOTAL REPLACEMENT OF LEFT SHOULDER: ICD-10-CM

## 2022-09-08 DIAGNOSIS — Z47.89 ORTHOPEDIC AFTERCARE: ICD-10-CM

## 2022-09-08 DIAGNOSIS — M25.619 LIMITED RANGE OF MOTION (ROM) OF SHOULDER: ICD-10-CM

## 2022-09-08 DIAGNOSIS — M25.511 ACUTE PAIN OF RIGHT SHOULDER: ICD-10-CM

## 2022-09-08 DIAGNOSIS — M25.512 ACUTE PAIN OF LEFT SHOULDER: ICD-10-CM

## 2022-09-08 PROCEDURE — 99024 POSTOP FOLLOW-UP VISIT: CPT | Performed by: ORTHOPAEDIC SURGERY

## 2022-09-08 PROCEDURE — 97140 MANUAL THERAPY 1/> REGIONS: CPT | Performed by: PHYSICAL THERAPIST

## 2022-09-08 PROCEDURE — 97110 THERAPEUTIC EXERCISES: CPT | Performed by: PHYSICAL THERAPIST

## 2022-09-08 NOTE — PATIENT INSTRUCTIONS
Shoulder Arthroplasty: Post- Operative Visit Objectives    Review the operative findings, procedures and photos.  Make sure medications are effective and not causing problems.  Pain: Oxycodone or hydrocodone is for pain. You may take 1 tablet every 6 hours as necessary.  Some patients don’t require the use of these…in those circumstances just use Tylenol extra-strength 1 or 2 tablets every 4-6 hours.   NSAIDs. For pain and inflammation.  You can take an over the counter anti-inflammatory of choice such as Aleve, Ibuprofen, Motrin or Advil during this time.  If you have had any problems stop taking these medicines and please tell us!  Wound Care:  Today we will remove your dressings.  There may be some residual glue on your incision.  It is now ok to shower without covering it. Please avoid submerging the incision for another two weeks.  Continue ice pack as needed.  Exercises and Physical Therapy   Continue your physical therapy and daily home exercises focusing especially on overhead motion.  Continue the sling and remove for activities such as showering and bringing you hand to your face or hair, no motion behind your back for the next 4 weeks.  Some shoulder replacements with a rotator cuff repair will have more restrictions and we will go over those.   Follow Up appointments Schedule Follow up visits as directed usually in 4 weeks..  Notes  Make sure you have all necessary notes and documentation for school or work.  Issues: Remember our goal is to make this process smooth and easy if there is any thing you need please ask us or call back [phone]

## 2022-09-08 NOTE — PROGRESS NOTES
Physical Therapy Daily Progress Note      Patient: Robin Wheatley   : 1960  Diagnosis/ICD-10 Code:  S/P reverse total shoulder arthroplasty, right [Z96.611]  Referring practitioner: Carl Hairston, *  Date of Initial Visit: Type: THERAPY  Noted: 2022  Today's Date: 2022  Patient seen for 2 sessions         Robin Wheatley reports: his R shoulder is feeling much better than his L did at 2 weeks post op and states his PROM stretches and AAROM stretches are going very well and he has been careful to not strain and has only felt stretching no pain within his protocol. Pt. reports he has MD follow up today to asses incision health and progress.    Objective   See Exercise, Manual, and Modality Logs for complete treatment.     Assessment/Plan   Pt. Is tolerating PROM very well at this time with tight end ranges without pain. Pt. benefit from weight free AAROM stretches on swiss ball and with OTD pulleys. Pt. Shows good scapular passive mobility as well assessed in side lying and will progressed within protocol as tolerable.    Goals  Plan Goals: STG to be met in 3 wk:  - Increase R shoulder flex PROM to 130 deg.  - Pt to demonstrated improved posture with min verbal cues.  - PT to initiate AAROM next visit.  LTG to be met in 12 wk:  - Improve Quick DASH to 30% or less impairment.  - Increase R shoulder flex AROM to 140 deg in order to reach into overhead cabinets and retrieve a plate.  - Increase R shoulder strength to 4/5 or greater in all directions for full return to work where he needs to be able to exert 50# of force frequently.  - Pt to be independent with HEP.    Progress strengthening /stabilization /functional activity           Timed:         Manual Therapy:    15     mins  49021;     Therapeutic Exercise:    25     mins  45741;       Timed Treatment:   40   mins   Total Treatment:     50   mins    Alyssa Jonas PTA  Physical Therapist Assistant License #[id number]

## 2022-09-08 NOTE — PROGRESS NOTES
"     Patient ID: Robin Wheatley is a 62 y.o. male.    8/24/22 right reverse total shoulder  Pain controlled  Objective:    Pulse 54   Ht 190.5 cm (75\")   Wt 121 kg (267 lb)   SpO2 96%   BMI 33.37 kg/m²     Physical Examination:  Incision well approximated no sign of infection  Sensory and motor exam are intact all distributions. Radial pulse is palpable and capillary refill is less than two seconds to all digits.       Imaging:  right Shoulder X-Ray  Indication: Postop total shoulder  AP Y and Lateral views  Findings: Reverse total shoulder in position  no bony lesion  Soft tissues normal  not examined joint spaces  Hardware appropriately positioned yes      yes prior studies available for comparison.    Assessment:  Doing well after shoulder arthroplasty    Plan:  Continue sling and therapy see me in a month  "

## 2022-09-12 ENCOUNTER — TREATMENT (OUTPATIENT)
Dept: PHYSICAL THERAPY | Facility: CLINIC | Age: 62
End: 2022-09-12

## 2022-09-12 DIAGNOSIS — M25.619 LIMITED RANGE OF MOTION (ROM) OF SHOULDER: ICD-10-CM

## 2022-09-12 DIAGNOSIS — M25.511 ACUTE PAIN OF RIGHT SHOULDER: ICD-10-CM

## 2022-09-12 DIAGNOSIS — Z96.611 S/P REVERSE TOTAL SHOULDER ARTHROPLASTY, RIGHT: Primary | ICD-10-CM

## 2022-09-12 PROCEDURE — 97140 MANUAL THERAPY 1/> REGIONS: CPT | Performed by: PHYSICAL THERAPIST

## 2022-09-12 PROCEDURE — 97110 THERAPEUTIC EXERCISES: CPT | Performed by: PHYSICAL THERAPIST

## 2022-09-12 NOTE — PROGRESS NOTES
Physical Therapy Daily Progress Note      Patient: Robin Wheatley   : 1960  Diagnosis/ICD-10 Code:  S/P reverse total shoulder arthroplasty, right [Z96.611]   Problems Addressed this Visit    None     Visit Diagnoses     S/P reverse total shoulder arthroplasty, right    -  Primary    Acute pain of right shoulder        Limited range of motion (ROM) of shoulder          Diagnoses       Codes Comments    S/P reverse total shoulder arthroplasty, right    -  Primary ICD-10-CM: Z96.611  ICD-9-CM: V43.61     Acute pain of right shoulder     ICD-10-CM: M25.511  ICD-9-CM: 719.41     Limited range of motion (ROM) of shoulder     ICD-10-CM: M25.619  ICD-9-CM: 719.51         Referring practitioner: Carl Hairston, *  Date of Initial Visit: Type: THERAPY  Noted: 2022  Today's Date: 2022    VISIT#: 3    Subjective : Pt reports his shoulder is feeling good. Rates pain at 2-3/10 at most. States he is sleeping in bed and tries to put a pillow under his arm but is generally moves. Pt asking when he can start bearing wt on R arm. 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    Objective : Educated pt that no R UE wt bearing until at least 6 wk.   Added AAROM supine press-up, flex and ER in 30 deg abd. Progress wrist flex, ext, and bicep curls to 2#.    Flex PROM ~120 deg, abd ~100 deg, IR in scapular plane ~60 deg.    See Exercise, Manual, and Modality Logs for complete treatment.     Assessment/Plan : Minimal pain at start of session. Good tolerance to ther ex progression per protocol. 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 Will progress ther ex per protocol.    Goals  Plan Goals: STG to be met in 3 wk:  - Increase R shoulder flex PROM to 130 deg.  - Pt to demonstrated improved posture with min verbal cues.  - PT to initiate AAROM next visit. - MET  LTG to be met in 12 wk:  - Improve Quick DASH to 30% or less impairment.  - Increase R  shoulder flex AROM to 140 deg in order to reach into overhead cabinets and retrieve a plate.  - Increase R shoulder strength to 4/5 or greater in all directions for full return to work where he needs to be able to exert 50# of force frequently.  - Pt to be independent with HEP.    Progress per Plan of Care         Timed:         Manual Therapy:    15     mins  43654;     Therapeutic Exercise:    15     mins  94322;     Neuromuscular Michael:        mins  52585;    Therapeutic Activity:          mins  62163;     Gait Training:           mins  41206;     Ultrasound:          mins  74750;    Ionto                                   mins   15733  Self Care                            mins   87437    Un-Timed:  Electrical Stimulation:         mins  50846 ( );  Dry Needling          mins 64942/05152  Traction          mins 50181  Canalith Repos                   mins  22360  Low Eval          Mins  80664  Mod Eval          Mins  15410  High Eval                            Mins  48475  Re-Eval                               mins  03821    Timed Treatment:   30   mins   Total Treatment:     40   mins    Yeny Kapadia, PT, CLT, Cert DN  Physical Therapist  [place] Lic # [id number]

## 2022-09-19 ENCOUNTER — TREATMENT (OUTPATIENT)
Dept: PHYSICAL THERAPY | Facility: CLINIC | Age: 62
End: 2022-09-19

## 2022-09-19 DIAGNOSIS — M25.511 ACUTE PAIN OF RIGHT SHOULDER: ICD-10-CM

## 2022-09-19 DIAGNOSIS — M25.619 LIMITED RANGE OF MOTION (ROM) OF SHOULDER: ICD-10-CM

## 2022-09-19 DIAGNOSIS — Z96.611 S/P REVERSE TOTAL SHOULDER ARTHROPLASTY, RIGHT: Primary | ICD-10-CM

## 2022-09-19 PROCEDURE — 97140 MANUAL THERAPY 1/> REGIONS: CPT | Performed by: PHYSICAL THERAPIST

## 2022-09-19 PROCEDURE — 97110 THERAPEUTIC EXERCISES: CPT | Performed by: PHYSICAL THERAPIST

## 2022-09-19 NOTE — PROGRESS NOTES
Physical Therapy Daily Progress Note      Patient: Robin Wheatley   : 1960  Diagnosis/ICD-10 Code:  S/P reverse total shoulder arthroplasty, right [Z96.611]   Problems Addressed this Visit    None     Visit Diagnoses     S/P reverse total shoulder arthroplasty, right    -  Primary    Acute pain of right shoulder        Limited range of motion (ROM) of shoulder          Diagnoses       Codes Comments    S/P reverse total shoulder arthroplasty, right    -  Primary ICD-10-CM: Z96.611  ICD-9-CM: V43.61     Acute pain of right shoulder     ICD-10-CM: M25.511  ICD-9-CM: 719.41     Limited range of motion (ROM) of shoulder     ICD-10-CM: M25.619  ICD-9-CM: 719.51         Referring practitioner: Carl Hairston, *  Date of Initial Visit: Type: THERAPY  Noted: 2022  Today's Date: 2022    VISIT#: 4    Subjective : Pt reports his shoulder is feeling good. Has started to go some without his sling during the day and in his house only. Puts sling on when going outside and wearing at night.    Objective : added standing flex and scaption AAROM with dowel.  Cues to perform scap retraction intermittently during ther ex for improved position of scapular. Abd PROM improved with this and pt reported decreased discomfort during PROM.    Flex PROM ~130 deg, abd ~120 deg  See Exercise, Manual, and Modality Logs for complete treatment.     Assessment/Plan : Pt will be 4 wk post-op on 2022. Pt making gradual ROM gains and good tolerance to elbow and wrist strengthening. Minimal pain at start of session. Good tolerance to ther ex progression per protocol. 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 Will progress ther ex per protocol.     Goals  Plan Goals: STG to be met in 3 wk:  - Increase R shoulder flex PROM to 130 deg. - MET  - Pt to demonstrated improved posture with min verbal cues. - Progressing  - PT to initiate AAROM next visit. - MET  LTG to be met in 12 wk:  -  Improve Quick DASH to 30% or less impairment.  - Increase R shoulder flex AROM to 140 deg in order to reach into overhead cabinets and retrieve a plate.  - Increase R shoulder strength to 4/5 or greater in all directions for full return to work where he needs to be able to exert 50# of force frequently.  - Pt to be independent with HEP.    Progress per Plan of Care         Timed:         Manual Therapy:    15     mins  65778;     Therapeutic Exercise:    15     mins  01465;     Neuromuscular Michael:        mins  02897;    Therapeutic Activity:          mins  26339;     Gait Training:           mins  74502;     Ultrasound:          mins  81200;    Ionto                                   mins   72165  Self Care                            mins   98346    Un-Timed:  Electrical Stimulation:         mins  87781 ( );  Dry Needling          mins 00023/18009  Traction          mins 12439  Canalith Repos                   mins  49973  Low Eval          Mins  79507  Mod Eval          Mins  83061  High Eval                            Mins  11118  Re-Eval                               mins  14132    Timed Treatment:   30   mins   Total Treatment:     40   mins    Yeny Kapadia, PT, CLT, Cert DN  Physical Therapist  [place] Lic # [id number]

## 2022-09-21 ENCOUNTER — TREATMENT (OUTPATIENT)
Dept: PHYSICAL THERAPY | Facility: CLINIC | Age: 62
End: 2022-09-21

## 2022-09-21 DIAGNOSIS — M25.511 ACUTE PAIN OF RIGHT SHOULDER: ICD-10-CM

## 2022-09-21 DIAGNOSIS — M25.619 LIMITED RANGE OF MOTION (ROM) OF SHOULDER: ICD-10-CM

## 2022-09-21 DIAGNOSIS — Z96.611 S/P REVERSE TOTAL SHOULDER ARTHROPLASTY, RIGHT: Primary | ICD-10-CM

## 2022-09-21 PROCEDURE — 97140 MANUAL THERAPY 1/> REGIONS: CPT | Performed by: PHYSICAL THERAPIST

## 2022-09-21 PROCEDURE — 97110 THERAPEUTIC EXERCISES: CPT | Performed by: PHYSICAL THERAPIST

## 2022-09-21 NOTE — TELEPHONE ENCOUNTER
Caller: Robin Wheatley    Relationship: Self    Best call back number:[phone]    Requested Prescriptions:   Requested Prescriptions     Pending Prescriptions Disp Refills   • [place] Thyroid 60 MG tablet 90 tablet 1     Sig: Take 1 tablet by mouth Daily. Take [facility]        Pharmacy where request should be sent: [facility] [place] [address] [phone]  - [phone] FX     Additional details provided by patient: 10 DAYS LEFT/WOULD LIKE REFILLS ADDED    Does the patient have less than a 3 day supply:  [] Yes  [x] No    Eliecer Curran Rep   09/21/22 08:24 EDT

## 2022-09-21 NOTE — PROGRESS NOTES
Physical Therapy Daily Progress Note      Patient: Robin Wheatley   : 1960  Diagnosis/ICD-10 Code:  S/P reverse total shoulder arthroplasty, right [Z96.611]  Referring practitioner: Carl Hairston, *  Date of Initial Visit: Type: THERAPY  Noted: 2022  Today's Date: 2022  Patient seen for 5 sessions         Robin Wheatley reports: his pain and function continue to improve and he is doing best to mainatin rotocol as well as daily HEP. Pt. States this arm continues to do much better than his previous L TSA.    Objective   See Exercise, Manual, and Modality Logs for complete treatment.     Assessment/Plan   Pt. Shows continuous progress at this time and protocol is maintained at this time progressing to isometrics and bent over rows this date with no pain.    Goals  Plan Goals: STG to be met in 3 wk:  - Increase R shoulder flex PROM to 130 deg. - MET  - Pt to demonstrated improved posture with min verbal cues. - Progressing  - PT to initiate AAROM next visit. - MET  LTG to be met in 12 wk:  - Improve Quick DASH to 30% or less impairment.  - Increase R shoulder flex AROM to 140 deg in order to reach into overhead cabinets and retrieve a plate.  - Increase R shoulder strength to 4/5 or greater in all directions for full return to work where he needs to be able to exert 50# of force frequently.  - Pt to be independent with HEP.    Progress strengthening /stabilization /functional activity           Timed:         Manual Therapy:    15     mins  69835;     Therapeutic Exercise:    25     mins  20243;         Timed Treatment:   40   mins   Total Treatment:     40   mins    Alyssa Jonas PTA  Physical Therapist Assistant License #[id number]

## 2022-09-22 RX ORDER — THYROID 60 MG
60 TABLET ORAL DAILY
Qty: 90 TABLET | Refills: 1 | Status: SHIPPED | OUTPATIENT
Start: 2022-09-22 | End: 2023-04-03

## 2022-09-26 ENCOUNTER — TREATMENT (OUTPATIENT)
Dept: PHYSICAL THERAPY | Facility: CLINIC | Age: 62
End: 2022-09-26

## 2022-09-26 DIAGNOSIS — Z96.611 S/P REVERSE TOTAL SHOULDER ARTHROPLASTY, RIGHT: Primary | ICD-10-CM

## 2022-09-26 DIAGNOSIS — M25.619 LIMITED RANGE OF MOTION (ROM) OF SHOULDER: ICD-10-CM

## 2022-09-26 DIAGNOSIS — M25.511 ACUTE PAIN OF RIGHT SHOULDER: ICD-10-CM

## 2022-09-26 PROCEDURE — 97110 THERAPEUTIC EXERCISES: CPT | Performed by: PHYSICAL THERAPIST

## 2022-09-26 PROCEDURE — 97140 MANUAL THERAPY 1/> REGIONS: CPT | Performed by: PHYSICAL THERAPIST

## 2022-09-26 NOTE — PROGRESS NOTES
Physical Therapy Daily Progress Note      Patient: Robin Wheatley   : 1960  Diagnosis/ICD-10 Code:  S/P reverse total shoulder arthroplasty, right [Z96.611]   Problems Addressed this Visit    None     Visit Diagnoses     S/P reverse total shoulder arthroplasty, right    -  Primary    Acute pain of right shoulder        Limited range of motion (ROM) of shoulder          Diagnoses       Codes Comments    S/P reverse total shoulder arthroplasty, right    -  Primary ICD-10-CM: Z96.611  ICD-9-CM: V43.61     Acute pain of right shoulder     ICD-10-CM: M25.511  ICD-9-CM: 719.41     Limited range of motion (ROM) of shoulder     ICD-10-CM: M25.619  ICD-9-CM: 719.51         Referring practitioner: Carl Hairston, *  Date of Initial Visit: Type: THERAPY  Noted: 2022  Today's Date: 2022    VISIT#: 6    Subjective : pt reports his shoulder is feeling about the same, stays a little sore. Will be happy to get rid of his sling. Sees ortho on 10/6/2022.    Objective : added mid rows and lat pulls to neutral and rhythmic stabilization in supine with R shoulder at 90 deg flexion.    See Exercise, Manual, and Modality Logs for complete treatment.     Assessment/Plan : Continues to have tightness in flexion ROM and is making gradual gains. Good tolerance to ther ex progression with shoulder fatigue reported. 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 Will progress ther ex per protocol.     Goals  Plan Goals: STG to be met in 3 wk:  - Increase R shoulder flex PROM to 130 deg. - MET  - Pt to demonstrated improved posture with min verbal cues. - Progressing  - PT to initiate AAROM next visit. - MET  LTG to be met in 12 wk:  - Improve Quick DASH to 30% or less impairment.  - Increase R shoulder flex AROM to 140 deg in order to reach into overhead cabinets and retrieve a plate.  - Increase R shoulder strength to 4/5 or greater in all directions for full return to work where  he needs to be able to exert 50# of force frequently.  - Pt to be independent with HEP.    Progress per Plan of Care and Progress strengthening /stabilization /functional activity         Timed:         Manual Therapy:    15     mins  82241;     Therapeutic Exercise:    25     mins  89466;     Neuromuscular Michael:        mins  69646;    Therapeutic Activity:          mins  00051;     Gait Training:           mins  45620;     Ultrasound:          mins  07717;    Ionto                                   mins   33284  Self Care                            mins   69390    Un-Timed:  Electrical Stimulation:         mins  33607 ( );  Dry Needling          mins 89074/75221  Traction          mins 74325  Canalith Repos                   mins  65610  Low Eval          Mins  89505  Mod Eval          Mins  39156  High Eval                            Mins  01356  Re-Eval                               mins  58305    Timed Treatment:   40   mins   Total Treatment:     50   mins    Yeny Kapadia PT, CLT, Cert DN  Physical Therapist  [place] Lic # [id number]

## 2022-09-29 ENCOUNTER — TREATMENT (OUTPATIENT)
Dept: PHYSICAL THERAPY | Facility: CLINIC | Age: 62
End: 2022-09-29

## 2022-09-29 DIAGNOSIS — M25.511 ACUTE PAIN OF RIGHT SHOULDER: ICD-10-CM

## 2022-09-29 DIAGNOSIS — M25.619 LIMITED RANGE OF MOTION (ROM) OF SHOULDER: ICD-10-CM

## 2022-09-29 DIAGNOSIS — Z96.611 S/P REVERSE TOTAL SHOULDER ARTHROPLASTY, RIGHT: Primary | ICD-10-CM

## 2022-09-29 PROCEDURE — 97140 MANUAL THERAPY 1/> REGIONS: CPT | Performed by: PHYSICAL THERAPIST

## 2022-09-29 PROCEDURE — 97110 THERAPEUTIC EXERCISES: CPT | Performed by: PHYSICAL THERAPIST

## 2022-09-29 NOTE — PROGRESS NOTES
Physical Therapy Daily Progress Note      Patient: Robin Wheatley   : 1960  Diagnosis/ICD-10 Code:  S/P reverse total shoulder arthroplasty, right [Z96.611]   Problems Addressed this Visit    None     Visit Diagnoses     S/P reverse total shoulder arthroplasty, right    -  Primary    Acute pain of right shoulder        Limited range of motion (ROM) of shoulder          Diagnoses       Codes Comments    S/P reverse total shoulder arthroplasty, right    -  Primary ICD-10-CM: Z96.611  ICD-9-CM: V43.61     Acute pain of right shoulder     ICD-10-CM: M25.511  ICD-9-CM: 719.41     Limited range of motion (ROM) of shoulder     ICD-10-CM: M25.619  ICD-9-CM: 719.51         Referring practitioner: Carl Hairston, *  Date of Initial Visit: Type: THERAPY  Noted: 2022  Today's Date: 2022    VISIT#: 7    Subjective : pt reports his shoulder is a little sore from using it for light activities at home. Pt assures PT that he did not do anything with his arm that caused pain.    Objective          Passive Range of Motion     Right Shoulder   Flexion: 143 degrees   Abduction: 150 degrees   External rotation 45°: 62 degrees   Internal rotation 45°: 74 degrees       Added isometrics to HEP. Pt demonstrated good understanding.    See Exercise, Manual, and Modality Logs for complete treatment.     Assessment/Plan : Pt making good PROM gains. Good tolerance to ther ex progression per protocol. Pain at end range flexion PROM. Will continue to progress ther ex per protocol. 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    Goals  Plan Goals: STG to be met in 3 wk:  - Increase R shoulder flex PROM to 130 deg. - MET  - Pt to demonstrated improved posture with min verbal cues. - Progressing  - PT to initiate AAROM next visit. - MET  LTG to be met in 12 wk:  - Improve Quick DASH to 30% or less impairment.  - Increase R shoulder flex AROM to 140 deg in order to reach into overhead  cabinets and retrieve a plate.  - Increase R shoulder strength to 4/5 or greater in all directions for full return to work where he needs to be able to exert 50# of force frequently.  - Pt to be independent with HEP.    Progress per Plan of Care         Timed:         Manual Therapy:    15     mins  47458;     Therapeutic Exercise:    25     mins  63295;     Neuromuscular Michael:        mins  50423;    Therapeutic Activity:          mins  13494;     Gait Training:           mins  21213;     Ultrasound:          mins  80355;    Ionto                                   mins   37551  Self Care                            mins   53679    Un-Timed:  Electrical Stimulation:         mins  70015 ( );  Dry Needling          mins 95516/78810  Traction          mins 05810  Canalith Repos                   mins  71128  Low Eval          Mins  63408  Mod Eval          Mins  82361  High Eval                            Mins  86102  Re-Eval                               mins  60359    Timed Treatment:   40   mins   Total Treatment:     50   mins    Yeny Kapadia, PT, CLT, Cert DN  Physical Therapist  [place] Lic # [id number]

## 2022-10-03 ENCOUNTER — TREATMENT (OUTPATIENT)
Dept: PHYSICAL THERAPY | Facility: CLINIC | Age: 62
End: 2022-10-03

## 2022-10-03 DIAGNOSIS — M25.511 ACUTE PAIN OF RIGHT SHOULDER: ICD-10-CM

## 2022-10-03 DIAGNOSIS — M25.619 LIMITED RANGE OF MOTION (ROM) OF SHOULDER: ICD-10-CM

## 2022-10-03 DIAGNOSIS — Z96.611 S/P REVERSE TOTAL SHOULDER ARTHROPLASTY, RIGHT: Primary | ICD-10-CM

## 2022-10-03 PROCEDURE — 97140 MANUAL THERAPY 1/> REGIONS: CPT | Performed by: PHYSICAL THERAPIST

## 2022-10-03 PROCEDURE — 97110 THERAPEUTIC EXERCISES: CPT | Performed by: PHYSICAL THERAPIST

## 2022-10-03 NOTE — PROGRESS NOTES
Physical Therapy Daily Progress Note      Patient: Robin Wheatley   : 1960  Diagnosis/ICD-10 Code:  S/P reverse total shoulder arthroplasty, right [Z96.611]  Referring practitioner: Carl Hairston, *  Date of Initial Visit: Type: THERAPY  Noted: 2022  Today's Date: 10/3/2022  Patient seen for 8 sessions         Robin Wheatley reports: he Is doing his exercise daily and reports ER and IR are his weakest movements at this time when doing isometrics. Pt. States his motion is improving but still weak overall when lifting arm.    Objective   See Exercise, Manual, and Modality Logs for complete treatment.     Assessment/Plan   Pt. Continues to progress well within protocol and maintains restrictions as protocol dictates. Pt. Will be progressed as motion improves and as protocol allows. Pt. Posture is improving however overall forward rounding of the shoulders is noted.    Goals  Plan Goals: STG to be met in 3 wk:  - Increase R shoulder flex PROM to 130 deg. - MET  - Pt to demonstrated improved posture with min verbal cues. - Progressing  - PT to initiate AAROM next visit. - MET  LTG to be met in 12 wk:  - Improve Quick DASH to 30% or less impairment.  - Increase R shoulder flex AROM to 140 deg in order to reach into overhead cabinets and retrieve a plate.  - Increase R shoulder strength to 4/5 or greater in all directions for full return to work where he needs to be able to exert 50# of force frequently.  - Pt to be independent with HEP.    Progress strengthening /stabilization /functional activity           Timed:         Manual Therapy:    15     mins  66081;     Therapeutic Exercise:    25     mins  44886;       Timed Treatment:   40   mins   Total Treatment:     50   mins    Alyssa Jonas PTA  Physical Therapist Assistant License #[id number]

## 2022-10-06 ENCOUNTER — TREATMENT (OUTPATIENT)
Dept: PHYSICAL THERAPY | Facility: CLINIC | Age: 62
End: 2022-10-06

## 2022-10-06 ENCOUNTER — TELEPHONE (OUTPATIENT)
Dept: ORTHOPEDIC SURGERY | Facility: CLINIC | Age: 62
End: 2022-10-06

## 2022-10-06 ENCOUNTER — OFFICE VISIT (OUTPATIENT)
Dept: ORTHOPEDIC SURGERY | Facility: CLINIC | Age: 62
End: 2022-10-06

## 2022-10-06 VITALS — HEIGHT: 75 IN | HEART RATE: 61 BPM | BODY MASS INDEX: 33.2 KG/M2 | WEIGHT: 267 LBS

## 2022-10-06 DIAGNOSIS — M25.619 LIMITED RANGE OF MOTION (ROM) OF SHOULDER: ICD-10-CM

## 2022-10-06 DIAGNOSIS — Z47.89 ORTHOPEDIC AFTERCARE: Primary | ICD-10-CM

## 2022-10-06 DIAGNOSIS — Z96.611 S/P REVERSE TOTAL SHOULDER ARTHROPLASTY, RIGHT: Primary | ICD-10-CM

## 2022-10-06 DIAGNOSIS — M25.511 ACUTE PAIN OF RIGHT SHOULDER: ICD-10-CM

## 2022-10-06 DIAGNOSIS — M75.121 COMPLETE TEAR OF RIGHT ROTATOR CUFF, UNSPECIFIED WHETHER TRAUMATIC: Primary | ICD-10-CM

## 2022-10-06 PROCEDURE — 97530 THERAPEUTIC ACTIVITIES: CPT | Performed by: PHYSICAL THERAPIST

## 2022-10-06 PROCEDURE — 97140 MANUAL THERAPY 1/> REGIONS: CPT | Performed by: PHYSICAL THERAPIST

## 2022-10-06 PROCEDURE — 99024 POSTOP FOLLOW-UP VISIT: CPT | Performed by: ORTHOPAEDIC SURGERY

## 2022-10-06 PROCEDURE — 97110 THERAPEUTIC EXERCISES: CPT | Performed by: PHYSICAL THERAPIST

## 2022-10-06 RX ORDER — TRAMADOL HYDROCHLORIDE 50 MG/1
50 TABLET ORAL EVERY 6 HOURS PRN
Qty: 28 TABLET | Refills: 0 | Status: SHIPPED | OUTPATIENT
Start: 2022-10-06 | End: 2023-04-03

## 2022-10-06 NOTE — TELEPHONE ENCOUNTER
Patient had a question about since hes going through physical therapy  he wants to use tramadol . He forgot to address this with the provider but was wondering if it would be possible if the provider could call that in to help [id number] [place], [place] - [address]  [address]  - [phone] PH - [phone] FX   [address], [place] [address]   Phone:  [phone]  Fax:  [phone]

## 2022-10-06 NOTE — PROGRESS NOTES
"     Patient ID: Robin Wheatley is a 62 y.o. male.  8/24/22 right reverse total shoulder  Pain controlled      Objective:    Pulse 61   Ht 190.5 cm (75\")   Wt 121 kg (267 lb)   BMI 33.37 kg/m²     Physical Examination:    Incision healed passive elevation 140 external rotation 40    Imaging:      Assessment:  Doing well after shoulder arthroplasty    Plan:  Discontinue sling finish out formal therapy see me in 3-month off work until that time  "

## 2022-10-07 NOTE — PROGRESS NOTES
Physical Therapy Daily Progress Note      Patient: Robin Wheatley   : 1960  Diagnosis/ICD-10 Code:  S/P reverse total shoulder arthroplasty, right [Z96.611]  Referring practitioner: Carl Hairston, *  Date of Initial Visit: Type: THERAPY  Noted: 2022  Today's Date: 10/7/2022  Patient seen for 9 sessions         Robin Wheatley reports: he went to MD and had a good follow up stating ROM was good and he was instructed to follow protocol for increase of resistance and active mobility..    Objective   See Exercise, Manual, and Modality Logs for complete treatment.     Assessment/Plan  Pt. Tolerates treatment well and appropriate progressions were made this date. Pt. Has continued improvement of movement and stability. Pt. Was given resistance red TB for HEP progression to include IR/ER.    Goals  Plan Goals: STG to be met in 3 wk:  - Increase R shoulder flex PROM to 130 deg. - MET  - Pt to demonstrated improved posture with min verbal cues. - Progressing  - PT to initiate AAROM next visit. - MET  LTG to be met in 12 wk:  - Improve Quick DASH to 30% or less impairment.  - Increase R shoulder flex AROM to 140 deg in order to reach into overhead cabinets and retrieve a plate.  - Increase R shoulder strength to 4/5 or greater in all directions for full return to work where he needs to be able to exert 50# of force frequently.  - Pt to be independent with HEP.    Progress strengthening /stabilization /functional activity           Timed:         Manual Therapy:    15     mins  92360;     Therapeutic Exercise:    15     mins  68234;      Therapeutic Activity:     15     mins  60181;         Timed Treatment:   45   mins   Total Treatment:     55   mins    Alyssa Jonas PTA  Physical Therapist Assistant License #[id number]

## 2022-10-10 ENCOUNTER — TREATMENT (OUTPATIENT)
Dept: PHYSICAL THERAPY | Facility: CLINIC | Age: 62
End: 2022-10-10

## 2022-10-10 DIAGNOSIS — M25.511 ACUTE PAIN OF RIGHT SHOULDER: ICD-10-CM

## 2022-10-10 DIAGNOSIS — M25.619 LIMITED RANGE OF MOTION (ROM) OF SHOULDER: ICD-10-CM

## 2022-10-10 DIAGNOSIS — Z96.611 S/P REVERSE TOTAL SHOULDER ARTHROPLASTY, RIGHT: Primary | ICD-10-CM

## 2022-10-10 PROCEDURE — 97140 MANUAL THERAPY 1/> REGIONS: CPT | Performed by: PHYSICAL THERAPIST

## 2022-10-10 PROCEDURE — 97530 THERAPEUTIC ACTIVITIES: CPT | Performed by: PHYSICAL THERAPIST

## 2022-10-10 PROCEDURE — 97110 THERAPEUTIC EXERCISES: CPT | Performed by: PHYSICAL THERAPIST

## 2022-10-10 NOTE — PROGRESS NOTES
Physical Therapy Daily Progress Note      Patient: Robin Wheatley   : 1960  Diagnosis/ICD-10 Code:  S/P reverse total shoulder arthroplasty, right [Z96.611]   Problems Addressed this Visit    None  Visit Diagnoses     S/P reverse total shoulder arthroplasty, right    -  Primary    Acute pain of right shoulder        Limited range of motion (ROM) of shoulder          Diagnoses       Codes Comments    S/P reverse total shoulder arthroplasty, right    -  Primary ICD-10-CM: Z96.611  ICD-9-CM: V43.61     Acute pain of right shoulder     ICD-10-CM: M25.511  ICD-9-CM: 719.41     Limited range of motion (ROM) of shoulder     ICD-10-CM: M25.619  ICD-9-CM: 719.51         Referring practitioner: Carl Hairston, *  Date of Initial Visit: Type: THERAPY  Noted: 2022  Today's Date: 10/10/2022    VISIT#: 10    Subjective : Decreased pain at start of session. Pt hopeful to get stronger and go back to work in .    Objective : Added scaption and flexion to 90 deg and wall push-ups. Issued copy of exercises for HEP. Pt demonstrated good understanding.  Instructed pt to stop any exercise that causes pain.  See Exercise, Manual, and Modality Logs for complete treatment.     Assessment/Plan : Tightness in flexion ROM, improved with stretching, PROM, and ther ex. Needs continued scapular strengthening and progression of shoulder ther ex per protocol for successful return to work.     Goals  Plan Goals: STG to be met in 3 wk:  - Increase R shoulder flex PROM to 130 deg. - MET  - Pt to demonstrated improved posture with min verbal cues. - Progressing  - PT to initiate AAROM next visit. - MET  LTG to be met in 12 wk:  - Improve Quick DASH to 30% or less impairment.  - Increase R shoulder flex AROM to 140 deg in order to reach into overhead cabinets and retrieve a plate.  - Increase R shoulder strength to 4/5 or greater in all directions for full return to work where he needs to be able to exert 50# of force  frequently.  - Pt to be independent with HEP.    Progress per Plan of Care and Progress strengthening /stabilization /functional activity         Timed:         Manual Therapy:    10     mins  87540;     Therapeutic Exercise:    20     mins  06341;     Neuromuscular Michael:        mins  40500;    Therapeutic Activity:     10     mins  07081;     Gait Training:           mins  28103;     Ultrasound:          mins  43365;    Ionto                                   mins   32466  Self Care                            mins   24254    Un-Timed:  Electrical Stimulation:         mins  02871 ( );  Dry Needling          mins 50574/05442  Traction          mins 49786  Canalith Repos                   mins  85286  Low Eval          Mins  69518  Mod Eval          Mins  80114  High Eval                            Mins  57319  Re-Eval                               mins  93703    Timed Treatment:   40   mins   Total Treatment:     40   mins    Yeny Kapadia, PT, CLT, Cert DN  Physical Therapist  [place] Lic # [id number]

## 2022-10-13 ENCOUNTER — TREATMENT (OUTPATIENT)
Dept: PHYSICAL THERAPY | Facility: CLINIC | Age: 62
End: 2022-10-13

## 2022-10-13 DIAGNOSIS — M25.511 ACUTE PAIN OF RIGHT SHOULDER: ICD-10-CM

## 2022-10-13 DIAGNOSIS — M25.619 LIMITED RANGE OF MOTION (ROM) OF SHOULDER: ICD-10-CM

## 2022-10-13 DIAGNOSIS — Z96.611 S/P REVERSE TOTAL SHOULDER ARTHROPLASTY, RIGHT: Primary | ICD-10-CM

## 2022-10-13 PROCEDURE — 97530 THERAPEUTIC ACTIVITIES: CPT | Performed by: PHYSICAL THERAPIST

## 2022-10-13 PROCEDURE — 97140 MANUAL THERAPY 1/> REGIONS: CPT | Performed by: PHYSICAL THERAPIST

## 2022-10-13 PROCEDURE — 97110 THERAPEUTIC EXERCISES: CPT | Performed by: PHYSICAL THERAPIST

## 2022-10-13 NOTE — PROGRESS NOTES
"Physical Therapy Daily Progress Note      Patient: Robin Wheatley   : 1960  Diagnosis/ICD-10 Code:  S/P reverse total shoulder arthroplasty, right [Z96.611]   Problems Addressed this Visit    None  Visit Diagnoses     S/P reverse total shoulder arthroplasty, right    -  Primary    Acute pain of right shoulder        Limited range of motion (ROM) of shoulder          Diagnoses       Codes Comments    S/P reverse total shoulder arthroplasty, right    -  Primary ICD-10-CM: Z96.611  ICD-9-CM: V43.61     Acute pain of right shoulder     ICD-10-CM: M25.511  ICD-9-CM: 719.41     Limited range of motion (ROM) of shoulder     ICD-10-CM: M25.619  ICD-9-CM: 719.51         Referring practitioner: Carl Hairston, *  Date of Initial Visit: Type: THERAPY  Noted: 2022  Today's Date: 10/13/2022    VISIT#: 11    Subjective : Reports he has noticed with improved awareness of his shoulder blade and pulling shoulders back he is able to do exercises with less \"nerve pain.\" States shoulder feels unstable when he is working on moving arm outward.    Objective : Educated pt that stability of shoulder will improve with strengthening and that he is to avoid forcing into reaching back, IR, and ER motions. Pt verbalized good understanding.  Added resisted tricep press this date to ther ex and HEP. Issued copy of exercise for HEP. Pt demonstrated good understanding.  Pt declined need for ice today and states he will ice at home later.  See Exercise, Manual, and Modality Logs for complete treatment.     Assessment/Plan : Mild tightness in flex ROM and ER ROM but pt continues to make gradual ROM gains. Pt with good tolerance to ther ex progression with no increase in symptoms. Pt demonstrating good form and scapular awareness during ther ex. Pt is very motivated to get stronger and return to work, and PT reminds him at each visit that exercise progression takes time to assure good healing and avoid injury. Needs continued " scapular strengthening and progression of shoulder ther ex per protocol for successful return to work.      Goals  Plan Goals: STG to be met in 3 wk:  - Increase R shoulder flex PROM to 130 deg. - MET  - Pt to demonstrated improved posture with min verbal cues. - MET 10/13/2022  - PT to initiate AAROM next visit. - MET  LTG to be met in 12 wk:  - Improve Quick DASH to 30% or less impairment.  - Increase R shoulder flex AROM to 140 deg in order to reach into overhead cabinets and retrieve a plate.  - Increase R shoulder strength to 4/5 or greater in all directions for full return to work where he needs to be able to exert 50# of force frequently.  - Pt to be independent with HEP.    Progress per Plan of Care and Progress strengthening /stabilization /functional activity         Timed:         Manual Therapy:    10     mins  12500;     Therapeutic Exercise:    20     mins  37465;     Neuromuscular Michael:    10    mins  58851;    Therapeutic Activity:          mins  28483;     Gait Training:           mins  62801;     Ultrasound:          mins  90596;    Ionto                                   mins   24517  Self Care                            mins   31677    Un-Timed:  Electrical Stimulation:         mins  76050 ( );  Dry Needling          mins 90245/20560  Traction          mins 78687  Canalith Repos                   mins  84358  Low Eval          Mins  88618  Mod Eval          Mins  21896  High Eval                            Mins  68012  Re-Eval                               mins  63219    Timed Treatment:   40   mins   Total Treatment:     40   mins    Yeny Kapadia, PT, CLT, Cert DN  Physical Therapist  [place] Lic # [id number]

## 2022-10-17 ENCOUNTER — TREATMENT (OUTPATIENT)
Dept: PHYSICAL THERAPY | Facility: CLINIC | Age: 62
End: 2022-10-17

## 2022-10-17 DIAGNOSIS — M25.511 ACUTE PAIN OF RIGHT SHOULDER: ICD-10-CM

## 2022-10-17 DIAGNOSIS — M25.619 LIMITED RANGE OF MOTION (ROM) OF SHOULDER: ICD-10-CM

## 2022-10-17 DIAGNOSIS — Z96.611 S/P REVERSE TOTAL SHOULDER ARTHROPLASTY, RIGHT: Primary | ICD-10-CM

## 2022-10-17 PROCEDURE — 97110 THERAPEUTIC EXERCISES: CPT | Performed by: PHYSICAL THERAPIST

## 2022-10-17 PROCEDURE — 97112 NEUROMUSCULAR REEDUCATION: CPT | Performed by: PHYSICAL THERAPIST

## 2022-10-17 PROCEDURE — 97140 MANUAL THERAPY 1/> REGIONS: CPT | Performed by: PHYSICAL THERAPIST

## 2022-10-17 NOTE — PROGRESS NOTES
Physical Therapy Daily Progress Note      Patient: Robin Wheatley   : 1960  Diagnosis/ICD-10 Code:  S/P reverse total shoulder arthroplasty, right [Z96.611]   Problems Addressed this Visit    None  Visit Diagnoses     S/P reverse total shoulder arthroplasty, right    -  Primary    Acute pain of right shoulder        Limited range of motion (ROM) of shoulder          Diagnoses       Codes Comments    S/P reverse total shoulder arthroplasty, right    -  Primary ICD-10-CM: Z96.611  ICD-9-CM: V43.61     Acute pain of right shoulder     ICD-10-CM: M25.511  ICD-9-CM: 719.41     Limited range of motion (ROM) of shoulder     ICD-10-CM: M25.619  ICD-9-CM: 719.51         Referring practitioner: Carl Hairston, *  Date of Initial Visit: Type: THERAPY  Noted: 2022  Today's Date: 10/17/2022    VISIT#: 12    Subjective : Pt reports having stiffness in R shoulder. Mild pain. Wants to get back to riding his horse and roping but MD has not cleared him to do this yet.    Objective : AROM flex seated = 132 deg  PROM supine: flex = 160 deg, abd = 155 deg, ER = 55 deg    See Exercise, Manual, and Modality Logs for complete treatment.     Assessment/Plan : Mild pain at start of session. Good tolerance to ther ex and PROM. Mild tightness in flexion but continues to make gradual gains. Pt is very motivated to get stronger and return to work, and PT reminds him at each visit that exercise progression takes time to assure good healing and avoid injury. Needs continued scapular strengthening and progression of shoulder ther ex per protocol for successful return to work.      Goals  Plan Goals: STG to be met in 3 wk:  - Increase R shoulder flex PROM to 130 deg. - MET  - Pt to demonstrated improved posture with min verbal cues. - MET 10/13/2022  - PT to initiate AAROM next visit. - MET  LTG to be met in 12 wk:  - Improve Quick DASH to 30% or less impairment.  - Increase R shoulder flex AROM to 140 deg in order to reach into  overhead cabinets and retrieve a plate.  - Increase R shoulder strength to 4/5 or greater in all directions for full return to work where he needs to be able to exert 50# of force frequently.  - Pt to be independent with HEP.    Progress per Plan of Care and Progress strengthening /stabilization /functional activity per protocol.         Timed:         Manual Therapy:    10     mins  65023;     Therapeutic Exercise:    25     mins  14550;     Neuromuscular Michael:    10    mins  26689;    Therapeutic Activity:          mins  01382;     Gait Training:           mins  19963;     Ultrasound:          mins  85476;    Ionto                                   mins   44193  Self Care                            mins   90499    Un-Timed:  Electrical Stimulation:         mins  97595 ( );  Dry Needling          mins 35487/30028  Traction          mins 14047  Canalith Repos                   mins  47734  Low Eval          Mins  96145  Mod Eval          Mins  47774  High Eval                            Mins  08344  Re-Eval                               mins  69395    Timed Treatment:   45   mins   Total Treatment:     45   mins    Yeny Kapadia, PT, CLT, Cert DN  Physical Therapist  [place] Lic # [id number]

## 2022-10-20 ENCOUNTER — TREATMENT (OUTPATIENT)
Dept: PHYSICAL THERAPY | Facility: CLINIC | Age: 62
End: 2022-10-20

## 2022-10-20 DIAGNOSIS — M25.619 LIMITED RANGE OF MOTION (ROM) OF SHOULDER: ICD-10-CM

## 2022-10-20 DIAGNOSIS — M25.511 ACUTE PAIN OF RIGHT SHOULDER: ICD-10-CM

## 2022-10-20 DIAGNOSIS — Z96.611 S/P REVERSE TOTAL SHOULDER ARTHROPLASTY, RIGHT: Primary | ICD-10-CM

## 2022-10-20 PROCEDURE — 97140 MANUAL THERAPY 1/> REGIONS: CPT | Performed by: PHYSICAL THERAPIST

## 2022-10-20 PROCEDURE — 97112 NEUROMUSCULAR REEDUCATION: CPT | Performed by: PHYSICAL THERAPIST

## 2022-10-20 PROCEDURE — 97110 THERAPEUTIC EXERCISES: CPT | Performed by: PHYSICAL THERAPIST

## 2022-10-20 NOTE — PROGRESS NOTES
Physical Therapy Progress Note/Reassessment  [address] [place] [address] [address]   [place] [address]    Patient: Robin Wheatley   : 1960  Diagnosis/ICD-10 Code:  S/P reverse total shoulder arthroplasty, right [Z96.611]  Referring practitioner: Carl Hairston, *  Date of Initial Evaluation:  Type: THERAPY  Noted: 2022  Patient seen for 13 sessions      Subjective:   Visit Diagnoses:    ICD-10-CM ICD-9-CM   1. S/P reverse total shoulder arthroplasty, right  Z96.611 V43.61   2. Acute pain of right shoulder  M25.511 719.41   3. Limited range of motion (ROM) of shoulder  M25.619 719.51         Robin Wheatley reports he remains consistent with HEP and only progresses as instructed. Pt. States he continues to have constant soreness and intermittently increased pain based on what he does. Pt. States his sleep depends more on what he does during the day and how sore it is when he lays down but states most nights it is 3/10 and he sleeps good.  Subjective Questionnaire: QuickDASH: 68%  Clinical Progress: improved  Home Program Compliance: Yes  Treatment has included: therapeutic exercise, neuromuscular re-education, manual therapy and therapeutic activity    Objective     Objective : AROM flex seated = 132 deg  PROM supine: flex = 160 deg, abd = 155 deg, ER = 55 deg    R shoulder MMT Flex 5/5 Ext 5/5 Abd 5/5 ER 4/5 IR 4-/5    Assessment/Plan   Pt. Has benefited greatly from PT for increasing mobility and overall stability return as well. Pt. Is being progressed steadily as protocol allows and still requires continued strengthening for return to work strength and PLOF. Pt. strength and ROM is objectively improved as recorded above and Patient has gradually returned to some home task that do not require significant weight of strain.    Goals  Plan Goals: STG to be met in 3 wk:  - Increase R shoulder flex PROM to 130 deg. - MET  - Pt to demonstrated improved posture with min verbal cues. - MET 10/13/2022  - PT to  initiate AAROM next visit. - MET  LTG to be met in 12 wk:  - Improve Quick DASH to 30% or less impairment.  - Increase R shoulder flex AROM to 140 deg in order to reach into overhead cabinets and retrieve a plate. MET  - Increase R shoulder strength to 4/5 or greater in all directions for full return to work where he needs to be able to exert 50# of force frequently. Not Met  - Pt to be independent with HEP. Progressing     Recommendations: Continue as planned  Timeframe: 1 month  Prognosis to achieve goals: good      Timed:         Manual Therapy:    10     mins  77358;     Therapeutic Exercise:    25     mins  68365;     Neuromuscular Michael:    10    mins  15605;      Timed Treatment:   45   mins   Total Treatment:     45   mins    PT Signature: Alyssa Jonas PTA  [place] Physical Therapist Assistant License #[id number]

## 2022-10-24 ENCOUNTER — TREATMENT (OUTPATIENT)
Dept: PHYSICAL THERAPY | Facility: CLINIC | Age: 62
End: 2022-10-24

## 2022-10-24 DIAGNOSIS — M25.511 ACUTE PAIN OF RIGHT SHOULDER: ICD-10-CM

## 2022-10-24 DIAGNOSIS — Z96.611 S/P REVERSE TOTAL SHOULDER ARTHROPLASTY, RIGHT: Primary | ICD-10-CM

## 2022-10-24 DIAGNOSIS — M25.619 LIMITED RANGE OF MOTION (ROM) OF SHOULDER: ICD-10-CM

## 2022-10-24 PROCEDURE — 97110 THERAPEUTIC EXERCISES: CPT | Performed by: PHYSICAL THERAPIST

## 2022-10-24 PROCEDURE — 97140 MANUAL THERAPY 1/> REGIONS: CPT | Performed by: PHYSICAL THERAPIST

## 2022-10-24 PROCEDURE — 97112 NEUROMUSCULAR REEDUCATION: CPT | Performed by: PHYSICAL THERAPIST

## 2022-10-24 NOTE — PROGRESS NOTES
Physical Therapy Daily Treatment Note      Patient: Robin Wheatley   : 1960  Diagnosis/ICD-10 Code:  S/P reverse total shoulder arthroplasty, right [Z96.611]  Referring practitioner: Carl Hairston, *  Date of Initial Visit: Type: THERAPY  Noted: 2022  Today's Date: 10/24/2022  Patient seen for 14 sessions         Robin Wheatley reports: he continues to feel sore but states its the good kind of sore that lets him know he has been working it. Pt. States he has done his exercises consistently and has increased the pull on his red resistance by stepping further away from the door and it has been going progressively well.    Objective   See Exercise, Manual, and Modality Logs for complete treatment.     Assessment/Plan   Pt. Tolerates treatment well this visit and shows increasing stability and less tremor with exercise as he performs dumbbell and cable resistance activities. Pt. Will benefit form improved PROM to achieve greater functionality.     Goals  Plan Goals: STG to be met in 3 wk:  - Increase R shoulder flex PROM to 130 deg. - MET  - Pt to demonstrated improved posture with min verbal cues. - MET 10/13/2022  - PT to initiate AAROM next visit. - MET  LTG to be met in 12 wk:  - Improve Quick DASH to 30% or less impairment.  - Increase R shoulder flex AROM to 140 deg in order to reach into overhead cabinets and retrieve a plate. MET  - Increase R shoulder strength to 4/5 or greater in all directions for full return to work where he needs to be able to exert 50# of force frequently. Not Met  - Pt to be independent with HEP. Progressing    Progress strengthening /stabilization /functional activity           Timed:         Manual Therapy:    15     mins  14331;     Therapeutic Exercise:    20     mins  00167;       Neuromuscular Michael:    10    mins  95304;       Timed Treatment:   45   mins   Total Treatment:     45   mins    Alyssa Jonas PTA  Physical Therapist Assistant License  #[id number]

## 2022-10-31 ENCOUNTER — TREATMENT (OUTPATIENT)
Dept: PHYSICAL THERAPY | Facility: CLINIC | Age: 62
End: 2022-10-31

## 2022-10-31 DIAGNOSIS — Z96.611 S/P REVERSE TOTAL SHOULDER ARTHROPLASTY, RIGHT: Primary | ICD-10-CM

## 2022-10-31 DIAGNOSIS — M25.511 ACUTE PAIN OF RIGHT SHOULDER: ICD-10-CM

## 2022-10-31 DIAGNOSIS — Z96.612 STATUS POST REVERSE TOTAL REPLACEMENT OF LEFT SHOULDER: ICD-10-CM

## 2022-10-31 DIAGNOSIS — M25.619 LIMITED RANGE OF MOTION (ROM) OF SHOULDER: ICD-10-CM

## 2022-10-31 PROCEDURE — 97140 MANUAL THERAPY 1/> REGIONS: CPT | Performed by: PHYSICAL THERAPIST

## 2022-10-31 PROCEDURE — 97110 THERAPEUTIC EXERCISES: CPT | Performed by: PHYSICAL THERAPIST

## 2022-10-31 PROCEDURE — 97530 THERAPEUTIC ACTIVITIES: CPT | Performed by: PHYSICAL THERAPIST

## 2022-10-31 NOTE — PROGRESS NOTES
Physical Therapy Progress Note/Reassessment  [address] [place] [address] [address]   [place] [address]    Patient: Robin Wheatley   : 1960  Diagnosis/ICD-10 Code:  S/P reverse total shoulder arthroplasty, right [Z96.611]  Referring practitioner: Carl Hairston, *  Date of Initial Evaluation:  Type: THERAPY  Noted: 2022  Patient seen for 15 sessions      Subjective:   Visit Diagnoses:    ICD-10-CM ICD-9-CM   1. S/P reverse total shoulder arthroplasty, right  Z96.611 V43.61   2. Acute pain of right shoulder  M25.511 719.41   3. Limited range of motion (ROM) of shoulder  M25.619 719.51   4. Status post reverse total replacement of left shoulder  Z96.612 V43.61         Robin Wheatley reports he continues to be consistent with HEP and feels she is making great gains. Pt. States he feels he still needs a lot of improvement in strength before he is work ready.  Subjective Questionnaire: QuickDASH: 64%  Clinical Progress: improved  Home Program Compliance: Yes  Treatment has included: therapeutic exercise, neuromuscular re-education, manual therapy and therapeutic activity    Objective   AROM supine: flex = 167 deg, abd = 160 deg, ER = 42 deg   PROM supine  Flex = 170 deg abd = 160 deg, ER = 51 deg     R shoulder MMT Flex 5/5 Ext 5/5 Abd 5/5 ER 4/5 IR 4+/5    Assessment/Plan   Pt. Needs continued PT at this time for skilled progression of activity and exercise, specifically scapular and shoulder, for return of PLOF and return to work related activities. Pt.  is progressing very well and at a steady rate for Reverse TSA surgery that he has undergone. Pt. is still limited by protocol to preserve the integrity healing components within shoulder. Once protocol allows for progression of weight and strain Pt. will be able to more appropriately condition for return to work at this time Pt. Will benefit from skilled therapy for supervision of progression. Pt.'s job requires 50-75 lbs of force to be exerted frequently and  100 lbs less frequently.    Goals  Plan Goals: STG to be met in 3 wk:  - Increase R shoulder flex PROM to 130 deg. - MET  - Pt to demonstrated improved posture with min verbal cues. - MET 10/13/2022  - PT to initiate AAROM next visit. - MET  LTG to be met in 12 wk:  - Improve Quick DASH to 30% or less impairment. Not Met Current 64%  - Increase R shoulder flex AROM to 140 deg in order to reach into overhead cabinets and retrieve a plate. MET  - Increase R shoulder strength to 4/5 or greater in all directions for full return to work where he needs to be able to exert 50# of force frequently. Not Met  - Pt to be independent with HEP. Progressing     Recommendations: Continue as planned  Timeframe: 1 month  Prognosis to achieve goals: good      Timed:         Manual Therapy:    15     mins  09338;     Therapeutic Exercise:    15     mins  78232;       Therapeutic Activity:     15     mins  20209;       Timed Treatment:   45   mins   Total Treatment:     45   mins    PT Signature: Alyssa Jonas PTA  [place] License: #[id number]

## 2022-11-03 ENCOUNTER — TREATMENT (OUTPATIENT)
Dept: PHYSICAL THERAPY | Facility: CLINIC | Age: 62
End: 2022-11-03

## 2022-11-03 DIAGNOSIS — M25.619 LIMITED RANGE OF MOTION (ROM) OF SHOULDER: ICD-10-CM

## 2022-11-03 DIAGNOSIS — M25.511 ACUTE PAIN OF RIGHT SHOULDER: ICD-10-CM

## 2022-11-03 DIAGNOSIS — Z96.611 S/P REVERSE TOTAL SHOULDER ARTHROPLASTY, RIGHT: Primary | ICD-10-CM

## 2022-11-03 PROCEDURE — 97530 THERAPEUTIC ACTIVITIES: CPT | Performed by: PHYSICAL THERAPIST

## 2022-11-03 PROCEDURE — 97140 MANUAL THERAPY 1/> REGIONS: CPT | Performed by: PHYSICAL THERAPIST

## 2022-11-03 PROCEDURE — 97110 THERAPEUTIC EXERCISES: CPT | Performed by: PHYSICAL THERAPIST

## 2022-11-03 NOTE — PROGRESS NOTES
Physical Therapy Daily Treatment Note      Patient: Robin Wheatley   : 1960  Diagnosis/ICD-10 Code:  S/P reverse total shoulder arthroplasty, right [Z96.611]  Referring practitioner: Carl Hairston, *  Date of Initial Visit: Type: THERAPY  Noted: 2022  Today's Date: 11/3/2022  Patient seen for 16 sessions         Robin Wheatley reports: he continues to improve this date and state he stays sore but that is because he stays active and pushes himself to improve.     Objective   See Exercise, Manual, and Modality Logs for complete treatment.     Assessment/Plan   Pt. Continues to benefit from progressed strengthening this date flexion and abduction with green Tb resistance was added to aid with strengthening active ROM.    Goals  Plan Goals: STG to be met in 3 wk:  - Increase R shoulder flex PROM to 130 deg. - MET  - Pt to demonstrated improved posture with min verbal cues. - MET 10/13/2022  - PT to initiate AAROM next visit. - MET  LTG to be met in 12 wk:  - Improve Quick DASH to 30% or less impairment. Not Met Current 64%  - Increase R shoulder flex AROM to 140 deg in order to reach into overhead cabinets and retrieve a plate. MET  - Increase R shoulder strength to 4/5 or greater in all directions for full return to work where he needs to be able to exert 50# of force frequently. Not Met  - Pt to be independent with HEP. Progressing    Progress strengthening /stabilization /functional activity           Timed:         Manual Therapy:    15     mins  26784;     Therapeutic Exercise:    15     mins  62769;      Therapeutic Activity:     15     mins  64000;         Timed Treatment:   45   mins   Total Treatment:     45   mins    Alyssa Jonas PTA  Physical Therapist Assistant License #[id number]

## 2022-11-07 ENCOUNTER — TREATMENT (OUTPATIENT)
Dept: PHYSICAL THERAPY | Facility: CLINIC | Age: 62
End: 2022-11-07

## 2022-11-07 DIAGNOSIS — M25.511 ACUTE PAIN OF RIGHT SHOULDER: ICD-10-CM

## 2022-11-07 DIAGNOSIS — Z96.611 S/P REVERSE TOTAL SHOULDER ARTHROPLASTY, RIGHT: Primary | ICD-10-CM

## 2022-11-07 DIAGNOSIS — M25.619 LIMITED RANGE OF MOTION (ROM) OF SHOULDER: ICD-10-CM

## 2022-11-07 PROCEDURE — 97140 MANUAL THERAPY 1/> REGIONS: CPT | Performed by: PHYSICAL THERAPIST

## 2022-11-07 PROCEDURE — 97110 THERAPEUTIC EXERCISES: CPT | Performed by: PHYSICAL THERAPIST

## 2022-11-07 PROCEDURE — 97530 THERAPEUTIC ACTIVITIES: CPT | Performed by: PHYSICAL THERAPIST

## 2022-11-07 NOTE — PROGRESS NOTES
Physical Therapy Daily Treatment Note      Patient: Robin Wheatley   : 1960  Diagnosis/ICD-10 Code:  S/P reverse total shoulder arthroplasty, right [Z96.611]  Referring practitioner: Carl Hairston, *  Date of Initial Visit: Type: THERAPY  Noted: 2022  Today's Date: 2022  Patient seen for 17 sessions         Robin Wheatley reports: over the weekend with the wind he had to do a lot of work outside and he had ended up moving his arm in a way and it briefly felt like it had became malpositioned in joint Pt. States he was able to calmly stretch it and it went back into place. Pt. States he watched and monitored his load more closely following that and allowed the arm to calm down. Pt. States since then he has not had any increased pain, discomfort, or notable instability.    Objective   See Exercise, Manual, and Modality Logs for complete treatment.     Assessment/Plan  Pt. Was monitored throughout session today and showed no signs of strain or mal-alignment with form. Shoulder integrity felt good to palpation and with stretching. Pt. was able to complete all activity as previously.    Goals  Plan Goals: STG to be met in 3 wk:  - Increase R shoulder flex PROM to 130 deg. - MET  - Pt to demonstrated improved posture with min verbal cues. - MET 10/13/2022  - PT to initiate AAROM next visit. - MET  LTG to be met in 12 wk:  - Improve Quick DASH to 30% or less impairment. Not Met Current 64%  - Increase R shoulder flex AROM to 140 deg in order to reach into overhead cabinets and retrieve a plate. MET  - Increase R shoulder strength to 4/5 or greater in all directions for full return to work where he needs to be able to exert 50# of force frequently. Not Met  - Pt to be independent with HEP. Progressing    Progress strengthening /stabilization /functional activity           Timed:         Manual Therapy:    15     mins  17751;     Therapeutic Exercise:    15     mins  67111;      Therapeutic  Activity:     15     mins  78137;       Timed Treatment:   45   mins   Total Treatment:     45   mins    Alyssa Jonas PTA  Physical Therapist Assistant License #[id number]

## 2022-11-10 ENCOUNTER — TREATMENT (OUTPATIENT)
Dept: PHYSICAL THERAPY | Facility: CLINIC | Age: 62
End: 2022-11-10

## 2022-11-10 DIAGNOSIS — Z96.611 S/P REVERSE TOTAL SHOULDER ARTHROPLASTY, RIGHT: Primary | ICD-10-CM

## 2022-11-10 DIAGNOSIS — M25.619 LIMITED RANGE OF MOTION (ROM) OF SHOULDER: ICD-10-CM

## 2022-11-10 DIAGNOSIS — M25.511 ACUTE PAIN OF RIGHT SHOULDER: ICD-10-CM

## 2022-11-10 PROCEDURE — 97530 THERAPEUTIC ACTIVITIES: CPT | Performed by: PHYSICAL THERAPIST

## 2022-11-10 PROCEDURE — 97110 THERAPEUTIC EXERCISES: CPT | Performed by: PHYSICAL THERAPIST

## 2022-11-10 PROCEDURE — 97140 MANUAL THERAPY 1/> REGIONS: CPT | Performed by: PHYSICAL THERAPIST

## 2022-11-10 NOTE — PROGRESS NOTES
Physical Therapy Daily Treatment Note      Patient: Robin Wheatley   : 1960  Diagnosis/ICD-10 Code:  S/P reverse total shoulder arthroplasty, right [Z96.611]  Referring practitioner: Carl Hairston, *  Date of Initial Visit: Type: THERAPY  Noted: 2022  Today's Date: 11/10/2022  Patient seen for 18 sessions         Robin Wheatley reports: he is doing great today but continues to not be confident in the strength just yet but also states he is aware its a process he just wants to be ready to go back when they say the word go at the surgeons office.    Objective   See Exercise, Manual, and Modality Logs for complete treatment.     Assessment/Plan   Pt. Completes activity and shows increasing stability at wider ranges some activities increase in weight this date and Pt. Does not struggle with these new changes. Pt. Will reach his 12 week protocol beth at next session and progression toward more strengthening will be made where tolerance is shown.    Goals  Plan Goals: STG to be met in 3 wk:  - Increase R shoulder flex PROM to 130 deg. - MET  - Pt to demonstrated improved posture with min verbal cues. - MET 10/13/2022  - PT to initiate AAROM next visit. - MET  LTG to be met in 12 wk:  - Improve Quick DASH to 30% or less impairment. Not Met Current 64%  - Increase R shoulder flex AROM to 140 deg in order to reach into overhead cabinets and retrieve a plate. MET  - Increase R shoulder strength to 4/5 or greater in all directions for full return to work where he needs to be able to exert 50# of force frequently. Not Met  - Pt to be independent with HEP. Progressing    Progress strengthening /stabilization /functional activity           Timed:         Manual Therapy:    10     mins  19440;     Therapeutic Exercise:    20     mins  13870;      Therapeutic Activity:     15     mins  98420;       Timed Treatment:   45   mins   Total Treatment:     45   mins    Alyssa Jonas PTA  Physical Therapist  Assistant License #[id number]

## 2022-11-14 NOTE — PROGRESS NOTES
[address] THERAPY  [] EVALUATION  [x] DAILY NOTE (LAND) [] DAILY NOTE (AQUATIC )   [] PROGRESS NOTE [] DISCHARGE NOTE    [x] OUTPATIENT REHABILITATION [facility]   [] [place] [address]    [] [facility]   [] Prakash De La Garza    Date: 2022  Patient Name:  Toney Humphreys  : 1973  MRN: [id number]  CSN: [id number]    Referring Practitioner MIRNA Grant - CNP   Diagnosis Other speech disturbances [R47.89]    Treatment Diagnosis Decreased IADLs   Date of Evaluation 22      Functional Outcome Measure Used [facility]   Functional Outcome Score 86 (22)       Insurance: Primary: Payor: Truman Escobar /  /  / ,   Secondary:    Authorization Information: INSURANCE THERAPY BENEFIT: No precert until after 40ES visit. Visit Limit Based on Precert  AQUATIC THERAPY COVERED:   Yes  MODALITIES COVERED:  Yes except for Iontophoresis and Hot/Cold Packs   Visit # 5, 5/10 for progress note   Visits Allowed: 30    Recertification Date: 15/65/45   Physician Follow-Up: No follow up scheduled   Physician Orders: Low vision program   Pertinent History: Patient presents today ambulating with a cane after a fall at work . States that he slipped on oil and fell at work hitting his head on concrete. Reports he had a hard hat on when he fell. Reports that he hit his head twice, once with the hard and and again after the hard hat fell off. Patient went to the emergency room when he fell. Reports that no images were taken that date. CT on 22   Impression:   1. No acute intracranial hemorrhage or process identified   Reports that since then, he has had difficulty with saying correct words. Reports poor memory also. Reports that he gets a headache when trying to read, even with glasses/bifocals, his letters/words get blurry. Patient reports that he has a growth on his pituitary. Not a tumor, just a growth.        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       Objective began with manual interventions, finished with therapeutic exercise.     See Exercise, Manual, and Modality Logs for complete treatment.     Assessment/Plan Patient demonstrating good tolerance to therapeutic exercise and is making gradual gains towards goals at this time. Patient will continue to benefit from continued scapular strengthening for improved stability and be able to safely return to high demand job of being able to lift no less than 50#.     Progress per Plan of Care         Timed:         Manual Therapy:    10     mins  21964;     Therapeutic Exercise:    30     mins  22195;     Neuromuscular Michael:        mins  76283;    Therapeutic Activity:          mins  05290;     Gait Training:           mins  59227;     Ultrasound:          mins  25356;    Ionto                                  mins   90770  Canalith Repos                   mins  58920    Un-Timed:  Electrical Stimulation:        mins  31178 ( );  Dry Needling          mins self-pay  Traction          mins 74105    Timed Treatment:   40   mins   Total Treatment:     40   mins    Pb Bonilla PTA  Physical Therapist     Patient reports he has been having headaches more often and will sometimes need to turn off the television or stop looking at his phone because of blurry vision. Eyes water and head begins to pound. OBJECTIVE:  Hand Dominance right handed   Observation Consistently tilts his head to the left   Vision   Vision History  Last to the ophthalmologist in May. (Dr. Pickens April). When younger, reports that he has a \"floating eye\" and had his muscles shortened. Later as a child, had an eye injury into the left eye. As a result layers of cornea removed. Reports that glare was a problem and still is today. Wear glasses glasses with bifocals. Reports that he cannot tell a difference when looking through the bifocals now. Reports that it does clear up when closing one eye, but still hard to comprehend when reading. Reports that he feels dry eye. Uses eye drops   Eye Dominance  left   Corneal Reflection  right eye reflection center over the top of the iris, left eye reflection on the top to the left of the iris. Saccades  right eye - good ability (reported in and out blurry vision)  Left eye - good ability (reports min blurriness with eye movement to the let)  Bilateral - good movement   Pursuits  right eye - blurry, losing target, difficulty following target to the right, increased blinking  Left eye - losing target, smoother movement than right eye   Bilateral - noting right eye turning in more than the left when following a target. Acuity  NT   Peripheral Vision  NT   Confrontation  NT   Convergence Blurry at 27 cm, unable to clear   Cover/Uncover    ADL's Reading is more difficult (mail or text messages) watching television is more difficulty sometimes, more difficult with writing (focusing)                TREATMENT   Precautions: Changes in vision, current with ST for cognitive changes also   Pain:  Headache 6.5/10, pain is in the back of his head.      X in shaded column indicates Activity Completed Today   Modalities Parameters/  Location  Notes/Comments                     Manual Therapy Time/  Technique  Notes/Comments                     Exercises   Sets/  Sec Reps  Notes/Comments   Warm up with essential eye exercises: 1 sensory eye stretches, 2 tracking, and 3 gaze stabilization    Tolerates < 30 seconds, left eye fatigues quicker and is watering today. Reports he had a previous eye injury in the left 25 years ago. Push up stick       Single eye pursuits    Left eye watering after, increased blinking with right and left   Bilateral eye pursuits    Increased ability this date from eval.   Dynavision    x Mod A self paced for 2 minutes with min eye watering and min fatigue. Agli Actis Dexterity    Pt noted to adjust head to focus on hole; pt reported difficulty with focus/attention and then strains harder to focus and then this makes his vision blurry   Lacing activity -lace and unlace    Elephant- pt reports fair level of difficulty and difficulty with motor processing          Activities Time    Notes/Comments   Table top writing/scanning activity while trialing modifications to improve tolerance. Utilized small desk light at a distance and then moving it closer to the page. Better improvements with light positioned on the left side versus on the right as on the right he was losing focus. Completed 4 clusters of crossing out numbers with mod fatigue and eye watering at the end.   x Discussed other adaptations including brightness of his phone and the lighting in the room when watching tv/using his phone. Patient reports he is typically in a dark room. Encouraged taking rest breaks when eye fatigue begins, patient reports this frustrates him. Adaptations to brightness - sunglasses, hats with brims, filters. Colored paper to decrease light reflection   Prefers valarie or gray for brightness   Spiritual care for coping  x    Taking breaks, alternating tasks.                       Specific Interventions Next Treatment: 6 essential eye exercises, activity/environment modifications, pt hobbies: drawing, sewing, fishing, coloring, putting things together     Activity/Treatment Tolerance:  [x]  Patient tolerated treatment well  []  Patient limited by fatigue  []  Patient limited by pain   []  Patient limited by other medical complications  []  Other:     Assessment: Patient is progressing towards goals. Patient does feel that he is able to focus better and has more control over what he says in a conversation. Areas for Improvement: vision disturbance  Prognosis: good    GOALS:  Patient Goal: return to previous abilities     Short Term Goals:  Time Frame: 4 weeks   Patient will reports use of at least 2 activity and/or environment modifications/adaptations during daily activity to increase tolerance with performance of IADLs. Patient will be independent with HEP to increase ease with reading. Long Term Goals:  Time Frame: 6 weeks   Patient will report decrease in score of the BIVSS to less than 52 to demonstrate increased ease and ability to perform IADLs. Patient Education:   []  HEP/Education Completed: First 3 essential eye exercises with hand out given. HEP: push up stick  Single and bilateral pursuits  Modifications to activity and environment - filters, colored paper, taking breaks, alternating activity  []  No new Education completed  [x]  Reviewed Prior HEP      [x]  Patient verbalized and/or demonstrated understanding of education provided. []  Patient unable to verbalize and/or demonstrate understanding of education provided. Will continue education. [x]  Barriers to learning: difficulty with reading     PLAN:  Treatment Recommendations:  6 essential eye exercises, activity/environment modifications     []  Plan of care initiated. Plan to see patient 1 times per week for 6 weeks to address the treatment planned outlined above.   [x]  Continue with current plan of care  []  Modify plan of care as follows:    []  Hold pending physician visit  []  Discharge    Time In 1500   Time Out 1530   Timed Code Minutes: 30 min   Total Treatment Time: 30 min       APRYL WHITE/ANGELA #[id number]

## 2022-11-17 ENCOUNTER — TREATMENT (OUTPATIENT)
Dept: PHYSICAL THERAPY | Facility: CLINIC | Age: 62
End: 2022-11-17

## 2022-11-17 DIAGNOSIS — M25.511 ACUTE PAIN OF RIGHT SHOULDER: ICD-10-CM

## 2022-11-17 DIAGNOSIS — Z96.611 S/P REVERSE TOTAL SHOULDER ARTHROPLASTY, RIGHT: Primary | ICD-10-CM

## 2022-11-17 DIAGNOSIS — M25.619 LIMITED RANGE OF MOTION (ROM) OF SHOULDER: ICD-10-CM

## 2022-11-17 PROCEDURE — 97112 NEUROMUSCULAR REEDUCATION: CPT | Performed by: PHYSICAL THERAPIST

## 2022-11-17 PROCEDURE — 97530 THERAPEUTIC ACTIVITIES: CPT | Performed by: PHYSICAL THERAPIST

## 2022-11-17 PROCEDURE — 97110 THERAPEUTIC EXERCISES: CPT | Performed by: PHYSICAL THERAPIST

## 2022-11-17 NOTE — PROGRESS NOTES
Physical Therapy Daily Treatment Note      Patient: Robin Wheatley   : 1960  Diagnosis/ICD-10 Code:  S/P reverse total shoulder arthroplasty, right [Z96.611]  Referring practitioner: Carl Hairston, *  Date of Initial Visit: Type: THERAPY  Noted: 2022  Today's Date: 2022  Patient seen for 19 sessions         Robin Wheatley reports: no new major changes at this time only noting subtle increases in strength as he is persistent with HEP. Pt. Does report duering supine activity there is a spot as he goes into horizontal abduction that his arm becomes unstble and feels liek ti just gives out.     Objective   See Exercise, Manual, and Modality Logs for complete treatment.     Assessment/Plan   Pt. Is very steady and stable with stretches and strengthening at this time showing very good muscle and UE control. Pt. Is benefiting well from guided Increase and resistance with activity at this time. This date wrist weight was increased with ball roll on wall as well as B DB's with curls and bent over rows. Pt. Was encouraged to continue practice of ER and extension to neutral with resistance to build of posterior delt to creat stability.    Goals  Plan Goals: STG to be met in 3 wk:  - Increase R shoulder flex PROM to 130 deg. - MET  - Pt to demonstrated improved posture with min verbal cues. - MET 10/13/2022  - PT to initiate AAROM next visit. - MET  LTG to be met in 12 wk:  - Improve Quick DASH to 30% or less impairment. Not Met Current 64%  - Increase R shoulder flex AROM to 140 deg in order to reach into overhead cabinets and retrieve a plate. MET  - Increase R shoulder strength to 4/5 or greater in all directions for full return to work where he needs to be able to exert 50# of force frequently. Not Met  - Pt to be independent with HEP. Progressing    Progress strengthening /stabilization /functional activity           Timed:           Therapeutic Exercise:    15     mins  64913;      Neuromuscular Michael:    15    mins  09078;    Therapeutic Activity:     15     mins  28315;         Timed Treatment:   45   mins   Total Treatment:     45   mins    Alyssa Jonas PTA  Physical Therapist Assistant License #[id number]

## 2022-11-21 ENCOUNTER — TREATMENT (OUTPATIENT)
Dept: PHYSICAL THERAPY | Facility: CLINIC | Age: 62
End: 2022-11-21

## 2022-11-21 DIAGNOSIS — M25.619 LIMITED RANGE OF MOTION (ROM) OF SHOULDER: ICD-10-CM

## 2022-11-21 DIAGNOSIS — Z96.612 STATUS POST REVERSE TOTAL REPLACEMENT OF LEFT SHOULDER: ICD-10-CM

## 2022-11-21 DIAGNOSIS — Z96.611 S/P REVERSE TOTAL SHOULDER ARTHROPLASTY, RIGHT: Primary | ICD-10-CM

## 2022-11-21 DIAGNOSIS — M25.511 ACUTE PAIN OF RIGHT SHOULDER: ICD-10-CM

## 2022-11-21 PROCEDURE — 97530 THERAPEUTIC ACTIVITIES: CPT | Performed by: PHYSICAL THERAPIST

## 2022-11-21 PROCEDURE — 97110 THERAPEUTIC EXERCISES: CPT | Performed by: PHYSICAL THERAPIST

## 2022-11-21 PROCEDURE — 97140 MANUAL THERAPY 1/> REGIONS: CPT | Performed by: PHYSICAL THERAPIST

## 2022-11-21 NOTE — PROGRESS NOTES
Physical Therapy Daily Treatment Note      Patient: Robin Wheatley   : 1960  Diagnosis/ICD-10 Code:  S/P reverse total shoulder arthroplasty, right [Z96.611]  Referring practitioner: Carl Hairston, *  Date of Initial Visit: Type: THERAPY  Noted: 2022  Today's Date: 2022  Patient seen for 20 sessions         Robin Wheatley reports: he continues to feel more confident with the arm slowly Pt. states there is still pain and very occasionally and unstable feeling into ER and some strange positions but he states its feeling better in that regard a little at a time.    Objective   See Exercise, Manual, and Modality Logs for complete treatment.     Assessment/Plan  Pt. has Full PROM at this time however there is burning pain into ER and overpressure stretch is required to achieve 180 deg of abd and flex at this time. Pt. Completes exercises with improving stability and handles guiding weights with greater ease as well. Pt. Still continues to have difficulty with ER at 90/90 but greater movements actively is noted.    Goals   Plan Goals: STG to be met in 3 wk:  - Increase R shoulder flex PROM to 130 deg. - MET  - Pt to demonstrated improved posture with min verbal cues. - MET 10/13/2022  - PT to initiate AAROM next visit. - MET  LTG to be met in 12 wk:  - Improve Quick DASH to 30% or less impairment. Not Met Current 64%  - Increase R shoulder flex AROM to 140 deg in order to reach into overhead cabinets and retrieve a plate. MET  - Increase R shoulder strength to 4/5 or greater in all directions for full return to work where he needs to be able to exert 50# of force frequently. Not Met  - Pt to be independent with HEP. Progressing    Progress strengthening /stabilization /functional activity           Timed:         Manual Therapy:    10     mins  98617;     Therapeutic Exercise:    20     mins  57152;     Therapeutic Activity:     15     mins  88078;         Timed Treatment:   45   mins    Total Treatment:     45   mins    Alyssa Jonas PTA  Physical Therapist Assistant License #[id number]

## 2022-12-01 ENCOUNTER — TREATMENT (OUTPATIENT)
Dept: PHYSICAL THERAPY | Facility: CLINIC | Age: 62
End: 2022-12-01

## 2022-12-01 DIAGNOSIS — M25.619 LIMITED RANGE OF MOTION (ROM) OF SHOULDER: ICD-10-CM

## 2022-12-01 DIAGNOSIS — Z96.611 S/P REVERSE TOTAL SHOULDER ARTHROPLASTY, RIGHT: Primary | ICD-10-CM

## 2022-12-01 DIAGNOSIS — M25.511 ACUTE PAIN OF RIGHT SHOULDER: ICD-10-CM

## 2022-12-01 PROCEDURE — 97530 THERAPEUTIC ACTIVITIES: CPT | Performed by: PHYSICAL THERAPIST

## 2022-12-01 PROCEDURE — 97110 THERAPEUTIC EXERCISES: CPT | Performed by: PHYSICAL THERAPIST

## 2022-12-01 PROCEDURE — 97140 MANUAL THERAPY 1/> REGIONS: CPT | Performed by: PHYSICAL THERAPIST

## 2022-12-01 NOTE — PROGRESS NOTES
Physical Therapy Progress Note/Reassessment  [address] [place] [address] [address]   [place] [address]    Patient: Robin Wheatley   : 1960  Diagnosis/ICD-10 Code:  S/P reverse total shoulder arthroplasty, right [Z96.611]  Referring practitioner: Carl Hairston, *  Date of Initial Evaluation:  Type: THERAPY  Noted: 2022  Patient seen for 21 sessions      Subjective:   Visit Diagnoses:    ICD-10-CM ICD-9-CM   1. S/P reverse total shoulder arthroplasty, right  Z96.611 V43.61   2. Acute pain of right shoulder  M25.511 719.41   3. Limited range of motion (ROM) of shoulder  M25.619 719.51         Robin Wheatley reports: he has had a second major event where it felt like the shoulder slipped out while he was moving an ~20 lb bag of cat food into a trunk. Pt. states he was able to hold his wrist and move it around to get it to go back.     Subjective Questionnaire: QuickDASH: 67%  Clinical Progress: improved  Home Program Compliance: Yes  Treatment has included: therapeutic exercise, neuromuscular re-education, manual therapy and therapeutic activity    Objective   See Exercise, Manual, and Modality Logs for complete treatment.   R Shoulder AROM measured supine Flex 0-170 Abd 0-170 ER 0-60 IR 0-70  R Shoulder MMT Flex 5/5 Abd 5/5 ER 3+/5 IR 4/5    Assessment/Plan   Pt. Was encouraged again to focus on stability strengthening and to avoid rare movements such as the awkward ones that have caused the dislocation and provide greater attention to how he lifts and moves. Pt. Was also encouraged to reduce strain into stretches particularly those behind his back and ER to avoid further dislocations. Pt. Will benefit from continued PT at this time for return to work strengthening, Pt.'s job requires 50-75 lbs of force to be exerted frequently and 100 lbs less frequently. Pt. needs continued progressions with skilled PT to avoid further set backs and progress appropriately.     Goals  Plan Goals: STG to be met in 3  wk:  - Increase R shoulder flex PROM to 130 deg. - MET  - Pt to demonstrated improved posture with min verbal cues. - MET 10/13/2022  - PT to initiate AAROM next visit. - MET  LTG to be met in 12 wk:  - Improve Quick DASH to 30% or less impairment. Not Met Current 64%  - Increase R shoulder flex AROM to 140 deg in order to reach into overhead cabinets and retrieve a plate. MET  - Increase R shoulder strength to 4/5 or greater in all directions for full return to work where he needs to be able to exert 50# of force frequently. Not Met  - Pt to be independent with HEP. Progressing     Recommendations: Continue as planned  Timeframe: 3 months  Prognosis to achieve goals: good      Timed:         Manual Therapy:    10     mins  99817;     Therapeutic Exercise:    20     mins  35515;     Therapeutic Activity:     15     mins  13359;         Timed Treatment:   45   mins   Total Treatment:     45   mins    PT Signature: Alyssa Jonas PTA  [place] License: #[id number]

## 2022-12-08 ENCOUNTER — TREATMENT (OUTPATIENT)
Dept: PHYSICAL THERAPY | Facility: CLINIC | Age: 62
End: 2022-12-08

## 2022-12-08 DIAGNOSIS — Z96.611 S/P REVERSE TOTAL SHOULDER ARTHROPLASTY, RIGHT: Primary | ICD-10-CM

## 2022-12-08 DIAGNOSIS — M25.619 LIMITED RANGE OF MOTION (ROM) OF SHOULDER: ICD-10-CM

## 2022-12-08 DIAGNOSIS — M25.511 ACUTE PAIN OF RIGHT SHOULDER: ICD-10-CM

## 2022-12-08 PROCEDURE — 97110 THERAPEUTIC EXERCISES: CPT | Performed by: PHYSICAL THERAPIST

## 2022-12-08 PROCEDURE — 97530 THERAPEUTIC ACTIVITIES: CPT | Performed by: PHYSICAL THERAPIST

## 2022-12-08 PROCEDURE — 97140 MANUAL THERAPY 1/> REGIONS: CPT | Performed by: PHYSICAL THERAPIST

## 2022-12-08 NOTE — PROGRESS NOTES
Physical Therapy Daily Treatment Note      Patient: Robin Wheatley   : 1960  Diagnosis/ICD-10 Code:  S/P reverse total shoulder arthroplasty, right [Z96.611]  Referring practitioner: Carl Hairston, *  Date of Initial Visit: Type: THERAPY  Noted: 2022  Today's Date: 2022  Patient seen for 22 sessions         Robin Wheatley reports: he has been more cautious over the last week and has been feeling better and has not had any new incidence to report. Pt. States he has been avoiding ER and extension motions as discussed to avoid dislocation.    Objective   See Exercise, Manual, and Modality Logs for complete treatment.     Assessment/Plan   Pt. Continues to benefit form strengthening and is showing progressive scapular base and active shoulder stability. Pt. has retained PROM at this time and tolerates stretch with overpressure well.    Goals  Plan Goals: STG to be met in 3 wk:  - Increase R shoulder flex PROM to 130 deg. - MET  - Pt to demonstrated improved posture with min verbal cues. - MET 10/13/2022  - PT to initiate AAROM next visit. - MET  LTG to be met in 12 wk:  - Improve Quick DASH to 30% or less impairment. Not Met Current 64%  - Increase R shoulder flex AROM to 140 deg in order to reach into overhead cabinets and retrieve a plate. MET  - Increase R shoulder strength to 4/5 or greater in all directions for full return to work where he needs to be able to exert 50# of force frequently. Not Met  - Pt to be independent with HEP. Progressing    Progress strengthening /stabilization /functional activity           Timed:         Manual Therapy:    10     mins  99746;     Therapeutic Exercise:    20     mins  46857;     Therapeutic Activity:     15     mins  99274;       Timed Treatment:   45   mins   Total Treatment:     45   mins    Alyssa Jonas PTA  Physical Therapist Assistant License #[id number]

## 2022-12-15 ENCOUNTER — TREATMENT (OUTPATIENT)
Dept: PHYSICAL THERAPY | Facility: CLINIC | Age: 62
End: 2022-12-15

## 2022-12-15 DIAGNOSIS — M25.619 LIMITED RANGE OF MOTION (ROM) OF SHOULDER: ICD-10-CM

## 2022-12-15 DIAGNOSIS — Z96.611 S/P REVERSE TOTAL SHOULDER ARTHROPLASTY, RIGHT: Primary | ICD-10-CM

## 2022-12-15 DIAGNOSIS — M25.511 ACUTE PAIN OF RIGHT SHOULDER: ICD-10-CM

## 2022-12-15 PROCEDURE — 97530 THERAPEUTIC ACTIVITIES: CPT | Performed by: PHYSICAL THERAPIST

## 2022-12-15 PROCEDURE — 97110 THERAPEUTIC EXERCISES: CPT | Performed by: PHYSICAL THERAPIST

## 2022-12-15 NOTE — PROGRESS NOTES
Physical Therapy Progress Note/Reassessment  [address] [place] [address] , [address], [place] [address]    Patient: Robin Wheatley   : 1960  Diagnosis/ICD-10 Code:  S/P reverse total shoulder arthroplasty, right [Z96.611]  Referring practitioner: Carl Hairston, *  Date of Initial Evaluation:  Type: THERAPY  Noted: 2022  Patient seen for 23 sessions      Subjective:   Visit Diagnoses:    ICD-10-CM ICD-9-CM   1. S/P reverse total shoulder arthroplasty, right  Z96.611 V43.61   2. Acute pain of right shoulder  M25.511 719.41   3. Limited range of motion (ROM) of shoulder  M25.619 719.51         Robin Wheatley reports his R shoulder dislocated again a couple days ago when he was sitting doing his arm exercises, pulled his elbow back to lower arm down to side. States he was able to get it back in. Still having some soreness. Rates pain at 3/10. States this was the first time it had dislocated in a while.  Subjective Questionnaire: QuickDASH: not completed  Clinical Progress: improved  Home Program Compliance: Yes  Treatment has included: therapeutic exercise, neuromuscular re-education, manual therapy, therapeutic activity, moist heat and cryotherapy      Objective          Active Range of Motion   Left Shoulder   Flexion: 130 degrees   Abduction: 121 degrees   External rotation BTH: C5     Strength/Myotome Testing     Left Shoulder     Planes of Motion   Flexion: 4   Abduction: 4   External rotation at 0°: 4-   Internal rotation at 0°: 4-       Decreased wt for resisted IR and ER due to compensation. Instructed pt on proper technique for ER and he demonstrated good understanding.  Cues provided throughout exercise for improved scap awareness and for technique. Educated pt on importance of maintaining good form during ther ex and if he breaks form it can lead to injury or increased pain. Instructed pt that when he is doing HEP and is unable to keep good form, then to stop exercise. PT verbalized good understanding  and states he will start doing this.   See Exercise, Manual, and Modality Logs for complete treatment.     Assessment/Plan : Increased pain at start of session. Pt with good understanding of education completed this date. Pt hopeful to return to work next month. Pt will benefit from continued PT to progress scapular strengthening for improved stability of L shoulder and to progress L shoulder strengthening for safe return to work.    Goals  Plan Goals: STG to be met in 3 wk:  - Increase R shoulder flex PROM to 130 deg. - MET  - Pt to demonstrated improved posture with min verbal cues. - MET 10/13/2022  - PT to initiate AAROM next visit. - MET  LTG to be met in 12 wk:  - Improve Quick DASH to 30% or less impairment. Not Met Current 64%  - Increase R shoulder flex AROM to 140 deg in order to reach into overhead cabinets and retrieve a plate. MET  - Increase R shoulder strength to 4/5 or greater in all directions for full return to work where he needs to be able to exert 50# of force frequently. Not Met  - Pt to be independent with HEP. Progressing     Recommendations: Continue as planned  Timeframe: 1 month  Prognosis to achieve goals: good      Timed:         Manual Therapy:         mins  05559;     Therapeutic Exercise:    25     mins  46009;     Neuromuscular Michael:        mins  62293;    Therapeutic Activity:     15     mins  25556;     Gait Training:           mins  12003;     Ultrasound:          mins  55419;    Ionto                                   mins   84217  Self Care                            mins   63078      Un-Timed:  Electrical Stimulation:         mins  25472 ( );  Dry Needling          mins self-pay  Traction          mins 49772  Canalith Repos         mins 07991      Timed Treatment:   40   mins   Total Treatment:     40   mins    PT Signature: Yeny Kapadia PT, CLT  PT License: [place] Lic # [id number]

## 2022-12-19 NOTE — PROGRESS NOTES
Re-Assessment / Re-Certification  [address] [facility] , [address], [place] [address]        Patient: Robin Wheatley   : 1960  Diagnosis/ICD-10 Code:  S/P reverse total shoulder arthroplasty, right [Z96.611]  Referring practitioner: Carl Hairston, *  Date of Initial Visit: Type: THERAPY  Noted: 2022  Today's Date: 2022  Patient seen for 23 sessions      Subjective:   Robin Wheatley reports: his R shoulder dislocated again a couple days ago when he was sitting doing his arm exercises, pulled his elbow back to lower arm down to side. States he was able to get it back in. Still having some soreness. Rates pain at 3/10. States this was the first time it had dislocated in a while.  Subjective Questionnaire: QuickDASH: not completed in error  Clinical Progress: improved  Home Program Compliance: Yes  Treatment has included: therapeutic exercise, neuromuscular re-education, manual therapy, therapeutic activity, moist heat and cryotherapy        Objective            Active Range of Motion   Left Shoulder   Flexion: 130 degrees   Abduction: 121 degrees   External rotation BTH: C5      Strength/Myotome Testing     Left Shoulder      Planes of Motion   Flexion: 4   Abduction: 4   External rotation at 0°: 4-   Internal rotation at 0°: 4-       Decreased wt for resisted IR and ER due to compensation. Instructed pt on proper technique for ER and he demonstrated good understanding.  Cues provided throughout exercise for improved scap awareness and for technique. Educated pt on importance of maintaining good form during ther ex and if he breaks form it can lead to injury or increased pain. Instructed pt that when he is doing HEP and is unable to keep good form, then to stop exercise. PT verbalized good understanding and states he will start doing this.   See Exercise, Manual, and Modality Logs for complete treatment.      Assessment/Plan : Increased pain at start of session. Pt with good understanding of  education completed this date. Pt hopeful to return to work next month. Pt will benefit from continued PT to progress scapular strengthening for improved stability of L shoulder and to progress L shoulder strengthening for safe return to work.     Progress toward previous goals: Partially Met     Goals  Plan Goals: STG to be met in 3 wk:  - Increase R shoulder flex PROM to 130 deg. - MET  - Pt to demonstrated improved posture with min verbal cues. - MET 10/13/2022  - PT to initiate AAROM next visit. - MET  LTG to be met in 12 wk:  - Improve Quick DASH to 30% or less impairment. Not Met Current 64%  - Increase R shoulder flex AROM to 140 deg in order to reach into overhead cabinets and retrieve a plate. MET  - Increase R shoulder strength to 4/5 or greater in all directions for full return to work where he needs to be able to exert 50# of force frequently. Not Met  - Pt to be independent with HEP. Progressing      New Goals  Short-term goals (STG): Pt to demonstrate good form during ther ex with min verbal cues.  Long-term goals (LTG): Will continue to work toward established goals.      Recommendations: Continue as planned  Timeframe: 3 months  Frequency: 1-2x/wk  Prognosis to achieve goals: good    PT Signature: Yeny Kapadia PT, CLT  Physical Therapist  [place] Lic # [id number]  Electronically signed by Yeny Kapadia PT, 12/19/22, 3:46 PM EST    Certification Period: 12/16/2022 thru 3/16/2022  I certify that the therapy services are furnished while this patient is under my care. The services outlined above are required by this patient and will be reviewed every 90 days.    PHYSICIAN: Carl Hairston MD _____________________________________________________________  NPI: [id number]                                      DATE:    ___________________________________________      Timed:         Manual Therapy:         mins  61780;     Therapeutic Exercise:         mins  58984;     Neuromuscular Michael:         mins  85771;    Therapeutic Activity:          mins  56753;     Gait Training:           mins  99610;     Ultrasound:          mins  66292;    Ionto                                   mins   23938  Self Care                            mins   25963    Un-Timed:  Electrical Stimulation:         mins  22667 ( );  Dry Needling          mins 78499/92134  Traction          mins 78855  Can Repos          mins 91329  Low Eval          Mins  75621  Mod Eval          Mins  79410  High Eval                            Mins  55059  Re-Eval                               mins  76375      Timed Treatment:      mins   Total Treatment:       mins

## 2022-12-22 ENCOUNTER — TREATMENT (OUTPATIENT)
Dept: PHYSICAL THERAPY | Facility: CLINIC | Age: 62
End: 2022-12-22

## 2022-12-22 DIAGNOSIS — M25.619 LIMITED RANGE OF MOTION (ROM) OF SHOULDER: ICD-10-CM

## 2022-12-22 DIAGNOSIS — M25.511 ACUTE PAIN OF RIGHT SHOULDER: ICD-10-CM

## 2022-12-22 DIAGNOSIS — Z96.611 S/P REVERSE TOTAL SHOULDER ARTHROPLASTY, RIGHT: Primary | ICD-10-CM

## 2022-12-22 PROCEDURE — 97110 THERAPEUTIC EXERCISES: CPT | Performed by: PHYSICAL THERAPIST

## 2022-12-22 PROCEDURE — 97140 MANUAL THERAPY 1/> REGIONS: CPT | Performed by: PHYSICAL THERAPIST

## 2022-12-22 PROCEDURE — 97530 THERAPEUTIC ACTIVITIES: CPT | Performed by: PHYSICAL THERAPIST

## 2022-12-22 NOTE — PROGRESS NOTES
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 He has noticed this since limiting behind the back motions and being more careful with all activity when reaching away from the body. Pt. States he has increased work activity and intensity but     Objective   See Exercise, Manual, and Modality Logs for complete treatment.     Assessment/Plan  Pt. Tolerates treatment much better at this time and shows much more attention to repetitions and performing them in appropriate range and with good form. Pt. Has no pain throughout session this date and mobility continues to increase WFL.    Goals  Plan Goals: STG to be met in 3 wk:  - Increase R shoulder flex PROM to 130 deg. - MET  - Pt to demonstrated improved posture with min verbal cues. - MET 10/13/2022  - PT to initiate AAROM next visit. - MET  LTG to be met in 12 wk:  - Improve Quick DASH to 30% or less impairment. Not Met Current 64%  - Increase R shoulder flex AROM to 140 deg in order to reach into overhead cabinets and retrieve a plate. MET  - Increase R shoulder strength to 4/5 or greater in all directions for full return to work where he needs to be able to exert 50# of force frequently. Not Met  - Pt to be independent with HEP. Progressing     New Goals  Short-term goals (STG): Pt to demonstrate good form during ther ex with min verbal cues.  Long-term goals (LTG): Will continue to work toward established goals.    Progress strengthening /stabilization /functional activity           Timed:         Manual Therapy:    10     mins   01475;     Therapeutic Exercise:    15     mins  56795;      Therapeutic Activity:     15     mins  78050;         Timed Treatment:   40   mins   Total Treatment:     40   mins    Alyssa Jonas PTA  Physical Therapist Assistant License #[id number]

## 2022-12-29 ENCOUNTER — TREATMENT (OUTPATIENT)
Dept: PHYSICAL THERAPY | Facility: CLINIC | Age: 62
End: 2022-12-29

## 2022-12-29 DIAGNOSIS — Z96.611 S/P REVERSE TOTAL SHOULDER ARTHROPLASTY, RIGHT: Primary | ICD-10-CM

## 2022-12-29 DIAGNOSIS — M25.619 LIMITED RANGE OF MOTION (ROM) OF SHOULDER: ICD-10-CM

## 2022-12-29 DIAGNOSIS — M25.511 ACUTE PAIN OF RIGHT SHOULDER: ICD-10-CM

## 2022-12-29 PROCEDURE — 97140 MANUAL THERAPY 1/> REGIONS: CPT | Performed by: PHYSICAL THERAPIST

## 2022-12-29 PROCEDURE — 97110 THERAPEUTIC EXERCISES: CPT | Performed by: PHYSICAL THERAPIST

## 2022-12-29 NOTE — PROGRESS NOTES
Physical Therapy Daily Treatment Note      Patient: Robin Wheatley   : 1960  Diagnosis/ICD-10 Code:  S/P reverse total shoulder arthroplasty, right [Z96.611]   Problems Addressed this Visit    None  Visit Diagnoses     S/P reverse total shoulder arthroplasty, right    -  Primary    Acute pain of right shoulder        Limited range of motion (ROM) of shoulder          Diagnoses       Codes Comments    S/P reverse total shoulder arthroplasty, right    -  Primary ICD-10-CM: Z96.611  ICD-9-CM: V43.61     Acute pain of right shoulder     ICD-10-CM: M25.511  ICD-9-CM: 719.41     Limited range of motion (ROM) of shoulder     ICD-10-CM: M25.619  ICD-9-CM: 719.51         Referring practitioner: Carl Hairston, *  Date of Initial Visit: Type: THERAPY  Noted: 2022  Today's Date: 2022    VISIT#: 25    Subjective : pt reports he hurt his back carrying buckets of water last week and has been to the chiropractor. States he was carrying full 5-gall buckets, one in each hand. States R shoulder felt fine and made sure he was paying attention to his posture while carrying them. Sees ortho on 2023 and hopes to get released to go back to work. Has been pulling tin and driving posts. States he us using arm at 75% with no pain most of the time and at other times 100%.    Objective : min verbal cues provided for good technique during ther ex. Pt     See Exercise, Manual, and Modality Logs for complete treatment.     Assessment/Plan : Good tolerance to ther ex with no increase in symptoms. 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 Pt is eager and hopeful to return to work next month. Pt will benefit from continued PT to progress scapular strengthening for improved stability of L shoulder and to progress L shoulder strengthening for safe return to work.     Goals  Plan Goals: STG to be met in 3 wk:  - Increase R shoulder flex PROM to 130 deg. - MET  - Pt to demonstrated improved posture with min  verbal cues. - MET 10/13/2022  - PT to initiate AAROM next visit. - MET  LTG to be met in 12 wk:  - Improve Quick DASH to 30% or less impairment. Not Met Current 64%  - Increase R shoulder flex AROM to 140 deg in order to reach into overhead cabinets and retrieve a plate. MET  - Increase R shoulder strength to 4/5 or greater in all directions for full return to work where he needs to be able to exert 50# of force frequently. Not Met  - Pt to be independent with HEP. Progressing    Progress per Plan of Care and Progress strengthening /stabilization /functional activity      Timed:         Manual Therapy:    8     mins  81711;     Therapeutic Exercise:    16     mins  64818;     Neuromuscular Michael:        mins  38927;    Therapeutic Activity:          mins  53569;     Gait Training:           mins  74359;     Ultrasound:          mins  54856;    Ionto                                   mins   24013  Self Care                            mins   02658    Un-Timed:  Electrical Stimulation:         mins  25651 ( );  Dry Needling          mins 19344/14040  Traction          mins 17943  Canalith Repos                   mins  22180  Low Eval          Mins  64150  Mod Eval          Mins  17644  High Eval                            Mins  66474  Re-Eval                               mins  19402    Timed Treatment:   24   mins   Total Treatment:     24   mins    Yeny Kapadia, PT, CLT, Cert DN  Physical Therapist  [place] Lic # [id number]

## 2023-01-05 ENCOUNTER — TREATMENT (OUTPATIENT)
Dept: PHYSICAL THERAPY | Facility: CLINIC | Age: 63
End: 2023-01-05
Payer: COMMERCIAL

## 2023-01-05 DIAGNOSIS — M25.619 LIMITED RANGE OF MOTION (ROM) OF SHOULDER: ICD-10-CM

## 2023-01-05 DIAGNOSIS — M25.512 ACUTE PAIN OF LEFT SHOULDER: ICD-10-CM

## 2023-01-05 DIAGNOSIS — M25.511 ACUTE PAIN OF RIGHT SHOULDER: ICD-10-CM

## 2023-01-05 DIAGNOSIS — Z96.611 S/P REVERSE TOTAL SHOULDER ARTHROPLASTY, RIGHT: Primary | ICD-10-CM

## 2023-01-05 DIAGNOSIS — M19.012 ARTHRITIS OF LEFT SHOULDER REGION: ICD-10-CM

## 2023-01-05 DIAGNOSIS — Z96.612 STATUS POST REVERSE TOTAL REPLACEMENT OF LEFT SHOULDER: ICD-10-CM

## 2023-01-05 PROCEDURE — 97112 NEUROMUSCULAR REEDUCATION: CPT | Performed by: PHYSICAL THERAPIST

## 2023-01-05 PROCEDURE — 97530 THERAPEUTIC ACTIVITIES: CPT | Performed by: PHYSICAL THERAPIST

## 2023-01-05 PROCEDURE — 97110 THERAPEUTIC EXERCISES: CPT | Performed by: PHYSICAL THERAPIST

## 2023-01-05 NOTE — PROGRESS NOTES
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 States he has follow up with MD on  and is unsure if he will be released to work due to strain he knows he will be under.    Objective   See Exercise, Manual, and Modality Logs for complete treatment.     Assessment/Plan   Pt. Shows much more stable movement when performing UE extended exercises having minimal sway or tremor in arm or shoulder. Pt. experiences overall less fatigue with activity and is doing much better to adhere to precautions for stretch and movement in behind the back movements. Pt. Has improved since educated on avoiding overly strenuous movements.    Goals  Plan Goals: STG to be met in 3 wk:  - Increase R shoulder flex PROM to 130 deg. - MET  - Pt to demonstrated improved posture with min verbal cues. - MET 10/13/2022  - PT to initiate AAROM next visit. - MET  LTG to be met in 12 wk:  - Improve Quick DASH to 30% or less impairment. Not Met Current 64%  - Increase R shoulder flex AROM to 140 deg in order to reach into overhead cabinets and retrieve a plate. MET  - Increase R shoulder strength to 4/5 or greater in all directions for full return to work where he needs to be able to exert 50# of force frequently. Not Met  - Pt to be independent with HEP. Progressing       Progress strengthening /stabilization /functional activity           Timed:         Therapeutic Exercise:    15     mins  06259;     Neuromuscular Michael:    15    mins   15350;    Therapeutic Activity:     15     mins  03449;       Timed Treatment:   45   mins   Total Treatment:     45   mins    Alyssa Jonas PTA  Physical Therapist Assistant License #[id number]

## 2023-01-12 ENCOUNTER — TREATMENT (OUTPATIENT)
Dept: PHYSICAL THERAPY | Facility: CLINIC | Age: 63
End: 2023-01-12
Payer: COMMERCIAL

## 2023-01-12 DIAGNOSIS — M25.511 ACUTE PAIN OF RIGHT SHOULDER: ICD-10-CM

## 2023-01-12 DIAGNOSIS — Z96.611 S/P REVERSE TOTAL SHOULDER ARTHROPLASTY, RIGHT: Primary | ICD-10-CM

## 2023-01-12 DIAGNOSIS — M25.619 LIMITED RANGE OF MOTION (ROM) OF SHOULDER: ICD-10-CM

## 2023-01-12 PROCEDURE — 97110 THERAPEUTIC EXERCISES: CPT | Performed by: PHYSICAL THERAPIST

## 2023-01-12 PROCEDURE — 97112 NEUROMUSCULAR REEDUCATION: CPT | Performed by: PHYSICAL THERAPIST

## 2023-01-12 PROCEDURE — 97530 THERAPEUTIC ACTIVITIES: CPT | Performed by: PHYSICAL THERAPIST

## 2023-01-12 NOTE — PROGRESS NOTES
Physical Therapy Progress Note/Reassessment  [address] [place] [address] [address]   [place] [address]    Patient: Robin Wheatley   : 1960  Diagnosis/ICD-10 Code:  S/P reverse total shoulder arthroplasty, right [Z96.611]  Referring practitioner: Carl Hairston, *  Date of Initial Evaluation:  Type: THERAPY  Noted: 2022  Patient seen for 27 sessions      Subjective:   Visit Diagnoses:    ICD-10-CM ICD-9-CM   1. S/P reverse total shoulder arthroplasty, right  Z96.611 V43.61   2. Acute pain of right shoulder  M25.511 719.41   3. Limited range of motion (ROM) of shoulder  M25.619 719.51         Robin Wheatley reports he has no new pains and has not dealt with any slipping joint or dislocation as he continues to be cautious of motion. Pt. States his only complaint is the stiffness and tightness but states he has been able to work through that.  Subjective Questionnaire: QuickDASH: 20%  Clinical Progress: improved  Home Program Compliance: Yes  Treatment has included: therapeutic exercise, neuromuscular re-education, manual therapy and therapeutic activity    Objective   Active Range of Motion   Left Shoulder   Flexion: 150 degrees   Abduction: 140 degrees   External rotation BTH: C5     Strength/Myotome Testing     Left Shoulder      Planes of Motion   Flexion: 5   Abduction: 5   External rotation at 0°: 4-   Internal rotation at 0°: 4+     Assessment/Plan  Pt. Strength and stability Is progressing significantly as he is persistent with exercise and needs continued progress with skilled therpay to avoid poor form and to remain with consistent progresion as needed. Pt. Will benefit from strengthening obrien return to work function and completion of goals.    Goals  Plan Goals: STG to be met in 3 wk:  - Increase R shoulder flex PROM to 130 deg. - MET  - Pt to demonstrated improved posture with min verbal cues. - MET 10/13/2022  - PT to initiate AAROM next visit. - MET  LTG to be met in 12 wk:  - Improve Quick  DASH to 30% or less impairment. MET  - Increase R shoulder flex AROM to 140 deg in order to reach into overhead cabinets and retrieve a plate. MET  - Increase R shoulder strength to 4/5 or greater in all directions for full return to work where he needs to be able to exert 50# of force frequently. Not Met  - Pt to be independent with HEP. Progressing     Recommendations: Continue as planned  Timeframe: Pending Pt. Report of MD recommendations at follow up next week.  Prognosis to achieve goals: good      Timed:           Therapeutic Exercise:    15     mins  95815;     Neuromuscular Michael:    15    mins  93635;    Therapeutic Activity:     15     mins  43028;       Timed Treatment:   45   mins   Total Treatment:     45   mins    PT Signature: Alyssa Jonas PTA  [place] License: #[id number]

## 2023-01-19 ENCOUNTER — TELEPHONE (OUTPATIENT)
Dept: ORTHOPEDIC SURGERY | Facility: CLINIC | Age: 63
End: 2023-01-19

## 2023-01-19 ENCOUNTER — OFFICE VISIT (OUTPATIENT)
Dept: ORTHOPEDIC SURGERY | Facility: CLINIC | Age: 63
End: 2023-01-19
Payer: COMMERCIAL

## 2023-01-19 VITALS — BODY MASS INDEX: 34.79 KG/M2 | HEIGHT: 75 IN | OXYGEN SATURATION: 96 % | WEIGHT: 279.8 LBS

## 2023-01-19 DIAGNOSIS — M75.121 COMPLETE TEAR OF RIGHT ROTATOR CUFF, UNSPECIFIED WHETHER TRAUMATIC: Primary | ICD-10-CM

## 2023-01-19 DIAGNOSIS — Z47.89 ORTHOPEDIC AFTERCARE: Primary | ICD-10-CM

## 2023-01-19 DIAGNOSIS — M75.121 COMPLETE TEAR OF RIGHT ROTATOR CUFF, UNSPECIFIED WHETHER TRAUMATIC: ICD-10-CM

## 2023-01-19 PROCEDURE — 99212 OFFICE O/P EST SF 10 MIN: CPT | Performed by: ORTHOPAEDIC SURGERY

## 2023-01-19 RX ORDER — TRAMADOL HYDROCHLORIDE 50 MG/1
50 TABLET ORAL EVERY 6 HOURS PRN
Qty: 28 TABLET | Refills: 0 | Status: SHIPPED | OUTPATIENT
Start: 2023-01-19 | End: 2023-02-10

## 2023-01-19 NOTE — PROGRESS NOTES
"     Patient ID: Robin Wheatley is a 62 y.o. male.  8/24/22 right reverse total shoulder  Pain minimal having sporadic episodes of feeling like the shoulder is partially unstable no patty dislocations    Review of Systems:        Objective:    Ht 190.5 cm (75\")   Wt 127 kg (279 lb 12.8 oz)   BMI 34.97 kg/m²     Physical Examination:     Right shoulder no pain passive elevation 160 abduction 130 external rotation 40 internal rotation S1 with no crepitance or pain    Imaging:   right Shoulder X-Ray  Indication: Postop total shoulder  AP Y and Lateral views  Findings: Reverse total shoulder unchanged position  no bony lesion  Soft tissues normal  not examined joint spaces  Hardware appropriately positioned yes      yes prior studies available for comparison    Assessment:    Right shoulder pain possible subluxation versus instability    Plan:   Discussed my findings on exam or x-ray today he feels it mainly when trying to force it past the above range of motion's especially for sleep on his back I discussed the physiological range of his shoulder arthroplasty and within those parameters he feels like he is quite stable he would like to try and return to work on February 19 without restrictions I wrote for that see me in 6 months with x-rays sooner as needed      Procedures          Disclaimer: Part of this note may be an electronic transcription/translation of spoken language to printed text using the Dragon Dictation System  "

## 2023-02-10 ENCOUNTER — OFFICE VISIT (OUTPATIENT)
Dept: FAMILY MEDICINE CLINIC | Facility: CLINIC | Age: 63
End: 2023-02-10
Payer: COMMERCIAL

## 2023-02-10 VITALS
HEART RATE: 60 BPM | WEIGHT: 280 LBS | DIASTOLIC BLOOD PRESSURE: 83 MMHG | RESPIRATION RATE: 16 BRPM | TEMPERATURE: 98.4 F | OXYGEN SATURATION: 97 % | SYSTOLIC BLOOD PRESSURE: 134 MMHG | HEIGHT: 75 IN | BODY MASS INDEX: 34.82 KG/M2

## 2023-02-10 DIAGNOSIS — G47.30 SLEEP APNEA, UNSPECIFIED TYPE: Chronic | ICD-10-CM

## 2023-02-10 DIAGNOSIS — J30.9 ALLERGIC RHINITIS, UNSPECIFIED SEASONALITY, UNSPECIFIED TRIGGER: Chronic | ICD-10-CM

## 2023-02-10 DIAGNOSIS — I10 BENIGN ESSENTIAL HYPERTENSION: Primary | Chronic | ICD-10-CM

## 2023-02-10 DIAGNOSIS — M15.9 PRIMARY OSTEOARTHRITIS INVOLVING MULTIPLE JOINTS: ICD-10-CM

## 2023-02-10 DIAGNOSIS — R42 VERTIGO: Chronic | ICD-10-CM

## 2023-02-10 DIAGNOSIS — E03.9 ACQUIRED HYPOTHYROIDISM: Chronic | ICD-10-CM

## 2023-02-10 PROCEDURE — 99214 OFFICE O/P EST MOD 30 MIN: CPT | Performed by: FAMILY MEDICINE

## 2023-02-10 RX ORDER — TADALAFIL 5 MG/1
5 TABLET ORAL DAILY
Qty: 90 TABLET | Refills: 1 | Status: SHIPPED | OUTPATIENT
Start: 2023-02-10 | End: 2023-02-23 | Stop reason: SDUPTHER

## 2023-02-10 NOTE — PROGRESS NOTES
Subjective   Robin Wheatley is a 62 y.o. male.     Chief Complaint   Patient presents with   • Hypertension     6 mth f/u   • Neck Injury     Spot on neck       HPI chief complaint: Hypertension allergic rhinitis vertigo hypothyroidism osteoarthritis sleep apnea    The patient is a 62-year-old white male who comes in for follow-up and maintenance of his current problems which include    1.  Hypertension-stable-patient is on Benicar 10 mg daily.  Blood pressure stable.    2.  Vertigo-stable-the patient currently takes Trental 400 mg twice a day as needed and Lipoflavonoid as needed.  Patient's had problems with dizziness and vertigo off and on for several years.    3.  Allergic rhinitis-stable- the patient is on Flonase 2 sprays each nostril once a day and Zyrtec 10 mg at night as needed.    4.  Osteoarthritis-stable-the patient is current on Mobic 15 mg daily as needed hyaluronic acid daily Tylenol 1000 mg as needed oxycodone 5/3/2025 every 6 hours as needed tramadol 50 mg every 6 hours as needed.  Patient states that he does not use the oxycodone or tramadol much at all.  Patient also is on Voltaren topical gel as needed.  He has had shoulder replacements of both shoulders.    5.  Hypothyroidism-stable-the patient is currently on [place] Thyroid 60 mg daily.  TSH is in the therapeutic range.    6.  Deafness-stable-patient has had deafness for several years.    7.  Thrombocytopenia-stable-patient has history of chronic thrombocytopenia.  Platelet counts run around 120,000.  The cause of the patient's thrombocytopenia is not been determined.  He has had evaluation for this.    Patient's other medications include DHEA 25 mg once a day, Cialis 5 mg once a day, Lipoflavonoid tablets as needed for vertigo, multivitamins, DHEA fatty acids, and vitamin D 5000 international units a day.        The following portions of the patient's history were reviewed and updated as appropriate: allergies, current medications, past  "family history, past medical history, past social history, past surgical history and problem list.    Review of Systems    Objective     /83 (BP Location: Left arm, Patient Position: Sitting, Cuff Size: Large Adult)   Pulse 60   Temp 98.4 °F (36.9 °C) (Oral)   Resp 16   Ht 190.5 cm (75\")   Wt 127 kg (280 lb)   SpO2 97%   BMI 35.00 kg/m²     Physical Exam  Vitals and nursing note reviewed.   Constitutional:       Appearance: He is well-developed.   HENT:      Head: Normocephalic and atraumatic.   Eyes:      Pupils: Pupils are equal, round, and reactive to light.   Cardiovascular:      Rate and Rhythm: Normal rate and regular rhythm.      Pulses: Normal pulses.      Heart sounds: Normal heart sounds. No murmur heard.    No friction rub. No gallop.   Pulmonary:      Effort: Pulmonary effort is normal.      Breath sounds: Normal breath sounds.   Abdominal:      General: Bowel sounds are normal.      Palpations: Abdomen is soft.   Musculoskeletal:         General: Normal range of motion.      Cervical back: Neck supple.   Skin:     General: Skin is warm and dry.   Neurological:      Mental Status: He is alert and oriented to person, place, and time.   Psychiatric:         Behavior: Behavior normal.         Thought Content: Thought content normal.         Judgment: Judgment normal.           Assessment & Plan   Diagnoses and all orders for this visit:    1. Benign essential hypertension (Primary)    2. Allergic rhinitis, unspecified seasonality, unspecified trigger    3. Vertigo    4. Acquired hypothyroidism    5. Primary osteoarthritis involving multiple joints    6. Sleep apnea, unspecified type    Other orders  -     tadalafil (CIALIS) 5 MG tablet; Take 1 tablet by mouth Daily.  Dispense: 90 tablet; Refill: 1      Patient Instructions   Continue your current medications and treatment.    Follow up in the office in 6 months.    Laboratory testing at that time.      Marlo Michaels Jr., MD    02/10/23  "

## 2023-02-17 ENCOUNTER — TELEPHONE (OUTPATIENT)
Dept: FAMILY MEDICINE CLINIC | Facility: CLINIC | Age: 63
End: 2023-02-17

## 2023-02-17 NOTE — TELEPHONE ENCOUNTER
Pharmacy Name: [facility] - [place], [place] - [address] - [phone] [facility] [phone] FX     What medication are you calling in regards to: tadalafil (CIALIS) 5 MG tablet    What question does the pharmacy have: INSURANCE WILL NOT COVER WITHOUT ADDITION AL INFORMATION AS TO WHY HE NEEDS DAILY. OLGA STATES IT IS FOR [facility]

## 2023-02-23 RX ORDER — MELOXICAM 15 MG/1
15 TABLET ORAL DAILY
Qty: 90 TABLET | Refills: 0 | Status: SHIPPED | OUTPATIENT
Start: 2023-02-23 | End: 2023-04-03

## 2023-02-23 RX ORDER — TADALAFIL 5 MG/1
5 TABLET ORAL DAILY
Qty: 90 TABLET | Refills: 1 | Status: SHIPPED | OUTPATIENT
Start: 2023-02-23

## 2023-02-23 NOTE — TELEPHONE ENCOUNTER
Caller: SATINDER MANRIQUE    Relationship: Emergency Contact    Best call back number: [phone]    Requested Prescriptions:   Requested Prescriptions     Pending Prescriptions Disp Refills   • meloxicam (MOBIC) 15 MG tablet 90 tablet 0     Sig: Take 1 tablet by mouth Daily.        Pharmacy where request should be sent: [facility] - [place], [place] - [facility] [address] - [phone] [facility] [phone] FX     Additional details provided by patient: PLEASE SEND PRIORITY ON THIS.  ONLY 6 PILLS LEFT    Does the patient have less than a 3 day supply:  [] Yes  [x] No    Would you like a call back once the refill request has been completed: [] Yes [x] No    If the office needs to give you a call back, can they leave a voicemail: [] Yes [x] No    Juni Payne   02/23/23 10:22 EST         DELETE AFTER READING TO PATIENT: “Thank you for sharing this information with me. I will send a message to the clinical team. Please allow 48 hours for the clinical staff to follow up on this request.”

## 2023-02-23 NOTE — TELEPHONE ENCOUNTER
Caller: SAMANTHA MANRIQUEA    Relationship: Emergency Contact    Best call back number: [phone]    Requested Prescriptions:   Requested Prescriptions     Pending Prescriptions Disp Refills   • tadalafil (CIALIS) 5 MG tablet 90 tablet 1     Sig: Take 1 tablet by mouth Daily.        Pharmacy where request should be sent: [id number] - [place]  [address] [facility] [address] AT [facility] OF  [address] [facility] - [phone] [place] [phone] FX     Additional details provided by patient: OUT    Does the patient have less than a 3 day supply:  [x] Yes  [] No    Would you like a call back once the refill request has been completed: [] Yes [x] No    If the office needs to give you a call back, can they leave a voicemail: [] Yes [x] No    Juni Payne   02/23/23 10:24 EST

## 2023-02-27 ENCOUNTER — DOCUMENTATION (OUTPATIENT)
Dept: FAMILY MEDICINE CLINIC | Facility: CLINIC | Age: 63
End: 2023-02-27
Payer: COMMERCIAL

## 2023-02-27 NOTE — PROGRESS NOTES
Robin Wheatley Key: [id number] - PA Case ID: [id number] - Rx #: [id number]  Outcome  Deniedon February 24  PA Case: [id number], Status: Denied. Notification: Completed.  Drug  Tadalafil 5MG tablets  Form  [employer] Electronic PA Form (2017 NCPDP)  Original Claim Info  75 SUBMIT PA REQUEST TO: [email or web address] PA REQUEST TO:[email or web address] REQUIRES PRIOR AUTHORIZATION

## 2023-04-03 DIAGNOSIS — M75.121 COMPLETE TEAR OF RIGHT ROTATOR CUFF, UNSPECIFIED WHETHER TRAUMATIC: ICD-10-CM

## 2023-04-03 RX ORDER — MELOXICAM 15 MG/1
TABLET ORAL
Qty: 90 TABLET | Refills: 0 | Status: SHIPPED | OUTPATIENT
Start: 2023-04-03

## 2023-04-03 RX ORDER — THYROID 60 MG
TABLET ORAL
Qty: 90 TABLET | Refills: 1 | Status: SHIPPED | OUTPATIENT
Start: 2023-04-03

## 2023-04-03 RX ORDER — OLMESARTAN MEDOXOMIL 20 MG/1
TABLET ORAL
Qty: 45 TABLET | Refills: 1 | Status: SHIPPED | OUTPATIENT
Start: 2023-04-03

## 2023-04-03 RX ORDER — TRAMADOL HYDROCHLORIDE 50 MG/1
TABLET ORAL
Qty: 28 TABLET | Refills: 0 | Status: SHIPPED | OUTPATIENT
Start: 2023-04-03

## 2023-04-04 ENCOUNTER — TELEPHONE (OUTPATIENT)
Dept: ORTHOPEDIC SURGERY | Facility: CLINIC | Age: 63
End: 2023-04-04
Payer: COMMERCIAL

## 2023-04-04 NOTE — TELEPHONE ENCOUNTER
Called and left message for Robin to return Annette's call.  We need the name of another pharmacy.

## 2023-04-05 NOTE — TELEPHONE ENCOUNTER
Wife called back and stated the patient will wait for medication to come in from [facility].  I advised them to contact [facility] to let them know he will wait for medication to come back into stock.

## 2023-07-29 ENCOUNTER — LAB (OUTPATIENT)
Dept: LAB | Facility: HOSPITAL | Age: 63
End: 2023-07-29
Payer: COMMERCIAL

## 2023-07-29 DIAGNOSIS — E03.9 ACQUIRED HYPOTHYROIDISM: Chronic | ICD-10-CM

## 2023-07-29 DIAGNOSIS — I10 BENIGN ESSENTIAL HYPERTENSION: ICD-10-CM

## 2023-07-29 DIAGNOSIS — Z13.9 ENCOUNTER FOR HEALTH-RELATED SCREENING: ICD-10-CM

## 2023-07-29 DIAGNOSIS — E29.1 TESTICULAR HYPOFUNCTION: Chronic | ICD-10-CM

## 2023-07-29 LAB
ALBUMIN SERPL-MCNC: 4.8 G/DL (ref 3.5–5.2)
ALBUMIN/GLOB SERPL: 2.7 G/DL
ALP SERPL-CCNC: 66 U/L (ref 39–117)
ALT SERPL W P-5'-P-CCNC: 9 U/L (ref 1–41)
ANION GAP SERPL CALCULATED.3IONS-SCNC: 9 MMOL/L (ref 5–15)
AST SERPL-CCNC: 21 U/L (ref 1–40)
BASOPHILS # BLD AUTO: 0 10*3/MM3 (ref 0–0.2)
BASOPHILS NFR BLD AUTO: 0.9 % (ref 0–1.5)
BILIRUB SERPL-MCNC: 0.5 MG/DL (ref 0–1.2)
BUN SERPL-MCNC: 22 MG/DL (ref 8–23)
BUN/CREAT SERPL: 21.2 (ref 7–25)
CALCIUM SPEC-SCNC: 10.2 MG/DL (ref 8.6–10.5)
CHLORIDE SERPL-SCNC: 104 MMOL/L (ref 98–107)
CHOLEST SERPL-MCNC: 138 MG/DL (ref 0–200)
CO2 SERPL-SCNC: 28 MMOL/L (ref 22–29)
CREAT SERPL-MCNC: 1.04 MG/DL (ref 0.76–1.27)
DEPRECATED RDW RBC AUTO: 49.4 FL (ref 37–54)
EGFRCR SERPLBLD CKD-EPI 2021: 80.7 ML/MIN/1.73
EOSINOPHIL # BLD AUTO: 0.2 10*3/MM3 (ref 0–0.4)
EOSINOPHIL NFR BLD AUTO: 2.8 % (ref 0.3–6.2)
ERYTHROCYTE [DISTWIDTH] IN BLOOD BY AUTOMATED COUNT: 14.5 % (ref 12.3–15.4)
GLOBULIN UR ELPH-MCNC: 1.8 GM/DL
GLUCOSE SERPL-MCNC: 86 MG/DL (ref 65–99)
HCT VFR BLD AUTO: 45.2 % (ref 37.5–51)
HDLC SERPL-MCNC: 39 MG/DL (ref 40–60)
HGB BLD-MCNC: 14.7 G/DL (ref 13–17.7)
LDLC SERPL CALC-MCNC: 84 MG/DL (ref 0–100)
LDLC/HDLC SERPL: 2.14 {RATIO}
LYMPHOCYTES # BLD AUTO: 1.2 10*3/MM3 (ref 0.7–3.1)
LYMPHOCYTES NFR BLD AUTO: 22.3 % (ref 19.6–45.3)
MCH RBC QN AUTO: 30.3 PG (ref 26.6–33)
MCHC RBC AUTO-ENTMCNC: 32.5 G/DL (ref 31.5–35.7)
MCV RBC AUTO: 93.2 FL (ref 79–97)
MONOCYTES # BLD AUTO: 0.3 10*3/MM3 (ref 0.1–0.9)
MONOCYTES NFR BLD AUTO: 5.3 % (ref 5–12)
NEUTROPHILS NFR BLD AUTO: 3.8 10*3/MM3 (ref 1.7–7)
NEUTROPHILS NFR BLD AUTO: 68.7 % (ref 42.7–76)
NRBC BLD AUTO-RTO: 0.1 /100 WBC (ref 0–0.2)
PLATELET # BLD AUTO: 115 10*3/MM3 (ref 140–450)
PMV BLD AUTO: 11.4 FL (ref 6–12)
POTASSIUM SERPL-SCNC: 5.9 MMOL/L (ref 3.5–5.2)
PROT SERPL-MCNC: 6.6 G/DL (ref 6–8.5)
PSA SERPL-MCNC: 0.32 NG/ML (ref 0–4)
RBC # BLD AUTO: 4.85 10*6/MM3 (ref 4.14–5.8)
SODIUM SERPL-SCNC: 141 MMOL/L (ref 136–145)
TRIGL SERPL-MCNC: 77 MG/DL (ref 0–150)
TSH SERPL DL<=0.05 MIU/L-ACNC: 2.4 UIU/ML (ref 0.27–4.2)
VLDLC SERPL-MCNC: 15 MG/DL (ref 5–40)
WBC NRBC COR # BLD: 5.5 10*3/MM3 (ref 3.4–10.8)

## 2023-07-29 PROCEDURE — 36415 COLL VENOUS BLD VENIPUNCTURE: CPT

## 2023-07-29 PROCEDURE — 84153 ASSAY OF PSA TOTAL: CPT

## 2023-07-29 PROCEDURE — 80061 LIPID PANEL: CPT

## 2023-07-29 PROCEDURE — 80050 GENERAL HEALTH PANEL: CPT

## 2023-08-24 ENCOUNTER — OFFICE VISIT (OUTPATIENT)
Dept: FAMILY MEDICINE CLINIC | Facility: CLINIC | Age: 63
End: 2023-08-24
Payer: COMMERCIAL

## 2023-08-24 ENCOUNTER — LAB (OUTPATIENT)
Dept: LAB | Facility: HOSPITAL | Age: 63
End: 2023-08-24
Payer: COMMERCIAL

## 2023-08-24 VITALS
HEIGHT: 75 IN | WEIGHT: 223 LBS | DIASTOLIC BLOOD PRESSURE: 72 MMHG | OXYGEN SATURATION: 96 % | SYSTOLIC BLOOD PRESSURE: 131 MMHG | HEART RATE: 53 BPM | BODY MASS INDEX: 27.73 KG/M2 | TEMPERATURE: 98.1 F

## 2023-08-24 DIAGNOSIS — R42 VERTIGO: Chronic | ICD-10-CM

## 2023-08-24 DIAGNOSIS — E87.5 HYPERKALEMIA: ICD-10-CM

## 2023-08-24 DIAGNOSIS — E03.9 ACQUIRED HYPOTHYROIDISM: Chronic | ICD-10-CM

## 2023-08-24 DIAGNOSIS — H91.91 DEAFNESS IN RIGHT EAR: Chronic | ICD-10-CM

## 2023-08-24 DIAGNOSIS — M19.011 PRIMARY OSTEOARTHRITIS OF RIGHT SHOULDER: Chronic | ICD-10-CM

## 2023-08-24 DIAGNOSIS — E29.1 TESTICULAR HYPOFUNCTION: Chronic | ICD-10-CM

## 2023-08-24 DIAGNOSIS — H61.21 HEARING LOSS DUE TO CERUMEN IMPACTION, RIGHT: ICD-10-CM

## 2023-08-24 DIAGNOSIS — E87.5 HYPERKALEMIA: Primary | ICD-10-CM

## 2023-08-24 DIAGNOSIS — G47.33 OBSTRUCTIVE SLEEP APNEA SYNDROME: Chronic | ICD-10-CM

## 2023-08-24 DIAGNOSIS — E66.3 OVERWEIGHT WITH BODY MASS INDEX (BMI) OF 27 TO 27.9 IN ADULT: Chronic | ICD-10-CM

## 2023-08-24 DIAGNOSIS — I10 BENIGN ESSENTIAL HYPERTENSION: Chronic | ICD-10-CM

## 2023-08-24 DIAGNOSIS — J30.9 ALLERGIC RHINITIS, UNSPECIFIED SEASONALITY, UNSPECIFIED TRIGGER: Chronic | ICD-10-CM

## 2023-08-24 LAB
ANION GAP SERPL CALCULATED.3IONS-SCNC: 10 MMOL/L (ref 5–15)
BUN SERPL-MCNC: 25 MG/DL (ref 8–23)
BUN/CREAT SERPL: 25.3 (ref 7–25)
CALCIUM SPEC-SCNC: 10.3 MG/DL (ref 8.6–10.5)
CHLORIDE SERPL-SCNC: 105 MMOL/L (ref 98–107)
CO2 SERPL-SCNC: 28 MMOL/L (ref 22–29)
CREAT SERPL-MCNC: 0.99 MG/DL (ref 0.76–1.27)
EGFRCR SERPLBLD CKD-EPI 2021: 85.1 ML/MIN/1.73
GLUCOSE SERPL-MCNC: 85 MG/DL (ref 65–99)
POTASSIUM SERPL-SCNC: 4.6 MMOL/L (ref 3.5–5.2)
SODIUM SERPL-SCNC: 143 MMOL/L (ref 136–145)

## 2023-08-24 PROCEDURE — 36415 COLL VENOUS BLD VENIPUNCTURE: CPT

## 2023-08-24 PROCEDURE — 99214 OFFICE O/P EST MOD 30 MIN: CPT | Performed by: NURSE PRACTITIONER

## 2023-08-24 PROCEDURE — 80048 BASIC METABOLIC PNL TOTAL CA: CPT

## 2023-08-24 NOTE — PATIENT INSTRUCTIONS
Debrox or get ear flushing kit. No qtips in ear.  Right ear full wax  If you want see surgeon about the cystic nodule let me know will order  Follow up on labs.   Look at your optiva supplement may have too much potassium

## 2023-08-24 NOTE — ASSESSMENT & PLAN NOTE
Patient's (Body mass index is 27.87 kg/mý.) indicates that they are overweight with health conditions that include hypertension . Weight is improving with lifestyle modifications. BMI is is above average; BMI management plan is completed. We discussed portion control and increasing exercise.

## 2023-08-24 NOTE — PROGRESS NOTES
Subjective        Robin Wheatley is a 63 y.o. male.     Chief Complaint   Patient presents with    Hypertension     6 month f/u       Hypertension    Patient is here for management of his chronic medical problems: hypertension    Hypertension  20mg  he cuts in half takes every other day. He checks this every few days.   Is always 120-125/7075.  He has lost weight of recent .     Deafness: has hearing aids not wearing today due to work recent hearing test none in right left little.     TOBY ; has cpap and has improved quality of life    Allergies followed by Dr Bolton allergy serum inj, zyrtec daily .     Shoulder pain not taking for 3-4 months only if needed post surgery.     Vertigo taking lipotropics bid as needed. Pentoxifylline 400mg bid as needed helps symptoms    ED taking cialis as needed. Testicular hypofunction    Vit d def taking vit d     Hypothyroidism taking armour thyroid 60 mg daily.    Optiva diet for 3 months consider may be source of potassium.     The following portions of the patient's history were reviewed and updated as appropriate: allergies, current medications, past family history, past medical history, past social history, past surgical history and problem list.      Current Outpatient Medications:     acetaminophen (TYLENOL) 500 MG tablet, Take 2 tablets by mouth As Needed for Mild Pain., Disp: , Rfl:     ALLERGY SERUM INJECTION, Inject 0.16 mL under the skin into the appropriate area as directed 1 (One) Time Per Week. wednesday, Disp: , Rfl:     [place] Thyroid 60 MG tablet, TAKE 1 TABLET DAILY, Disp: 90 tablet, Rfl: 1    cetirizine (zyrTEC) 10 MG tablet, Take 1 tablet by mouth Every Morning., Disp: , Rfl:     DHEA 25 MG capsule, Take 1 capsule by mouth Daily., Disp: , Rfl:     Docosahexaenoic Acid (DHA OMEGA 3 PO), Take 1 tablet by mouth 2 (Two) Times a Day., Disp: , Rfl:     fluticasone (FLONASE) 50 MCG/ACT nasal spray, 2 sprays into the nostril(s) as directed by provider As Needed.,  "Disp: , Rfl:     Hyaluronic Acid-Vitamin C (HYALURONIC ACID PO), Take 1 tablet by mouth Daily., Disp: , Rfl:     meloxicam (MOBIC) 15 MG tablet, TAKE 1 TABLET DAILY, Disp: 90 tablet, Rfl: 0    multivitamin with minerals tablet tablet, Take 1 tablet by mouth Daily., Disp: , Rfl:     olmesartan (BENICAR) 20 MG tablet, TAKE 1/2 TABLET EVERY      MORNING, Disp: 45 tablet, Rfl: 1    pentoxifylline (TRENtal) 400 MG CR tablet, Take 1 tablet by mouth Every 12 (Twelve) Hours., Disp: 180 tablet, Rfl: 0    Syringe/Needle, Disp, 21G X 1\" 3 ML misc, BD ECLIPSE SYRINGE 21G X 1\" 3 ML, Disp: , Rfl:     tadalafil (CIALIS) 5 MG tablet, Take 1 tablet by mouth Daily., Disp: 90 tablet, Rfl: 1    traMADol (ULTRAM) 50 MG tablet, TAKE 1 TABLET EVERY 6 HOURSAS NEEDED FOR MODERATE PAIN (Patient taking differently: PRN), Disp: 28 tablet, Rfl: 0    vitamin D3 125 MCG (5000 UT) capsule capsule, Take 1 capsule by mouth Daily., Disp: , Rfl:     Vitamins-Lipotropics ([facility] Plus) tablet, Take 1 tablet by mouth 2 (Two) Times a Day., Disp: , Rfl:     Recent Results (from the past 4032 hour(s))   Comprehensive Metabolic Panel    Collection Time: 07/29/23 11:31 AM    Specimen: Blood   Result Value Ref Range    Glucose 86 65 - 99 mg/dL    BUN 22 8 - 23 mg/dL    Creatinine 1.04 0.76 - 1.27 mg/dL    Sodium 141 136 - 145 mmol/L    Potassium 5.9 (H) 3.5 - 5.2 mmol/L    Chloride 104 98 - 107 mmol/L    CO2 28.0 22.0 - 29.0 mmol/L    Calcium 10.2 8.6 - 10.5 mg/dL    Total Protein 6.6 6.0 - 8.5 g/dL    Albumin 4.8 3.5 - 5.2 g/dL    ALT (SGPT) 9 1 - 41 U/L    AST (SGOT) 21 1 - 40 U/L    Alkaline Phosphatase 66 39 - 117 U/L    Total Bilirubin 0.5 0.0 - 1.2 mg/dL    Globulin 1.8 gm/dL    A/G Ratio 2.7 g/dL    BUN/Creatinine Ratio 21.2 7.0 - 25.0    Anion Gap 9.0 5.0 - 15.0 mmol/L    eGFR 80.7 >60.0 mL/min/1.73   Lipid Panel    Collection Time: 07/29/23 11:31 AM    Specimen: Blood   Result Value Ref Range    Total Cholesterol 138 0 - 200 mg/dL    " "Triglycerides 77 0 - 150 mg/dL    HDL Cholesterol 39 (L) 40 - 60 mg/dL    LDL Cholesterol  84 0 - 100 mg/dL    VLDL Cholesterol 15 5 - 40 mg/dL    LDL/HDL Ratio 2.14    TSH    Collection Time: 07/29/23 11:31 AM    Specimen: Blood   Result Value Ref Range    TSH 2.400 0.270 - 4.200 uIU/mL   PSA DIAGNOSTIC ONLY    Collection Time: 07/29/23 11:31 AM    Specimen: Blood   Result Value Ref Range    PSA 0.320 0.000 - 4.000 ng/mL   CBC Auto Differential    Collection Time: 07/29/23 11:31 AM    Specimen: Blood   Result Value Ref Range    WBC 5.50 3.40 - 10.80 10*3/mm3    RBC 4.85 4.14 - 5.80 10*6/mm3    Hemoglobin 14.7 13.0 - 17.7 g/dL    Hematocrit 45.2 37.5 - 51.0 %    MCV 93.2 79.0 - 97.0 fL    MCH 30.3 26.6 - 33.0 pg    MCHC 32.5 31.5 - 35.7 g/dL    RDW 14.5 12.3 - 15.4 %    RDW-SD 49.4 37.0 - 54.0 fl    MPV 11.4 6.0 - 12.0 fL    Platelets 115 (L) 140 - 450 10*3/mm3    Neutrophil % 68.7 42.7 - 76.0 %    Lymphocyte % 22.3 19.6 - 45.3 %    Monocyte % 5.3 5.0 - 12.0 %    Eosinophil % 2.8 0.3 - 6.2 %    Basophil % 0.9 0.0 - 1.5 %    Neutrophils, Absolute 3.80 1.70 - 7.00 10*3/mm3    Lymphocytes, Absolute 1.20 0.70 - 3.10 10*3/mm3    Monocytes, Absolute 0.30 0.10 - 0.90 10*3/mm3    Eosinophils, Absolute 0.20 0.00 - 0.40 10*3/mm3    Basophils, Absolute 0.00 0.00 - 0.20 10*3/mm3    nRBC 0.1 0.0 - 0.2 /100 WBC         Review of Systems    Objective     /72 (BP Location: Left arm, Patient Position: Sitting, Cuff Size: Large Adult)   Pulse 53   Temp 98.1 øF (36.7 øC) (Infrared)   Ht 190.5 cm (75\")   Wt 101 kg (223 lb)   SpO2 96%   BMI 27.87 kg/mý     Physical Exam  Vitals and nursing note reviewed.   HENT:      Head: Normocephalic.      Right Ear: Decreased hearing noted. There is impacted cerumen.      Left Ear: Decreased hearing noted.      Nose: Nose normal.      Mouth/Throat:      Mouth: Mucous membranes are moist.   Eyes:      Conjunctiva/sclera: Conjunctivae normal.      Pupils: Pupils are equal, round, and " reactive to light.   Cardiovascular:      Rate and Rhythm: Normal rate and regular rhythm.      Pulses: Normal pulses.      Heart sounds: Normal heart sounds.   Pulmonary:      Effort: Pulmonary effort is normal.      Breath sounds: Normal breath sounds.   Abdominal:      General: Bowel sounds are normal.      Palpations: Abdomen is soft.   Skin:     Capillary Refill: Capillary refill takes less than 2 seconds.             Comments: Very small cystic type nodule/? Lymph node  No redness non painful   Neurological:      General: No focal deficit present.      Mental Status: He is alert and oriented to person, place, and time.   Psychiatric:         Mood and Affect: Mood normal.         Behavior: Behavior normal.         Thought Content: Thought content normal.         Judgment: Judgment normal.       Result Review :                Assessment & Plan    Diagnoses and all orders for this visit:    1. Hyperkalemia (Primary)  Comments:  labs ordered  Orders:  -     Basic Metabolic Panel; Future    2. Benign essential hypertension  Comments:  stable on meds    3. Obstructive sleep apnea syndrome  Comments:  stable on cpap  Assessment & Plan:  Currently on cpap      4. Primary osteoarthritis of right shoulder  Comments:  stable    5. Acquired hypothyroidism  Comments:  stable labs completed    6. Vertigo  Comments:  stable    7. Deafness in right ear  Comments:  discussed this toay    8. Allergic rhinitis, unspecified seasonality, unspecified trigger  Comments:  stable    9. Testicular hypofunction  Comments:  stable PSA    10. Overweight with body mass index (BMI) of 27 to 27.9 in adult  Comments:  he is working on weight loss.  Assessment & Plan:  Patient's (Body mass index is 27.87 kg/mý.) indicates that they are overweight with health conditions that include hypertension . Weight is improving with lifestyle modifications. BMI is is above average; BMI management plan is completed. We discussed portion control and  increasing exercise.         Patient Instructions   Debrox or get ear flushing kit. No qtips in ear.  Right ear full wax  If you want see surgeon about the cystic nodule let me know will order  Follow up on labs.     Follow Up   No follow-ups on file.    Patient was given instructions and counseling regarding his condition or for health maintenance advice. Please see specific information pulled into the AVS if appropriate.     Demetria Carranza, APRN    08/24/23

## 2023-08-29 ENCOUNTER — TELEPHONE (OUTPATIENT)
Dept: FAMILY MEDICINE CLINIC | Facility: CLINIC | Age: 63
End: 2023-08-29

## 2023-08-29 NOTE — TELEPHONE ENCOUNTER
Caller: Robin Wheatley    Relationship: Self    Best call back number: [phone]     What form or medical record are you requesting: LAB WORK FROM 7.29.23 AND 8.24.23      How would you like to receive the form or medical records (pick-up, mail, fax): MAIL  If mail, what is the address: [address] [place] [address] [place] [facility]         Additional notes: PATIENT IS REQUESTING COPIES OF HIS LAB RESULTS FROM BOTH 7.29.23 AND 8.24.23

## 2023-09-07 RX ORDER — TADALAFIL 5 MG/1
5 TABLET ORAL DAILY
Qty: 90 TABLET | Refills: 1 | Status: CANCELLED | OUTPATIENT
Start: 2023-09-07

## 2023-09-07 NOTE — TELEPHONE ENCOUNTER
Incoming Refill Request      Medication requested (name and dose):   tadalafil (CIALIS) 5 MG tablet  5 mg, Daily       Pharmacy where request should be sent: [id number] - [place]  [address] [facility] [address] AT [facility] OF  &  - [phone] PH - [phone]     Additional details provided by patient: REQUESTED 90 DAY REFILL    Best call back number: [phone]    Does the patient have less than a 3 day supply:  [x] Yes  [] No    Eliecer Faria Rep  09/07/23, 14:51 EDT

## 2023-09-07 NOTE — TELEPHONE ENCOUNTER
Incoming Refill Request      Medication requested (name and dose):   [place] Thyroid 60 MG tablet   1 ordered       meloxicam (MOBIC) 15 MG tablet   0 ordered       pentoxifylline (TRENtal) 400 MG CR tablet  400 mg, Every 12 Hours     Pharmacy where request should be sent: [facility] - [place], [place] - [facility] [address] - [phone]  - [phone]  [phone]     Additional details provided by patient: NONE    Best call back number: [phone]    Does the patient have less than a 3 day supply:  [x] Yes  [] No    Eliecer Faria Rep  09/07/23, 14:49 EDT

## 2023-09-09 RX ORDER — THYROID 60 MG
60 TABLET ORAL DAILY
Qty: 90 TABLET | Refills: 1 | Status: SHIPPED | OUTPATIENT
Start: 2023-09-09

## 2023-09-09 RX ORDER — PENTOXIFYLLINE 400 MG/1
400 TABLET, EXTENDED RELEASE ORAL EVERY 12 HOURS
Qty: 180 TABLET | Refills: 1 | Status: SHIPPED | OUTPATIENT
Start: 2023-09-09

## 2023-09-09 RX ORDER — TADALAFIL 5 MG/1
5 TABLET ORAL DAILY
Qty: 90 TABLET | Refills: 1 | Status: SHIPPED | OUTPATIENT
Start: 2023-09-09

## 2023-09-09 RX ORDER — MELOXICAM 15 MG/1
15 TABLET ORAL DAILY
Qty: 90 TABLET | Refills: 1 | Status: SHIPPED | OUTPATIENT
Start: 2023-09-09

## 2023-10-26 NOTE — DISCHARGE INSTRUCTIONS
Patient called regarding needing refill for Pantoprazole.  Please send to [facility] pharmacy on file.  Also, had question about tac.  Said needs new script sent [facility] because it was increased.  Had labs done 2 weeks ago and was wondering what tac level is.  Please advise patient.   Take the following medications the morning of surgery with a small sip of water:  NONE    ARRIVE 11:30 AM 1/14      If you are on prescription narcotic pain medication to control your pain you may also take that medication the morning of surgery.    General Instructions:  • Do not eat or drink anything after midnight the night before surgery.  • Infants may have breast milk up to four hours before surgery.  • Infants drinking formula may drink formula up to six hours before surgery.   • Patients who avoid smoking, chewing tobacco and alcohol for 4 weeks prior to surgery have a reduced risk of post-operative complications.  Quit smoking as many days before surgery as you can.  • Do not smoke, use chewing tobacco or drink alcohol the day of surgery.   • If applicable bring your C-PAP/ BI-PAP machine.  • Bring any papers given to you in the doctor’s office.  • Wear clean comfortable clothes.  • Do not wear contact lenses, false eyelashes or make-up.  Bring a case for your glasses.   • Bring crutches or walker if applicable.  • Remove all piercings.  Leave jewelry and any other valuables at home.  • Hair extensions with metal clips must be removed prior to surgery.  • The Pre-Admission Testing nurse will instruct you to bring medications if unable to obtain an accurate list in Pre-Admission Testing.          Preventing a Surgical Site Infection:  • For 2 to 3 days before surgery, avoid shaving with a razor because the razor can irritate skin and make it easier to develop an infection.    • Any areas of open skin can increase the risk of a post-operative wound infection by allowing bacteria to enter and travel throughout the body.  Notify your surgeon if you have any skin wounds / rashes even if it is not near the expected surgical site.  The area will need assessed to determine if surgery should be delayed until it is healed.  • The night prior to surgery shower using a fresh bar of anti-bacterial soap (such as Dial)  and clean washcloth.  Sleep in a clean bed with clean clothing.  Do not allow pets to sleep with you.  • Shower on the morning of surgery using a fresh bar of anti-bacterial soap (such as Dial) and clean washcloth.  Dry with a clean towel and dress in clean clothing.  • Ask your surgeon if you will be receiving antibiotics prior to surgery.  • Make sure you, your family, and all healthcare providers clean their hands with soap and water or an alcohol based hand  before caring for you or your wound.    Day of surgery:  Your arrival time is approximately two hours before your scheduled surgery time.  Upon arrival, a Pre-op nurse and Anesthesiologist will review your health history, obtain vital signs, and answer questions you may have.  The only belongings needed at this time will be your home medications and if applicable your C-PAP/BI-PAP machine.  A Pre-op nurse will start an IV and you may receive medication in preparation for surgery, including something to help you relax.      Please be aware that surgery does come with discomfort.  We want to make every effort to control your discomfort so please discuss any uncontrolled symptoms with your nurse.   Your doctor will most likely have prescribed pain medications.      If you are going home after surgery you will receive individualized written care instructions before being discharged.  A responsible adult must drive you to and from the hospital on the day of your surgery and stay with you for 24 hours.  Discharge prescriptions can be filled by the hospital pharmacy during regular pharmacy hours.  If you are having surgery late in the day/evening your prescription may be e-prescribed to your pharmacy.  Please verify your pharmacy hours or chose a 24 hour pharmacy to avoid not having access to your prescription because your pharmacy has closed for the day.    If you are staying overnight following surgery, you will be transported to your hospital room  following the recovery period.  [facility] has all private rooms.    If you have any questions please call Pre-Admission Testing at [phone].  Deductibles and co-payments are collected on the day of service. Please be prepared to pay the required co-pay, deductible or deposit on the day of service as defined by your plan.    Patient Education for Self-Quarantine Process    • Following your COVID testing, we strongly recommend that you wear a mask when you are with other people and practice social distancing.   • Limit your activities to only required outings.  • Wash your hands with soap and water frequently for at least 20 seconds.   • Avoid touching your eyes, nose and mouth with unwashed hands.  • Do not share anything - utensils, drinking glasses, food from the same bowl.   • Sanitize household surfaces daily. Include all high touch areas (door handles, light switches, phones, countertops, etc.)    Call your surgeon immediately if you experience any of the following symptoms:  • Sore Throat  • Shortness of Breath or difficulty breathing  • Cough  • Chills  • Body soreness or muscle pain  • Headache  • Fever  • New loss of taste or smell  • Do not arrive for your surgery ill.  Your procedure will need to be rescheduled to another time.  You will need to call your physician before the day of surgery to avoid any unnecessary exposure to hospital staff as well as other patients.

## 2023-11-27 ENCOUNTER — DOCUMENTATION (OUTPATIENT)
Dept: PHYSICAL THERAPY | Facility: CLINIC | Age: 63
End: 2023-11-27
Payer: COMMERCIAL

## 2023-11-27 NOTE — PROGRESS NOTES
Discharge Summary  Discharge Summary from Physical Therapy Report      Dates  PT visit: 8/29/2022 - 1/12/2023    Number of Visits: 27     Discharge Status of Patient: See Progress Note dated 1/12/2023    Goals: Partially Met    Discharge Plan: Continue with current home exercise program as instructed    Comments : Pt made good progress toward goals. He was to scheduled additional PT after ortho f/u if needed. No additional appts were made.    Date of Discharge 11/27/2023        Yeny Kapadia, PT  Physical Therapist  [place] Lic# [id number]

## 2024-04-09 ENCOUNTER — TELEPHONE (OUTPATIENT)
Dept: FAMILY MEDICINE CLINIC | Facility: CLINIC | Age: 64
End: 2024-04-09
Payer: COMMERCIAL

## 2024-04-09 RX ORDER — MELOXICAM 15 MG/1
TABLET ORAL
Qty: 90 TABLET | Refills: 0 | Status: SHIPPED | OUTPATIENT
Start: 2024-04-09 | End: 2024-04-11 | Stop reason: SDUPTHER

## 2024-04-09 NOTE — TELEPHONE ENCOUNTER
"Alida needs clarification on Meloxicam 15mg prescription. The directions for taking the prescription says \"to be filled by provider\"     Please correct and resend.  "

## 2024-04-11 RX ORDER — MELOXICAM 15 MG/1
15 TABLET ORAL DAILY
Qty: 30 TABLET | Refills: 0 | Status: SHIPPED | OUTPATIENT
Start: 2024-04-11

## 2024-04-29 RX ORDER — TADALAFIL 5 MG/1
5 TABLET ORAL DAILY
Qty: 90 TABLET | Refills: 0 | OUTPATIENT
Start: 2024-04-29

## 2024-04-29 NOTE — TELEPHONE ENCOUNTER
Hub to share:    Patient must be seen for further refills of any medication. Patient cancelled his last appt and has not established with another provider since Dr. Michaels retired.

## 2024-12-11 ENCOUNTER — APPOINTMENT (OUTPATIENT)
Dept: GENERAL RADIOLOGY | Facility: HOSPITAL | Age: 64
End: 2024-12-11
Payer: COMMERCIAL

## 2024-12-11 ENCOUNTER — HOSPITAL ENCOUNTER (OUTPATIENT)
Facility: HOSPITAL | Age: 64
Setting detail: OBSERVATION
Discharge: HOME | End: 2024-12-12
Attending: EMERGENCY MEDICINE | Admitting: EMERGENCY MEDICINE
Payer: COMMERCIAL

## 2024-12-11 ENCOUNTER — NURSE TRIAGE (OUTPATIENT)
Dept: CALL CENTER | Facility: HOSPITAL | Age: 64
End: 2024-12-11
Payer: COMMERCIAL

## 2024-12-11 DIAGNOSIS — R00.1 BRADYCARDIA, UNSPECIFIED: ICD-10-CM

## 2024-12-11 DIAGNOSIS — R07.9 CHEST PAIN, UNSPECIFIED TYPE: ICD-10-CM

## 2024-12-11 DIAGNOSIS — R00.1 BRADYCARDIA: Primary | ICD-10-CM

## 2024-12-11 LAB
ALBUMIN SERPL-MCNC: 4.3 G/DL (ref 3.5–5.2)
ALBUMIN/GLOB SERPL: 2 G/DL
ALP SERPL-CCNC: 62 U/L (ref 39–117)
ALT SERPL W P-5'-P-CCNC: 8 U/L (ref 1–41)
ANION GAP SERPL CALCULATED.3IONS-SCNC: 8.2 MMOL/L (ref 5–15)
AST SERPL-CCNC: 23 U/L (ref 1–40)
BILIRUB SERPL-MCNC: 0.3 MG/DL (ref 0–1.2)
BUN SERPL-MCNC: 25 MG/DL (ref 8–23)
BUN/CREAT SERPL: 32.1 (ref 7–25)
CALCIUM SPEC-SCNC: 9.8 MG/DL (ref 8.6–10.5)
CHLORIDE SERPL-SCNC: 105 MMOL/L (ref 98–107)
CO2 SERPL-SCNC: 28.8 MMOL/L (ref 22–29)
CREAT SERPL-MCNC: 0.78 MG/DL (ref 0.76–1.27)
DEPRECATED RDW RBC AUTO: 47.4 FL (ref 37–54)
EGFRCR SERPLBLD CKD-EPI 2021: 99 ML/MIN/1.73
EOSINOPHIL # BLD MANUAL: 0.4 10*3/MM3 (ref 0–0.4)
EOSINOPHIL NFR BLD MANUAL: 7 % (ref 0.3–6.2)
ERYTHROCYTE [DISTWIDTH] IN BLOOD BY AUTOMATED COUNT: 13.2 % (ref 12.3–15.4)
GEN 5 1HR TROPONIN T REFLEX: 8 NG/L
GLOBULIN UR ELPH-MCNC: 2.1 GM/DL
GLUCOSE SERPL-MCNC: 82 MG/DL (ref 65–99)
HCT VFR BLD AUTO: 43.1 % (ref 37.5–51)
HGB BLD-MCNC: 13.9 G/DL (ref 13–17.7)
HOLD SPECIMEN: NORMAL
HOLD SPECIMEN: NORMAL
LYMPHOCYTES # BLD MANUAL: 1.79 10*3/MM3 (ref 0.7–3.1)
LYMPHOCYTES NFR BLD MANUAL: 6 % (ref 5–12)
MCH RBC QN AUTO: 31.2 PG (ref 26.6–33)
MCHC RBC AUTO-ENTMCNC: 32.3 G/DL (ref 31.5–35.7)
MCV RBC AUTO: 96.6 FL (ref 79–97)
MONOCYTES # BLD: 0.35 10*3/MM3 (ref 0.1–0.9)
NEUTROPHILS # BLD AUTO: 3.24 10*3/MM3 (ref 1.7–7)
NEUTROPHILS NFR BLD MANUAL: 56 % (ref 42.7–76)
PLAT MORPH BLD: NORMAL
PLATELET # BLD AUTO: 110 10*3/MM3 (ref 140–450)
PMV BLD AUTO: 11.6 FL (ref 6–12)
POTASSIUM SERPL-SCNC: 4.1 MMOL/L (ref 3.5–5.2)
PROT SERPL-MCNC: 6.4 G/DL (ref 6–8.5)
RBC # BLD AUTO: 4.46 10*6/MM3 (ref 4.14–5.8)
RBC MORPH BLD: NORMAL
SCAN SLIDE: NORMAL
SODIUM SERPL-SCNC: 142 MMOL/L (ref 136–145)
TROPONIN T NUMERIC DELTA: -1 NG/L
TROPONIN T SERPL HS-MCNC: 9 NG/L
VARIANT LYMPHS NFR BLD MANUAL: 27 % (ref 19.6–45.3)
VARIANT LYMPHS NFR BLD MANUAL: 4 % (ref 0–5)
WBC MORPH BLD: NORMAL
WBC NRBC COR # BLD AUTO: 5.78 10*3/MM3 (ref 3.4–10.8)
WHOLE BLOOD HOLD COAG: NORMAL
WHOLE BLOOD HOLD SPECIMEN: NORMAL

## 2024-12-11 PROCEDURE — 85007 BL SMEAR W/DIFF WBC COUNT: CPT | Performed by: EMERGENCY MEDICINE

## 2024-12-11 PROCEDURE — 80053 COMPREHEN METABOLIC PANEL: CPT | Performed by: EMERGENCY MEDICINE

## 2024-12-11 PROCEDURE — 99285 EMERGENCY DEPT VISIT HI MDM: CPT

## 2024-12-11 PROCEDURE — 36415 COLL VENOUS BLD VENIPUNCTURE: CPT

## 2024-12-11 PROCEDURE — 93005 ELECTROCARDIOGRAM TRACING: CPT | Performed by: EMERGENCY MEDICINE

## 2024-12-11 PROCEDURE — 71045 X-RAY EXAM CHEST 1 VIEW: CPT

## 2024-12-11 PROCEDURE — G0378 HOSPITAL OBSERVATION PER HR: HCPCS

## 2024-12-11 PROCEDURE — 93005 ELECTROCARDIOGRAM TRACING: CPT

## 2024-12-11 PROCEDURE — 85025 COMPLETE CBC W/AUTO DIFF WBC: CPT | Performed by: EMERGENCY MEDICINE

## 2024-12-11 PROCEDURE — 84484 ASSAY OF TROPONIN QUANT: CPT | Performed by: EMERGENCY MEDICINE

## 2024-12-11 RX ORDER — ASPIRIN 81 MG/1
324 TABLET, CHEWABLE ORAL ONCE
Status: DISCONTINUED | OUTPATIENT
Start: 2024-12-11 | End: 2024-12-12 | Stop reason: HOSPADM

## 2024-12-11 RX ORDER — TAMSULOSIN HYDROCHLORIDE 0.4 MG/1
1 CAPSULE ORAL DAILY
COMMUNITY

## 2024-12-11 RX ORDER — SODIUM CHLORIDE 0.9 % (FLUSH) 0.9 %
10 SYRINGE (ML) INJECTION AS NEEDED
Status: DISCONTINUED | OUTPATIENT
Start: 2024-12-11 | End: 2024-12-12 | Stop reason: HOSPADM

## 2024-12-11 RX ORDER — NALOXONE HCL 0.4 MG/ML
0.4 VIAL (ML) INJECTION
Status: DISCONTINUED | OUTPATIENT
Start: 2024-12-11 | End: 2024-12-12 | Stop reason: HOSPADM

## 2024-12-11 RX ORDER — NITROGLYCERIN 0.4 MG/1
0.4 TABLET SUBLINGUAL
Status: DISCONTINUED | OUTPATIENT
Start: 2024-12-11 | End: 2024-12-12 | Stop reason: HOSPADM

## 2024-12-11 RX ORDER — BISACODYL 10 MG
10 SUPPOSITORY, RECTAL RECTAL DAILY PRN
Status: DISCONTINUED | OUTPATIENT
Start: 2024-12-11 | End: 2024-12-12 | Stop reason: HOSPADM

## 2024-12-11 RX ORDER — ONDANSETRON 2 MG/ML
4 INJECTION INTRAMUSCULAR; INTRAVENOUS EVERY 6 HOURS PRN
Status: DISCONTINUED | OUTPATIENT
Start: 2024-12-11 | End: 2024-12-12 | Stop reason: HOSPADM

## 2024-12-11 RX ORDER — AMOXICILLIN 250 MG
2 CAPSULE ORAL 2 TIMES DAILY
Status: DISCONTINUED | OUTPATIENT
Start: 2024-12-11 | End: 2024-12-12 | Stop reason: HOSPADM

## 2024-12-11 RX ORDER — POLYETHYLENE GLYCOL 3350 17 G/17G
17 POWDER, FOR SOLUTION ORAL DAILY PRN
Status: DISCONTINUED | OUTPATIENT
Start: 2024-12-11 | End: 2024-12-12 | Stop reason: HOSPADM

## 2024-12-11 RX ORDER — BISACODYL 5 MG/1
5 TABLET, DELAYED RELEASE ORAL DAILY PRN
Status: DISCONTINUED | OUTPATIENT
Start: 2024-12-11 | End: 2024-12-12 | Stop reason: HOSPADM

## 2024-12-11 RX ORDER — MORPHINE SULFATE 2 MG/ML
1 INJECTION, SOLUTION INTRAMUSCULAR; INTRAVENOUS EVERY 4 HOURS PRN
Status: DISCONTINUED | OUTPATIENT
Start: 2024-12-11 | End: 2024-12-12 | Stop reason: HOSPADM

## 2024-12-11 NOTE — TELEPHONE ENCOUNTER
"Reason for Disposition  • Pain also in shoulder(s) or arm(s) or jaw  (Exception: Pain is clearly made worse by movement.)    Additional Information  • Negative: SEVERE difficulty breathing (e.g., struggling for each breath, speaks in single words)  • Negative: Difficult to awaken or acting confused (e.g., disoriented, slurred speech)  • Negative: Shock suspected (e.g., cold/pale/clammy skin, too weak to stand, low BP, rapid pulse)  • Negative: Passed out (i.e., lost consciousness, collapsed and was not responding)  • Negative: [1] Chest pain lasts > 5 minutes AND [2] age > 44  • Negative: [1] Chest pain lasts > 5 minutes AND [2] age > 30 AND [3] one or more cardiac risk factors (e.g., diabetes, high blood pressure, high cholesterol, smoker, or strong family history of heart disease)  • Negative: [1] Chest pain lasts > 5 minutes AND [2] history of heart disease (i.e., angina, heart attack, heart failure, bypass surgery, takes nitroglycerin)  • Negative: [1] Chest pain lasts > 5 minutes AND [2] described as crushing, pressure-like, or heavy  • Negative: Heart beating < 50 beats per minute OR > 140 beats per minute  • Negative: Visible sweat on face or sweat dripping down face  • Negative: Sounds like a life-threatening emergency to the triager  • Negative: Followed a chest injury  • Negative: SEVERE chest pain  • Negative: [1] Chest pain (or \"angina\") comes and goes AND [2] is happening more often (increasing in frequency) or getting worse (increasing in severity)  (Exception: Chest pains that last only a few seconds.)    Answer Assessment - Initial Assessment Questions  1. LOCATION: \"Where does it hurt?\"        Right side and radiating down shoulder and arm  2. RADIATION: \"Does the pain go anywhere else?\" (e.g., into neck, jaw, arms, back)      arms  3. ONSET: \"When did the chest pain begin?\" (Minutes, hours or days)       When he woke up about 20 minutes ago  4. PATTERN: \"Does the pain come and go, or has it been " "constant since it started?\"  \"Does it get worse with exertion?\"       Constant, but easing up now  5. DURATION: \"How long does it last\" (e.g., seconds, minutes, hours)      15   6. SEVERITY: \"How bad is the pain?\"  (e.g., Scale 1-10; mild, moderate, or severe)     - MILD (1-3): doesn't interfere with normal activities      - MODERATE (4-7): interferes with normal activities or awakens from sleep     - SEVERE (8-10): excruciating pain, unable to do any normal activities        moderate  7. CARDIAC RISK FACTORS: \"Do you have any history of heart problems or risk factors for heart disease?\" (e.g., angina, prior heart attack; diabetes, high blood pressure, high cholesterol, smoker, or strong family history of heart disease)      denies  8. PULMONARY RISK FACTORS: \"Do you have any history of lung disease?\"  (e.g., blood clots in lung, asthma, emphysema, birth control pills)      Denies   9. CAUSE: \"What do you think is causing the chest pain?\"      unknown  10. OTHER SYMPTOMS: \"Do you have any other symptoms?\" (e.g., dizziness, nausea, vomiting, sweating, fever, difficulty breathing, cough)        Hr 42  11. PREGNANCY: \"Is there any chance you are pregnant?\" \"When was your last menstrual period?\"        na    Protocols used: Chest Pain-ADULT-AH    "

## 2024-12-12 ENCOUNTER — APPOINTMENT (OUTPATIENT)
Dept: RESPIRATORY THERAPY | Facility: HOSPITAL | Age: 64
End: 2024-12-12
Payer: COMMERCIAL

## 2024-12-12 ENCOUNTER — APPOINTMENT (OUTPATIENT)
Dept: NUCLEAR MEDICINE | Facility: HOSPITAL | Age: 64
End: 2024-12-12
Payer: COMMERCIAL

## 2024-12-12 VITALS
DIASTOLIC BLOOD PRESSURE: 70 MMHG | RESPIRATION RATE: 15 BRPM | TEMPERATURE: 98.1 F | HEIGHT: 75 IN | WEIGHT: 212.3 LBS | OXYGEN SATURATION: 96 % | BODY MASS INDEX: 26.4 KG/M2 | HEART RATE: 43 BPM | SYSTOLIC BLOOD PRESSURE: 110 MMHG

## 2024-12-12 LAB
ANION GAP SERPL CALCULATED.3IONS-SCNC: 9 MMOL/L (ref 5–15)
BASOPHILS # BLD AUTO: 0.05 10*3/MM3 (ref 0–0.2)
BASOPHILS NFR BLD AUTO: 1 % (ref 0–1.5)
BH CV REST NUCLEAR ISOTOPE DOSE: 11 MCI
BH CV STRESS BP STAGE 1: NORMAL
BH CV STRESS BP STAGE 2: NORMAL
BH CV STRESS BP STAGE 3: NORMAL
BH CV STRESS BP STAGE 4: NORMAL
BH CV STRESS DURATION MIN STAGE 1: 3
BH CV STRESS DURATION MIN STAGE 2: 3
BH CV STRESS DURATION MIN STAGE 3: 3
BH CV STRESS DURATION MIN STAGE 4: 3
BH CV STRESS DURATION SEC STAGE 1: 0
BH CV STRESS DURATION SEC STAGE 2: 0
BH CV STRESS DURATION SEC STAGE 3: 0
BH CV STRESS DURATION SEC STAGE 4: 0
BH CV STRESS GRADE STAGE 1: 10
BH CV STRESS GRADE STAGE 2: 12
BH CV STRESS GRADE STAGE 3: 14
BH CV STRESS GRADE STAGE 4: 16
BH CV STRESS HR STAGE 1: 78
BH CV STRESS HR STAGE 2: 94
BH CV STRESS HR STAGE 3: 129
BH CV STRESS HR STAGE 4: 128
BH CV STRESS METS STAGE 1: 5
BH CV STRESS METS STAGE 2: 7.5
BH CV STRESS METS STAGE 3: 10
BH CV STRESS METS STAGE 4: 13.5
BH CV STRESS NUCLEAR ISOTOPE DOSE: 33 MCI
BH CV STRESS PROTOCOL 1: NORMAL
BH CV STRESS RECOVERY BP: NORMAL MMHG
BH CV STRESS RECOVERY HR: 59 BPM
BH CV STRESS SPEED STAGE 1: 1.7
BH CV STRESS SPEED STAGE 2: 2.5
BH CV STRESS SPEED STAGE 3: 3.4
BH CV STRESS SPEED STAGE 4: 4.2
BH CV STRESS STAGE 1: 1
BH CV STRESS STAGE 2: 2
BH CV STRESS STAGE 3: 3
BH CV STRESS STAGE 4: 4
BUN SERPL-MCNC: 22 MG/DL (ref 8–23)
BUN/CREAT SERPL: 27.8 (ref 7–25)
CALCIUM SPEC-SCNC: 9.2 MG/DL (ref 8.6–10.5)
CHLORIDE SERPL-SCNC: 109 MMOL/L (ref 98–107)
CHOLEST SERPL-MCNC: 117 MG/DL (ref 0–200)
CO2 SERPL-SCNC: 26 MMOL/L (ref 22–29)
CREAT SERPL-MCNC: 0.79 MG/DL (ref 0.76–1.27)
DEPRECATED RDW RBC AUTO: 47.3 FL (ref 37–54)
EGFRCR SERPLBLD CKD-EPI 2021: 98.6 ML/MIN/1.73
EOSINOPHIL # BLD AUTO: 0.19 10*3/MM3 (ref 0–0.4)
EOSINOPHIL NFR BLD AUTO: 3.8 % (ref 0.3–6.2)
ERYTHROCYTE [DISTWIDTH] IN BLOOD BY AUTOMATED COUNT: 13.2 % (ref 12.3–15.4)
GLUCOSE SERPL-MCNC: 81 MG/DL (ref 65–99)
HCT VFR BLD AUTO: 40 % (ref 37.5–51)
HDLC SERPL-MCNC: 49 MG/DL (ref 40–60)
HGB BLD-MCNC: 13.3 G/DL (ref 13–17.7)
IMM GRANULOCYTES # BLD AUTO: 0.02 10*3/MM3 (ref 0–0.05)
IMM GRANULOCYTES NFR BLD AUTO: 0.4 % (ref 0–0.5)
LDLC SERPL CALC-MCNC: 57 MG/DL (ref 0–100)
LDLC/HDLC SERPL: 1.2 {RATIO}
LV EF NUC BP: 69 %
LYMPHOCYTES # BLD AUTO: 1.25 10*3/MM3 (ref 0.7–3.1)
LYMPHOCYTES NFR BLD AUTO: 25.2 % (ref 19.6–45.3)
MAXIMAL PREDICTED HEART RATE: 155 BPM
MCH RBC QN AUTO: 31.8 PG (ref 26.6–33)
MCHC RBC AUTO-ENTMCNC: 33.3 G/DL (ref 31.5–35.7)
MCV RBC AUTO: 95.7 FL (ref 79–97)
MONOCYTES # BLD AUTO: 0.33 10*3/MM3 (ref 0.1–0.9)
MONOCYTES NFR BLD AUTO: 6.6 % (ref 5–12)
NEUTROPHILS NFR BLD AUTO: 3.13 10*3/MM3 (ref 1.7–7)
NEUTROPHILS NFR BLD AUTO: 63 % (ref 42.7–76)
NRBC BLD AUTO-RTO: 0 /100 WBC (ref 0–0.2)
PERCENT MAX PREDICTED HR: 83.23 %
PLATELET # BLD AUTO: 106 10*3/MM3 (ref 140–450)
PMV BLD AUTO: 11.9 FL (ref 6–12)
POTASSIUM SERPL-SCNC: 4.1 MMOL/L (ref 3.5–5.2)
QT INTERVAL: 464 MS
QTC INTERVAL: 414 MS
RBC # BLD AUTO: 4.18 10*6/MM3 (ref 4.14–5.8)
SODIUM SERPL-SCNC: 144 MMOL/L (ref 136–145)
STRESS BASELINE BP: NORMAL MMHG
STRESS BASELINE HR: 52 BPM
STRESS PERCENT HR: 98 %
STRESS POST ESTIMATED WORKLOAD: 10.2 METS
STRESS POST EXERCISE DUR MIN: 9 MIN
STRESS POST EXERCISE DUR SEC: 2 SEC
STRESS POST PEAK BP: NORMAL MMHG
STRESS POST PEAK HR: 129 BPM
STRESS TARGET HR: 132 BPM
T4 FREE SERPL-MCNC: 1.04 NG/DL (ref 0.93–1.7)
TRIGL SERPL-MCNC: 46 MG/DL (ref 0–150)
TSH SERPL DL<=0.05 MIU/L-ACNC: 2.35 UIU/ML (ref 0.27–4.2)
VLDLC SERPL-MCNC: 11 MG/DL (ref 5–40)
WBC NRBC COR # BLD AUTO: 4.97 10*3/MM3 (ref 3.4–10.8)

## 2024-12-12 PROCEDURE — G0378 HOSPITAL OBSERVATION PER HR: HCPCS

## 2024-12-12 PROCEDURE — 99204 OFFICE O/P NEW MOD 45 MIN: CPT | Performed by: INTERNAL MEDICINE

## 2024-12-12 PROCEDURE — 78452 HT MUSCLE IMAGE SPECT MULT: CPT

## 2024-12-12 PROCEDURE — 84439 ASSAY OF FREE THYROXINE: CPT | Performed by: PHYSICIAN ASSISTANT

## 2024-12-12 PROCEDURE — 93017 CV STRESS TEST TRACING ONLY: CPT

## 2024-12-12 PROCEDURE — 85025 COMPLETE CBC W/AUTO DIFF WBC: CPT | Performed by: EMERGENCY MEDICINE

## 2024-12-12 PROCEDURE — 93018 CV STRESS TEST I&R ONLY: CPT | Performed by: INTERNAL MEDICINE

## 2024-12-12 PROCEDURE — 84443 ASSAY THYROID STIM HORMONE: CPT | Performed by: PHYSICIAN ASSISTANT

## 2024-12-12 PROCEDURE — 80048 BASIC METABOLIC PNL TOTAL CA: CPT | Performed by: EMERGENCY MEDICINE

## 2024-12-12 PROCEDURE — A9502 TC99M TETROFOSMIN: HCPCS | Performed by: EMERGENCY MEDICINE

## 2024-12-12 PROCEDURE — 34310000005 TECHNETIUM TETROFOSMIN KIT: Performed by: EMERGENCY MEDICINE

## 2024-12-12 PROCEDURE — 78452 HT MUSCLE IMAGE SPECT MULT: CPT | Performed by: INTERNAL MEDICINE

## 2024-12-12 PROCEDURE — 80061 LIPID PANEL: CPT | Performed by: NURSE PRACTITIONER

## 2024-12-12 RX ORDER — CETIRIZINE HYDROCHLORIDE 10 MG/1
10 TABLET ORAL EVERY MORNING
Status: DISCONTINUED | OUTPATIENT
Start: 2024-12-12 | End: 2024-12-12 | Stop reason: HOSPADM

## 2024-12-12 RX ORDER — TAMSULOSIN HYDROCHLORIDE 0.4 MG/1
0.4 CAPSULE ORAL DAILY
Status: DISCONTINUED | OUTPATIENT
Start: 2024-12-12 | End: 2024-12-12 | Stop reason: HOSPADM

## 2024-12-12 RX ORDER — ACETAMINOPHEN 500 MG
1000 TABLET ORAL EVERY 6 HOURS PRN
Status: DISCONTINUED | OUTPATIENT
Start: 2024-12-12 | End: 2024-12-12 | Stop reason: HOSPADM

## 2024-12-12 RX ORDER — THYROID 30 MG/1
60 TABLET ORAL DAILY
Status: DISCONTINUED | OUTPATIENT
Start: 2024-12-12 | End: 2024-12-12 | Stop reason: HOSPADM

## 2024-12-12 RX ADMIN — TETROFOSMIN 1 DOSE: 1.38 INJECTION, POWDER, LYOPHILIZED, FOR SOLUTION INTRAVENOUS at 07:14

## 2024-12-12 RX ADMIN — TETROFOSMIN 1 DOSE: 1.38 INJECTION, POWDER, LYOPHILIZED, FOR SOLUTION INTRAVENOUS at 08:54

## 2024-12-12 NOTE — CASE MANAGEMENT/SOCIAL WORK
Case Management Discharge Note      Final Note: Routine home         Selected Continued Care - Discharged on 12/12/2024 Admission date: 12/11/2024 - Discharge disposition: Home or Self Care       Transportation Services  Private: Car    Final Discharge Disposition Code: 01 - home or self-care

## 2024-12-12 NOTE — CONSULTS
Referring Provider: No att. providers found    Reason for Consultation:      Chest pain  Right arm pain  Sinus bradycardia      Patient Care Team:  Torsten Simms MD as PCP - General (Family Medicine)      SUBJECTIVE     Chief Complaint: Right sided chest pain    History of present illness:  Robin Wheatley is a 64 y.o. male with a history of hypothyroidism who presented to [facility] with complaint of right sided chest pain.    Patient is a pleasant 64-year-old male who is not known to have coronary artery disease.  He reports he awoke from sleep with complaints of pain in the right side of his chest radiating down his right arm and his right hand felt cold.    He reports he has chronic bilateral shoulder issues and has had previous shoulder replacements    The patient came to the emergency room at [facility].    On admission his EKG did not show acute ST or T wave segment abnormalities.  He was bradycardic.  TSH was within normal limits.He underwent stress Myoview which showed no evidence of reversible ischemia.  The patient exercised on the treadmill for 9 minutes achieving a maximum heart rate of 129 beats a minute.      Review of systems:    Head: Atraumatic, normocephalic  Eyes: No conjunctival injection or redness noted.  No discharge  Neck: No JVD, no lymphadenopathy or carotid bruit  Chest: No obvious deformity noted  Lungs: Air entry is present in all lung zones.  Decreased breath sounds at the bases.  No crackles or rhonchi  Heart: Normal S1 and S2.  No pericardial rub or murmur  Neuro: Neurological exam was grossly intact.  No focal deficit.  Psychiatric: Patient appears to be emotionally stable.  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    ED (erectile dysfunction)     History of chronic sinusitis     Hx of difficult intubation     X1 only more than 10  years ago    Hypertension     Kidney stone     Obesity (BMI 30-39.9)     Osteoarthritis of left glenohumeral joint 2021    Added automatically from request for surgery [id number]    Sleep apnea     has CPAP  bring dos    Spinal stenosis     Testicular hypofunction     Thrombocytopenia, chronic     Thyroid activity decreased     supplemented by 25 Again    Ureteral calculus 2022    Vertigo     hx       Past Surgical History:   Procedure Laterality Date    CATARACT EXTRACTION Right     COLON SURGERY      probable trauma related    COLONOSCOPY      KNEE ARTHROSCOPY Bilateral     X3    QUADRICEPS TENDON REPAIR Left     RETINAL DETACHMENT REPAIR Right     SEPTOPLASTY      SHOULDER SURGERY Bilateral     SINUS SURGERY      TOTAL SHOULDER ARTHROPLASTY W/ DISTAL CLAVICLE EXCISION Left 2021    Procedure: TOTAL SHOULDER REVERSE ARTHROPLASTY;  Surgeon: Carl Hairston MD;  Location: [facility] OR;  Service: Orthopedics;  Laterality: Left;    TOTAL SHOULDER ARTHROPLASTY W/ DISTAL CLAVICLE EXCISION Right 2022    Procedure: TOTAL SHOULDER REVERSE ARTHROPLASTY;  Surgeon: Carl Hairston MD;  Location: [facility] OR;  Service: Orthopedics;  Laterality: Right;    URETEROSCOPY LASER LITHOTRIPSY WITH STENT INSERTION Left 2022    Procedure: CYSTOSCOPY, LEFT URETEROSCOPY, BILATERAL RETROGRADE PYELOGRAM;  Surgeon: Aung Bolton MD;  Location: [facility] OR;  Service: Urology;  Laterality: Left;    UVULOPALATOPHARYNGOPLASTY         Family History   Problem Relation Age of Onset    Cancer Mother     Malig Hyperthermia Neg Hx        Social History     Tobacco Use    Smoking status: Former     Current packs/day: 0.00     Average packs/day: 0.5 packs/day for 30.0 years (15.0 ttl pk-yrs)     Types: Cigarettes     Start date: 1985     Quit date: 2015     Years since quittin.9    Smokeless tobacco: Never   Vaping Use    Vaping status: Never Used   Substance Use Topics    Alcohol  "use: Not Currently     Comment: BEER OCCASIONALLY    Drug use: Never        Home meds:  Prior to Admission medications    Medication Sig Start Date End Date Taking? Authorizing Provider   Amino Acids (GLUTARADE AMINO ACID BLEND PO) Take  by mouth. 3/6/24  Yes Savana Serrano MD Armour Thyroid 60 MG tablet Take 1 tablet by mouth Daily. 9/9/23  Yes Marlo Michaels Jr., MD   DHEA 25 MG capsule Take 1 capsule by mouth Daily.   Yes Savana Serrano MD   fluticasone (FLONASE) 50 MCG/ACT nasal spray Administer 1 spray into the nostril(s) as directed by provider As Needed.   Yes Savana Serrano MD   meloxicam (MOBIC) 15 MG tablet Take 1 tablet by mouth Daily. 4/11/24  Yes Demetria Carranza APRN   tamsulosin (FLOMAX) 0.4 MG capsule 24 hr capsule Take 1 capsule by mouth Daily.   Yes Savana Serrano MD   vitamin D3 125 MCG (5000 UT) capsule capsule Take 1 capsule by mouth Daily.   Yes Savana Serrano MD   Vitamins-Lipotropics ([employer] Inner Ear Plus) tablet Take 1 tablet by mouth 2 (Two) Times a Day.   Yes Savana Serrano MD   acetaminophen (TYLENOL) 500 MG tablet Take 2 tablets by mouth As Needed for Mild Pain.    Savana Serrano MD   ALLERGY SERUM INJECTION Inject 0.16 mL under the skin into the appropriate area as directed 1 (One) Time Per Week. wednesday    Savana Serrano MD   cetirizine (zyrTEC) 10 MG tablet Take 1 tablet by mouth Every Morning.    Savana Serrano MD   Syringe/Needle, Disp, 21G X 1\" 3 ML misc BD ECLIPSE SYRINGE 21G X 1\" 3 ML 4/17/15   Savana Serrano MD   Vitamins-Lipotropics (LIPO FLAVONOID PLUS PO)     Savana Serrano MD       Allergies:     Ciprofloxacin, Tamsulosin, and Levofloxacin    Scheduled Meds:    Continuous Infusions:No current facility-administered medications for this encounter.    PRN Meds:      OBJECTIVE    Vital Signs  Vitals:    12/11/24 2131 12/11/24 2307 12/12/24 0409 12/12/24 0714   BP: 128/73 119/74 122/75 110/70   BP " "Location:  Right arm Right arm Right arm   Patient Position:  Lying Lying Lying   Pulse: (!) 41 (!) 48 (!) 44 (!) 43   Resp:  12 13 15   Temp:  97.7 °F (36.5 °C) 98 °F (36.7 °C) 98.1 °F (36.7 °C)   TempSrc:  Oral Oral Oral   SpO2: 97% 98%  96%   Weight:  96.3 kg (212 lb 4.9 oz)     Height:  190.5 cm (75\")         Flowsheet Rows      Flowsheet Row First Filed Value   Admission Height 190.5 cm (75\") Documented at 12/11/2024 1907   Admission Weight 96.3 kg (212 lb 4.9 oz) Documented at 12/11/2024 1907            No intake or output data in the 24 hours ending 12/12/24 1835     Telemetry: Sinus bradycardia    Physical Exam:  The patient is alert, oriented and in no distress.  Vital signs as noted above.  Head and neck revealed no carotid bruits or jugular venous distention.   Lungs clear.  No wheezing.  Breath sounds are normal bilaterally.  Heart: Normal first and second heart sounds. No murmur.  No precordial rub is present.  No gallop is present.  Abdomen: Soft and nontender.    Extremities with good peripheral pulses without any pedal edema.  Skin: Warm and dry.  Musculoskeletal system is grossly normal.  CNS grossly normal.       Results Review:  I have personally reviewed the results from the time of this admission to 12/12/2024 18:35 EST and agree with these findings:  []  Laboratory  []  Microbiology  []  Radiology  []  EKG/Telemetry   []  Cardiology/Vascular   []  Pathology  []  Old records  []  Other:    Most notable findings include:     Lab Results (last 24 hours)       Procedure Component Value Units Date/Time    Lipid Panel [[id number]] Collected: 12/12/24 0506    Specimen: Blood Updated: 12/12/24 1013     Total Cholesterol 117 mg/dL      Triglycerides 46 mg/dL      HDL Cholesterol 49 mg/dL      LDL Cholesterol  57 mg/dL      VLDL Cholesterol 11 mg/dL      LDL/HDL Ratio 1.20    Narrative:      Cholesterol Reference Ranges  (U.S. Department of Health and Human Services ATP III Classifications)    Desirable   "        <200 mg/dL  Borderline High    200-239 mg/dL  High Risk          >240 mg/dL      Triglyceride Reference Ranges  (U.S. Department of Health and Human Services ATP III Classifications)    Normal           <150 mg/dL  Borderline High  150-199 mg/dL  High             200-499 mg/dL  Very High        >500 mg/dL    HDL Reference Ranges  (U.S. Department of Health and Human Services ATP III Classifications)    Low     <40 mg/dl (major risk factor for CHD)  High    >60 mg/dl ('negative' risk factor for CHD)        LDL Reference Ranges  (U.S. Department of Health and Human Services ATP III Classifications)    Optimal          <100 mg/dL  Near Optimal     100-129 mg/dL  Borderline High  130-159 mg/dL  High             160-189 mg/dL  Very High        >189 mg/dL    TSH [[id number]]  (Normal) Collected: 12/12/24 0506    Specimen: Blood Updated: 12/12/24 0713     TSH 2.350 uIU/mL     T4, Free [[id number]]  (Normal) Collected: 12/12/24 0506    Specimen: Blood Updated: 12/12/24 0713     Free T4 1.04 ng/dL     Basic Metabolic Panel [[id number]]  (Abnormal) Collected: 12/12/24 0506    Specimen: Blood Updated: 12/12/24 0553     Glucose 81 mg/dL      BUN 22 mg/dL      Creatinine 0.79 mg/dL      Sodium 144 mmol/L      Potassium 4.1 mmol/L      Chloride 109 mmol/L      CO2 26.0 mmol/L      Calcium 9.2 mg/dL      BUN/Creatinine Ratio 27.8     Anion Gap 9.0 mmol/L      eGFR 98.6 mL/min/1.73     Narrative:      GFR Categories in Chronic Kidney Disease (CKD)      GFR Category          GFR (mL/min/1.73)    Interpretation  G1                     90 or greater         Normal or high (1)  G2                      60-89                Mild decrease (1)  G3a                   45-59                Mild to moderate decrease  G3b                   30-44                Moderate to severe decrease  G4                    15-29                Severe decrease  G5                    14 or less           Kidney failure          (1)In the absence of  evidence of kidney disease, neither GFR category G1 or G2 fulfill the criteria for CKD.    eGFR calculation 2021 CKD-EPI creatinine equation, which does not include race as a factor    CBC & Differential [[id number]]  (Abnormal) Collected: 12/12/24 0506    Specimen: Blood Updated: 12/12/24 0524    Narrative:      The following orders were created for panel order CBC & Differential.  Procedure                               Abnormality         Status                     ---------                               -----------         ------                     CBC Auto Differential[[id number]]        Abnormal            Final result                 Please view results for these tests on the individual orders.    CBC Auto Differential [[id number]]  (Abnormal) Collected: 12/12/24 0506    Specimen: Blood Updated: 12/12/24 0524     WBC 4.97 10*3/mm3      RBC 4.18 10*6/mm3      Hemoglobin 13.3 g/dL      Hematocrit 40.0 %      MCV 95.7 fL      MCH 31.8 pg      MCHC 33.3 g/dL      RDW 13.2 %      RDW-SD 47.3 fl      MPV 11.9 fL      Platelets 106 10*3/mm3      Neutrophil % 63.0 %      Lymphocyte % 25.2 %      Monocyte % 6.6 %      Eosinophil % 3.8 %      Basophil % 1.0 %      Immature Grans % 0.4 %      Neutrophils, Absolute 3.13 10*3/mm3      Lymphocytes, Absolute 1.25 10*3/mm3      Monocytes, Absolute 0.33 10*3/mm3      Eosinophils, Absolute 0.19 10*3/mm3      Basophils, Absolute 0.05 10*3/mm3      Immature Grans, Absolute 0.02 10*3/mm3      nRBC 0.0 /100 WBC     High Sensitivity Troponin T 1Hr [[id number]]  (Normal) Collected: 12/11/24 2031    Specimen: Blood Updated: 12/11/24 2103     HS Troponin T 8 ng/L      Troponin T Delta -1 ng/L     Narrative:      High Sensitive Troponin T Reference Range:  <14.0 ng/L- Negative Female for AMI  <22.0 ng/L- Negative Male for AMI  >=14 - Abnormal Female indicating possible myocardial injury.  >=22 - Abnormal Male indicating possible myocardial injury.   Clinicians would have to utilize  clinical acumen, EKG, Troponin, and serial changes to determine if it is an Acute Myocardial Infarction or myocardial injury due to an underlying chronic condition.         Comprehensive Metabolic Panel [[id number]]  (Abnormal) Collected: 12/11/24 1926    Specimen: Blood Updated: 12/11/24 1956     Glucose 82 mg/dL      BUN 25 mg/dL      Creatinine 0.78 mg/dL      Sodium 142 mmol/L      Potassium 4.1 mmol/L      Chloride 105 mmol/L      CO2 28.8 mmol/L      Calcium 9.8 mg/dL      Total Protein 6.4 g/dL      Albumin 4.3 g/dL      ALT (SGPT) 8 U/L      AST (SGOT) 23 U/L      Alkaline Phosphatase 62 U/L      Total Bilirubin 0.3 mg/dL      Globulin 2.1 gm/dL      A/G Ratio 2.0 g/dL      BUN/Creatinine Ratio 32.1     Anion Gap 8.2 mmol/L      eGFR 99.0 mL/min/1.73     Narrative:      GFR Categories in Chronic Kidney Disease (CKD)      GFR Category          GFR (mL/min/1.73)    Interpretation  G1                     90 or greater         Normal or high (1)  G2                      60-89                Mild decrease (1)  G3a                   45-59                Mild to moderate decrease  G3b                   30-44                Moderate to severe decrease  G4                    15-29                Severe decrease  G5                    14 or less           Kidney failure          (1)In the absence of evidence of kidney disease, neither GFR category G1 or G2 fulfill the criteria for CKD.    eGFR calculation 2021 CKD-EPI creatinine equation, which does not include race as a factor    High Sensitivity Troponin T [[id number]]  (Normal) Collected: 12/11/24 1926    Specimen: Blood Updated: 12/11/24 1956     HS Troponin T 9 ng/L     Narrative:      High Sensitive Troponin T Reference Range:  <14.0 ng/L- Negative Female for AMI  <22.0 ng/L- Negative Male for AMI  >=14 - Abnormal Female indicating possible myocardial injury.  >=22 - Abnormal Male indicating possible myocardial injury.   Clinicians would have to utilize clinical  acumen, EKG, Troponin, and serial changes to determine if it is an Acute Myocardial Infarction or myocardial injury due to an underlying chronic condition.         CBC & Differential [[id number]]  (Abnormal) Collected: 12/11/24 1926    Specimen: Blood Updated: 12/11/24 1947    Narrative:      The following orders were created for panel order CBC & Differential.  Procedure                               Abnormality         Status                     ---------                               -----------         ------                     CBC Auto Differential[[id number]]        Abnormal            Final result               Scan Slide[[id number]]                                       Final result                 Please view results for these tests on the individual orders.    Manual Differential [[id number]]  (Abnormal) Collected: 12/11/24 1926    Specimen: Blood Updated: 12/11/24 1947     Neutrophil % 56.0 %      Lymphocyte % 27.0 %      Monocyte % 6.0 %      Eosinophil % 7.0 %      Atypical Lymphocyte % 4.0 %      Neutrophils Absolute 3.24 10*3/mm3      Lymphocytes Absolute 1.79 10*3/mm3      Monocytes Absolute 0.35 10*3/mm3      Eosinophils Absolute 0.40 10*3/mm3      RBC Morphology Normal     WBC Morphology Normal     Platelet Morphology Normal    CBC Auto Differential [[id number]]  (Abnormal) Collected: 12/11/24 1926    Specimen: Blood Updated: 12/11/24 1947     WBC 5.78 10*3/mm3      RBC 4.46 10*6/mm3      Hemoglobin 13.9 g/dL      Hematocrit 43.1 %      MCV 96.6 fL      MCH 31.2 pg      MCHC 32.3 g/dL      RDW 13.2 %      RDW-SD 47.4 fl      MPV 11.6 fL      Platelets 110 10*3/mm3     Scan Slide [[id number]] Collected: 12/11/24 1926    Specimen: Blood Updated: 12/11/24 1947     Scan Slide --     Comment: See Manual Differential Results       [place] Draw [[id number]] Collected: 12/11/24 1926    Specimen: Blood Updated: 12/11/24 1931    Narrative:      The following orders were created for panel order [place]  Draw.  Procedure                               Abnormality         Status                     ---------                               -----------         ------                     Green Top (Gel)[[id number]]                                  Final result               Lavender Top[[id number]]                                     Final result               Gold Top - SST[[id number]]                                   Final result               Light Blue Top[[id number]]                                   Final result                 Please view results for these tests on the individual orders.    Lavender Top [[id number]] Collected: 12/11/24 1926    Specimen: Blood Updated: 12/11/24 1931     Extra Tube hold for add-on     Comment: Auto resulted       Green Top (Gel) [[id number]] Collected: 12/11/24 1926    Specimen: Blood Updated: 12/11/24 1931     Extra Tube Hold for add-ons.     Comment: Auto resulted.       Gold Top - SST [[id number]] Collected: 12/11/24 1926    Specimen: Blood Updated: 12/11/24 1931     Extra Tube Hold for add-ons.     Comment: Auto resulted.       Light Blue Top [[id number]] Collected: 12/11/24 1926    Specimen: Blood Updated: 12/11/24 1931     Extra Tube Hold for add-ons.     Comment: Auto resulted               Imaging Results (Last 24 Hours)       Procedure Component Value Units Date/Time    XR Chest 1 View [[id number]] Collected: 12/11/24 1937     Updated: 12/11/24 1940    Narrative:      XR CHEST 1 VW    Date of Exam: 12/11/2024 7:22 PM EST    Indication: Chest Pain Protocol  Chest Pain Protocol    Comparison: Chest AP dated 8/12/2022    Findings:  The lungs are clear bilaterally. The cardiac and mediastinal silhouettes appear normal. No effusion is seen.      Impression:      Impression:  No acute cardiopulmonary disease.      Electronically Signed: Cheng Petersen MD    12/11/2024 7:38 PM EST    Workstation ID: [id number]            LAB RESULTS (LAST 7 DAYS)    CBC  Results from last 7 days   Lab Units  12/12/24  0506 12/11/24 1926   WBC 10*3/mm3 4.97 5.78   RBC 10*6/mm3 4.18 4.46   HEMOGLOBIN g/dL 13.3 13.9   HEMATOCRIT % 40.0 43.1   MCV fL 95.7 96.6   PLATELETS 10*3/mm3 106* 110*       BMP  Results from last 7 days   Lab Units 12/12/24  0506 12/11/24 1926   SODIUM mmol/L 144 142   POTASSIUM mmol/L 4.1 4.1   CHLORIDE mmol/L 109* 105   CO2 mmol/L 26.0 28.8   BUN mg/dL 22 25*   CREATININE mg/dL 0.79 0.78   GLUCOSE mg/dL 81 82       CMP   Results from last 7 days   Lab Units 12/12/24  0506 12/11/24  1926   SODIUM mmol/L 144 142   POTASSIUM mmol/L 4.1 4.1   CHLORIDE mmol/L 109* 105   CO2 mmol/L 26.0 28.8   BUN mg/dL 22 25*   CREATININE mg/dL 0.79 0.78   GLUCOSE mg/dL 81 82   ALBUMIN g/dL  --  4.3   BILIRUBIN mg/dL  --  0.3   ALK PHOS U/L  --  62   AST (SGOT) U/L  --  23   ALT (SGPT) U/L  --  8       BNP        TROPONIN  Results from last 7 days   Lab Units 12/11/24 2031   HSTROP T ng/L 8       CoAg        Creatinine Clearance  Estimated Creatinine Clearance: 127 mL/min (by C-G formula based on SCr of 0.79 mg/dL).    ABG          Radiology  XR Chest 1 View    Result Date: 12/11/2024  Impression: No acute cardiopulmonary disease. Electronically Signed: Cheng Petersen MD  12/11/2024 7:38 PM EST  Workstation ID: [id number]       EKG  I personally viewed and interpreted the patient's EKG/Telemetry data:  ECG 12 Lead Chest Pain   Final Result   HEART RATE=48  bpm   RR Kbnekwyf=0841  ms   [place] Qneqshhc=362  ms   P Horizontal Axis=-17  deg   P Front Axis=55  deg   QRSD Interval=93  ms   QT Aljrasgo=758  ms   HStK=266  ms   QRS Axis=52  deg   T Wave Axis=64  deg   - ABNORMAL ECG -   Sinus bradycardia   Electronically Signed By: Herve Kirk ([facility]) 2024-12-12 07:33:59   Date and Time of Study:2024-12-11 19:18:44                  Echocardiogram:          Stress Test:  Results for orders placed during the hospital encounter of 12/11/24    Stress Test With Myocardial Perfusion One Day    Interpretation Summary    Myocardial  perfusion imaging indicates a normal myocardial perfusion study with no evidence of ischemia. Impressions are consistent with a low risk study.    Left ventricular ejection fraction is normal (Calculated EF = 69%).    Low risk for ischemic heart disease.    This is normal Cardiolite imaging stress test with no evidence of ischemia or myocardial infarction.  Left ventricle size and function is normal on gated SPECT imaging.  No wall motion abnormality was noted.  Clinical correlation recommended.  Further recommendation as per ordering physician. .    Findings consistent with a normal ECG stress test.        Cardiac Catheterization:  No results found for this or any previous visit.        Other:      ASSESSMENT & PLAN:    Principal Problem:    Chest pain    Chest pain  MI ruled out  EKG without acute ST or T wave segment abnormalities  Isolated event  Not component of exertion  Stress Myoview with no evidence of reversible ischemia and evidence of chronotropic competence    Sinus bradycardia  Patient reports this is chronic for him  Patient had chronotropic competence on treadmill achieving max heart rate predicted for age  No associated symptoms of dizziness or lightheadedness    Continue aggressive risk factor modification  Check lipid panel  Consider M cot at discharge but patient currently not having symptoms of dizziness, lightheadedness or near syncope  Additional recommendations per Dr. Cruz    Patient is seen and examined and findings are verified.  All data is reviewed by me personally.  Assessment and plan formulated by APC was done after discussion with attending.  I spent more than 50% of time in taking care of the patient.    Patient was admitted with symptom of chest pain.    Hemodynamics are stable    Normal S1 and S2.  No pericardial rub or murmur    Patient chest pain is atypical.  Troponins are negative EKG was unremarkable.  Patient underwent stress test and Cardiolite imaging showed no ischemia.   Patient showed chronotropic response to exercise is preserved.    At this stage I would recommend to proceed with MCOT as a discharge.  Patient can be discharged and follow-up with me in the office    Electronically signed by Shalom Cruz MD, 12/12/24, 6:35 PM EST.      Shalom Cruz MD  12/12/24  18:36 EST

## 2024-12-12 NOTE — CASE MANAGEMENT/SOCIAL WORK
Discharge Planning Assessment   Rafael     Patient Name: Robin Wheatley  MRN: [id number]  Today's Date: 12/12/2024    Admit Date: 12/11/2024    Plan: Routine home   Discharge Needs Assessment       Row Name 12/12/24 1020       Living Environment    People in Home spouse    Name(s) of People in Home wife Maria Alejandra    Current Living Arrangements home    Potentially Unsafe Housing Conditions none    In the past 12 months has the electric, gas, oil, or water company threatened to shut off services in your home? No    Primary Care Provided by self    Provides Primary Care For no one    Family Caregiver if Needed spouse    Family Caregiver Names Maria Alejandra    Quality of Family Relationships helpful;involved;supportive    Able to Return to Prior Arrangements yes       Resource/Environmental Concerns    Resource/Environmental Concerns none    Transportation Concerns none       Transportation Needs    In the past 12 months, has lack of transportation kept you from medical appointments or from getting medications? no    In the past 12 months, has lack of transportation kept you from meetings, work, or from getting things needed for daily living? No       Food Insecurity    Within the past 12 months, you worried that your food would run out before you got the money to buy more. Never true    Within the past 12 months, the food you bought just didn't last and you didn't have money to get more. Never true       Transition Planning    Patient/Family Anticipates Transition to home with family    Patient/Family Anticipated Services at Transition none    Transportation Anticipated car, drives self;family or friend will provide       Discharge Needs Assessment    Readmission Within the Last 30 Days no previous admission in last 30 days    Equipment Currently Used at Home cpap  [employer]    Concerns to be Addressed denies needs/concerns at this time    Anticipated Changes Related to Illness none    Equipment Needed After Discharge none                    Discharge Plan       Row Name 12/12/24 1021       Plan    Plan Routine home    Plan Comments CM met with patient at the bedside. Confirmed PCP, insurance, and pharmacy. Patient declined M2B. Patient denies any difficulty affording medications. Patient is not current with any HHC/OPPT/OT services. Patient lives at home with his wife, is independent with ADLS/IADLS, and drives. Wife able to provide DC transport. DC orders noted, no further needs at this time.                  Continued Care and Services - Admitted Since 12/11/2024    No active coordination exists for this encounter.       Expected Discharge Date and Time       Expected Discharge Date Expected Discharge Time    Dec 12, 2024            Demographic Summary       Row Name 12/12/24 1020       General Information    Admission Type observation    Arrived From emergency department    Referral Source admission list    Reason for Consult discharge planning    Preferred Language English       Contact Information    Permission Granted to Share Info With     Contact Information Obtained for                    Functional Status       Row Name 12/12/24 1020       Functional Status    Usual Activity Tolerance good    Current Activity Tolerance good       Functional Status, IADL    Medications independent    Meal Preparation independent    Housekeeping independent    Laundry independent    Shopping independent           Isidro Evans RN     Cell number [phone]  Office number [phone]

## 2024-12-12 NOTE — PLAN OF CARE
Problem: Adult Inpatient Plan of Care  Goal: Absence of Hospital-Acquired Illness or Injury  Intervention: Identify and Manage Fall Risk  Recent Flowsheet Documentation  Taken 12/12/2024 0200 by Yasmine Turcios, RN  Safety Promotion/Fall Prevention: safety round/check completed  Taken 12/12/2024 0000 by Yasmine Turcios, RN  Safety Promotion/Fall Prevention: safety round/check completed   Goal Outcome Evaluation:      Pt had no complaints through the night. Npo since mn. Ed gave pt coffee around 2230. Cardio consult and myoview for today.

## 2024-12-12 NOTE — ED PROVIDER NOTES
"Subjective   History of Present Illness  64-year-old male describes waking up from sleep this evening at around 530 with some right sided chest pain rating to his right arm.  States it lasted about 15 to 20 minutes and then resolved.  He described as deep pain.  He reports no fevers or chills or diaphoresis or palpitation or shortness of breath or syncope.  He reports no nausea vomiting or abdominal pain.  He reported no relieving or exacerbating factors.  He states he does work third shift.  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    ED (erectile dysfunction)     History of chronic sinusitis     Hx of difficult intubation     X1 only more than 10 years ago    Hypertension     Kidney stone     Obesity (BMI 30-39.9)     Osteoarthritis of left glenohumeral joint 01/07/2021    Added automatically from request for surgery [id number]    Sleep apnea     has CPAP  bring dos    Spinal stenosis     Testicular hypofunction     Thrombocytopenia, chronic     Thyroid activity decreased     supplemented by 25 Again    Ureteral calculus 01/14/2022    Vertigo     hx       Allergies   Allergen Reactions    Ciprofloxacin Nausea Only    Tamsulosin Nausea Only and GI Intolerance     \"BLOATING\"    Levofloxacin GI Intolerance       Past Surgical History:   Procedure Laterality Date    CATARACT EXTRACTION Right     COLON SURGERY      probable trauma related    COLONOSCOPY      KNEE ARTHROSCOPY Bilateral     X3    QUADRICEPS TENDON REPAIR Left     RETINAL DETACHMENT REPAIR Right     SEPTOPLASTY      SHOULDER SURGERY Bilateral     SINUS SURGERY      TOTAL SHOULDER ARTHROPLASTY W/ DISTAL CLAVICLE EXCISION Left 01/19/2021    Procedure: TOTAL SHOULDER REVERSE ARTHROPLASTY;  Surgeon: Carl Hairston MD;  Location: [facility] MAIN OR;  Service: Orthopedics;  Laterality: Left;    TOTAL SHOULDER ARTHROPLASTY W/ DISTAL CLAVICLE EXCISION Right 8/24/2022    Procedure: TOTAL SHOULDER " REVERSE ARTHROPLASTY;  Surgeon: Carl Hairston MD;  Location: [facility] MAIN OR;  Service: Orthopedics;  Laterality: Right;    URETEROSCOPY LASER LITHOTRIPSY WITH STENT INSERTION Left 2022    Procedure: CYSTOSCOPY, LEFT URETEROSCOPY, BILATERAL RETROGRADE PYELOGRAM;  Surgeon: Aung Bolton MD;  Location: [facility] MAIN OR;  Service: Urology;  Laterality: Left;    UVULOPALATOPHARYNGOPLASTY         Family History   Problem Relation Age of Onset    Cancer Mother     Malig Hyperthermia Neg Hx        Social History     Socioeconomic History    Marital status:    Tobacco Use    Smoking status: Former     Current packs/day: 0.00     Average packs/day: 0.5 packs/day for 30.0 years (15.0 ttl pk-yrs)     Types: Cigarettes     Start date: 1985     Quit date: 2015     Years since quittin.9    Smokeless tobacco: Never   Vaping Use    Vaping status: Never Used   Substance and Sexual Activity    Alcohol use: Yes     Comment: BEER OCCASIONALLY    Drug use: Never    Sexual activity: Defer       Prior to Admission medications    Medication Sig Start Date End Date Taking? Authorizing Provider   Yolanda Thyroid 60 MG tablet Take 1 tablet by mouth Daily. 23   Marlo Michaels Jr., MD   acetaminophen (TYLENOL) 500 MG tablet Take 2 tablets by mouth As Needed for Mild Pain.    Savana Serrano MD   ALLERGY SERUM INJECTION Inject 0.16 mL under the skin into the appropriate area as directed 1 (One) Time Per Week. wednesday    Savana Serrano MD   Amino Acids (GLUTARADE AMINO ACID BLEND PO) Take  by mouth. 3/6/24   Savana Serrano MD   amoxicillin (AMOXIL) 500 MG tablet  24   Savana Serrano MD   cetirizine (zyrTEC) 10 MG tablet Take 1 tablet by mouth Every Morning.    Savana Serrano MD   Collagen-Vitamin C-Biotin (Collagen 1500/C) 500-50-0.8 MG capsule Collagen, 1 tsp., Daily, joint supplement, 0 Refill(s) 3/6/24   Savana Serrano MD   DHEA 25 MG capsule Take 1  "capsule by mouth Daily.    Savana Serrano MD   Docosahexaenoic Acid (DHA OMEGA 3 PO) Take 1 tablet by mouth 2 (Two) Times a Day.    Savana Serrano MD   fluticasone (FLONASE) 50 MCG/ACT nasal spray 2 sprays into the nostril(s) as directed by provider As Needed.    Savana Serrano MD   Hyaluronic Acid-Vitamin C (HYALURONIC ACID PO) Take 1 tablet by mouth Daily.    Savana Serrano MD   meloxicam (MOBIC) 15 MG tablet Take 1 tablet by mouth Daily. 4/11/24   Demetria Carranza APRN   Syringe/Needle, Disp, 21G X 1\" 3 ML misc BD ECLIPSE SYRINGE 21G X 1\" 3 ML 4/17/15   Savana Serrano MD   tadalafil (CIALIS) 5 MG tablet Take 1 tablet by mouth Daily. 9/9/23   Marlo Michaels Jr., MD   vitamin D3 125 MCG (5000 UT) capsule capsule Take 1 capsule by mouth Daily.    Savana Serrano MD   Vitamins-Lipotropics ([facility] Plus) tablet Take 1 tablet by mouth 2 (Two) Times a Day.    Savana Serrano MD   Vitamins-Lipotropics (LIPO FLAVONOID PLUS PO)     Savana Serrano MD     /73   Pulse (!) 41   Temp 97.5 °F (36.4 °C) (Temporal)   Resp 19   Ht 190.5 cm (75\")   Wt 96.3 kg (212 lb 4.9 oz)   SpO2 97%   BMI 26.54 kg/m²       Objective   Physical Exam  General: Well-developed well-appearing, no acute distress, alert and appropriate  Eyes:  sclera nonicteric  HEENT: Mucous membranes moist, no mucosal swelling  Neck: Supple, no nuchal rigidity,   Respirations: Respirations nonlabored, equal breath sounds bilaterally, clear lungs  Heart regular rate and rhythm, no murmurs rubs or gallops, equal pulses in bilateral upper extremities  Abdomen soft nontender nondistended, no hepatosplenomegaly, no hernia, no mass, normal bowel sounds, no CVA tenderness  Extremities no clubbing cyanosis or edema, calves are symmetric and nontender  Neuro cranial nerves grossly intact, no focal limb deficits  Psych oriented, pleasant affect  Skin no rash, brisk cap refill  Procedures           ED " Course      Results for orders placed or performed during the hospital encounter of 12/11/24   ECG 12 Lead Chest Pain    Collection Time: 12/11/24  7:18 PM   Result Value Ref Range    QT Interval 464 ms    QTC Interval 414 ms   Comprehensive Metabolic Panel    Collection Time: 12/11/24  7:26 PM    Specimen: Blood   Result Value Ref Range    Glucose 82 65 - 99 mg/dL    BUN 25 (H) 8 - 23 mg/dL    Creatinine 0.78 0.76 - 1.27 mg/dL    Sodium 142 136 - 145 mmol/L    Potassium 4.1 3.5 - 5.2 mmol/L    Chloride 105 98 - 107 mmol/L    CO2 28.8 22.0 - 29.0 mmol/L    Calcium 9.8 8.6 - 10.5 mg/dL    Total Protein 6.4 6.0 - 8.5 g/dL    Albumin 4.3 3.5 - 5.2 g/dL    ALT (SGPT) 8 1 - 41 U/L    AST (SGOT) 23 1 - 40 U/L    Alkaline Phosphatase 62 39 - 117 U/L    Total Bilirubin 0.3 0.0 - 1.2 mg/dL    Globulin 2.1 gm/dL    A/G Ratio 2.0 g/dL    BUN/Creatinine Ratio 32.1 (H) 7.0 - 25.0    Anion Gap 8.2 5.0 - 15.0 mmol/L    eGFR 99.0 >60.0 mL/min/1.73   High Sensitivity Troponin T    Collection Time: 12/11/24  7:26 PM    Specimen: Blood   Result Value Ref Range    HS Troponin T 9 <22 ng/L   CBC Auto Differential    Collection Time: 12/11/24  7:26 PM    Specimen: Blood   Result Value Ref Range    WBC 5.78 3.40 - 10.80 10*3/mm3    RBC 4.46 4.14 - 5.80 10*6/mm3    Hemoglobin 13.9 13.0 - 17.7 g/dL    Hematocrit 43.1 37.5 - 51.0 %    MCV 96.6 79.0 - 97.0 fL    MCH 31.2 26.6 - 33.0 pg    MCHC 32.3 31.5 - 35.7 g/dL    RDW 13.2 12.3 - 15.4 %    RDW-SD 47.4 37.0 - 54.0 fl    MPV 11.6 6.0 - 12.0 fL    Platelets 110 (L) 140 - 450 10*3/mm3   Scan Slide    Collection Time: 12/11/24  7:26 PM    Specimen: Blood   Result Value Ref Range    Scan Slide     Manual Differential    Collection Time: 12/11/24  7:26 PM    Specimen: Blood   Result Value Ref Range    Neutrophil % 56.0 42.7 - 76.0 %    Lymphocyte % 27.0 19.6 - 45.3 %    Monocyte % 6.0 5.0 - 12.0 %    Eosinophil % 7.0 (H) 0.3 - 6.2 %    Atypical Lymphocyte % 4.0 0.0 - 5.0 %    Neutrophils  Absolute 3.24 1.70 - 7.00 10*3/mm3    Lymphocytes Absolute 1.79 0.70 - 3.10 10*3/mm3    Monocytes Absolute 0.35 0.10 - 0.90 10*3/mm3    Eosinophils Absolute 0.40 0.00 - 0.40 10*3/mm3    RBC Morphology Normal Normal    WBC Morphology Normal Normal    Platelet Morphology Normal Normal   Green Top (Gel)    Collection Time: 12/11/24  7:26 PM   Result Value Ref Range    Extra Tube Hold for add-ons.    Lavender Top    Collection Time: 12/11/24  7:26 PM   Result Value Ref Range    Extra Tube hold for add-on    Gold Top - SST    Collection Time: 12/11/24  7:26 PM   Result Value Ref Range    Extra Tube Hold for add-ons.    Light Blue Top    Collection Time: 12/11/24  7:26 PM   Result Value Ref Range    Extra Tube Hold for add-ons.    High Sensitivity Troponin T 1Hr    Collection Time: 12/11/24  8:31 PM    Specimen: Blood   Result Value Ref Range    HS Troponin T 8 <22 ng/L    Troponin T Delta -1 Abnormal if >/=3 ng/L     XR Chest 1 View    Result Date: 12/11/2024  Impression: No acute cardiopulmonary disease. Electronically Signed: Cheng Petersen MD  12/11/2024 7:38 PM EST  Workstation ID: [id number]                                                    Medical Decision Making  Differential diagnosis including acute coronary syndrome, pneumonia, pneumothorax, dissection, pulmonary embolus, dysrhythmia    Patient has no signs of DVT, pulmonary embolus felt to be unlikely he is not hypoxic or dyspneic.  He is having no back pain or neurodeficits to suggest dissection.  Patient did take aspirin prior to arrival.  He does have moderate risk heart score.  He was chest pain free throughout the emergency room course.  He does have chronic bradycardia mainly in the 40s.  He states this has been longstanding.  Patient was advised of findings and agreeable plan of hospital observation with plan for cardiology consultation and stress test    Problems Addressed:  Bradycardia: complicated acute illness or injury  Chest pain, unspecified type:  complicated acute illness or injury    Amount and/or Complexity of Data Reviewed  Labs: ordered. Decision-making details documented in ED Course.     Details: High-sensitivity troponin series normal, CBC shows a mild thrombocytopenia, comprehensive metabolic panel essentially normal  Radiology: ordered and independent interpretation performed.     Details: My independent interpretation of chest x-ray image no pneumonia or congestive failure  ECG/medicine tests: ordered and independent interpretation performed.     Details: My EKG interpretation sinus bradycardia baseline artifact, rate of 48    Risk  OTC drugs.  Prescription drug management.  Decision regarding hospitalization.        Final diagnoses:   Bradycardia   Chest pain, unspecified type       ED Disposition  ED Disposition       ED Disposition   Decision to Admit    Condition   --    Comment   --               No follow-up provider specified.       Medication List      No changes were made to your prescriptions during this visit.            Herve Kirk MD  12/11/24 2210       Herve Kirk MD  12/11/24 2210

## 2024-12-12 NOTE — PLAN OF CARE
Problem: Adult Inpatient Plan of Care  Goal: Plan of Care Review  Outcome: Met  Goal: Patient-Specific Goal (Individualized)  Outcome: Met  Goal: Absence of Hospital-Acquired Illness or Injury  Outcome: Met  Intervention: Identify and Manage Fall Risk  Recent Flowsheet Documentation  Taken 12/12/2024 0930 by Nereida White, RN  Safety Promotion/Fall Prevention:   assistive device/personal items within reach   clutter free environment maintained   nonskid shoes/slippers when out of bed   safety round/check completed   room organization consistent  Taken 12/12/2024 0750 by Nereida White RN  Safety Promotion/Fall Prevention: patient off unit  Intervention: Prevent Skin Injury  Recent Flowsheet Documentation  Taken 12/12/2024 0930 by Nereida White RN  Body Position:   position changed independently   sitting up in bed  Intervention: Prevent Infection  Recent Flowsheet Documentation  Taken 12/12/2024 0930 by Nereida White, RN  Infection Prevention:   hand hygiene promoted   rest/sleep promoted   single patient room provided  Goal: Optimal Comfort and Wellbeing  Outcome: Met  Intervention: Provide Person-Centered Care  Recent Flowsheet Documentation  Taken 12/12/2024 0930 by Nereida White RN  Trust Relationship/Rapport:   care explained   questions answered  Goal: Readiness for Transition of Care  Outcome: Met   Goal Outcome Evaluation:   D/c to home

## 2024-12-12 NOTE — DISCHARGE SUMMARY
[facility]    Torsten Simms MD    CHIEF COMPLAINT:     Right-sided chest pain    HISTORY OF PRESENT ILLNESS:    Obtained from admitting physician HPI on 12/11/2024:  64-year-old male describes waking up from sleep this evening at around 530 with some right sided chest pain rating to his right arm.  States it lasted about 15 to 20 minutes and then resolved.  He described as deep pain.  He reports no fevers or chills or diaphoresis or palpitation or shortness of breath or syncope.  He reports no nausea vomiting or abdominal pain.  He reported no relieving or exacerbating factors.  He states he does work third shift.    12/12/24:  Patient confirms the HPI noted above including sudden onset of some right sided chest discomfort described as a burning type pain as well as a muscle soreness which began at approximately 1700 hrs. on the day of presentation with no obvious provoking or palliative factors.  Pain was present for approximately 15 minutes before slowly improving and resolving spontaneously.  He took some aspirin at the onset as well.  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  He does have a history of TOBY and is compliant with his CPAP therapy.  No significant family history of cardiovascular disease is reported.            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    ED (erectile dysfunction)     History of chronic sinusitis     Hx of difficult intubation     X1 only more than 10 years ago    Hypertension     Kidney stone     Obesity (BMI 30-39.9)     Osteoarthritis of left glenohumeral joint 01/07/2021    Added automatically from request for surgery [id number]    Sleep apnea     has CPAP  bring dos    Spinal stenosis     Testicular hypofunction     Thrombocytopenia, chronic     Thyroid activity decreased      supplemented by 25 Again    Ureteral calculus 2022    Vertigo     hx     Past Surgical History:   Procedure Laterality Date    CATARACT EXTRACTION Right     COLON SURGERY      probable trauma related    COLONOSCOPY      KNEE ARTHROSCOPY Bilateral     X3    QUADRICEPS TENDON REPAIR Left     RETINAL DETACHMENT REPAIR Right     SEPTOPLASTY      SHOULDER SURGERY Bilateral     SINUS SURGERY      TOTAL SHOULDER ARTHROPLASTY W/ DISTAL CLAVICLE EXCISION Left 2021    Procedure: TOTAL SHOULDER REVERSE ARTHROPLASTY;  Surgeon: Carl Hairston MD;  Location: [facility] MAIN OR;  Service: Orthopedics;  Laterality: Left;    TOTAL SHOULDER ARTHROPLASTY W/ DISTAL CLAVICLE EXCISION Right 2022    Procedure: TOTAL SHOULDER REVERSE ARTHROPLASTY;  Surgeon: Carl Hairston MD;  Location: [facility] MAIN OR;  Service: Orthopedics;  Laterality: Right;    URETEROSCOPY LASER LITHOTRIPSY WITH STENT INSERTION Left 2022    Procedure: CYSTOSCOPY, LEFT URETEROSCOPY, BILATERAL RETROGRADE PYELOGRAM;  Surgeon: Aung Bolton MD;  Location: [facility] MAIN OR;  Service: Urology;  Laterality: Left;    UVULOPALATOPHARYNGOPLASTY       Family History   Problem Relation Age of Onset    Cancer Mother     Malig Hyperthermia Neg Hx      Social History     Tobacco Use    Smoking status: Former     Current packs/day: 0.00     Average packs/day: 0.5 packs/day for 30.0 years (15.0 ttl pk-yrs)     Types: Cigarettes     Start date: 1985     Quit date: 2015     Years since quittin.9    Smokeless tobacco: Never   Vaping Use    Vaping status: Never Used   Substance Use Topics    Alcohol use: Not Currently     Comment: BEER OCCASIONALLY    Drug use: Never     Medications Prior to Admission   Medication Sig Dispense Refill Last Dose/Taking    Amino Acids (GLUTARADE AMINO ACID BLEND PO) Take  by mouth.   12/10/2024 Evening    Swisher Thyroid 60 MG tablet Take 1 tablet by mouth Daily. 90 tablet 1 2024 at  3:00 PM    DHEA  "25 MG capsule Take 1 capsule by mouth Daily.   12/10/2024    fluticasone (FLONASE) 50 MCG/ACT nasal spray Administer 1 spray into the nostril(s) as directed by provider As Needed.   12/11/2024 at 11:00 AM    meloxicam (MOBIC) 15 MG tablet Take 1 tablet by mouth Daily. 30 tablet 0 12/11/2024 at 11:00 AM    tamsulosin (FLOMAX) 0.4 MG capsule 24 hr capsule Take 1 capsule by mouth Daily.   12/11/2024 at 11:00 AM    vitamin D3 125 MCG (5000 UT) capsule capsule Take 1 capsule by mouth Daily.   12/10/2024 at  7:00 PM    Vitamins-Lipotropics ([facility] Inner Ear Plus) tablet Take 1 tablet by mouth 2 (Two) Times a Day.   12/11/2024 at 11:00 AM    acetaminophen (TYLENOL) 500 MG tablet Take 2 tablets by mouth As Needed for Mild Pain.       ALLERGY SERUM INJECTION Inject 0.16 mL under the skin into the appropriate area as directed 1 (One) Time Per Week. wednesday       cetirizine (zyrTEC) 10 MG tablet Take 1 tablet by mouth Every Morning.   Unknown    Syringe/Needle, Disp, 21G X 1\" 3 ML misc BD ECLIPSE SYRINGE 21G X 1\" 3 ML       Vitamins-Lipotropics (LIPO FLAVONOID PLUS PO)         Allergies:  Ciprofloxacin, Tamsulosin, and Levofloxacin    Immunization History   Administered Date(s) Administered    COVID-19 (Appstarter) 04/06/2021, 07/13/2022    Flu Vaccine Intradermal Quad 18-64YR 10/18/2018    Fluzone (or Fluarix & Flulaval for VFC) >6mos 01/27/2017    Influenza Injectable Mdck Pf Quad 10/18/2018    Pneumococcal Conjugate 20-Valent (PCV20) 10/16/2024           REVIEW OF SYSTEMS:    Review of Systems   Constitutional: Negative.   HENT: Negative.     Eyes: Negative.    Cardiovascular:  Positive for chest pain.   Respiratory: Negative.     Skin: Negative.    Musculoskeletal: Negative.    Gastrointestinal: Negative.    Genitourinary: Negative.    Neurological: Negative.    Psychiatric/Behavioral: Negative.         Vital Signs  Temp:  [97.5 °F (36.4 °C)-98.1 °F (36.7 °C)] 98.1 °F (36.7 °C)  Heart Rate:  [39-48] 43  Resp:  [12-19] 15  BP: " (110-148)/(70-78) 110/70          Physical Exam:  Physical Exam  Vitals reviewed.   Constitutional:       General: He is not in acute distress.     Appearance: Normal appearance. He is normal weight. He is not ill-appearing, toxic-appearing or diaphoretic.   HENT:      Head: Normocephalic.      Right Ear: External ear normal.      Left Ear: External ear normal.      Nose: Nose normal.      Mouth/Throat:      Mouth: Mucous membranes are moist.   Eyes:      Extraocular Movements: Extraocular movements intact.   Cardiovascular:      Rate and Rhythm: Regular rhythm. Bradycardia present.      Pulses: Normal pulses.      Heart sounds: Normal heart sounds.   Pulmonary:      Effort: Pulmonary effort is normal.      Breath sounds: Normal breath sounds.   Abdominal:      General: Bowel sounds are normal.      Palpations: Abdomen is soft.      Tenderness: There is no abdominal tenderness.   Musculoskeletal:         General: Normal range of motion.      Cervical back: Normal range of motion.      Right lower leg: No edema.      Left lower leg: No edema.   Skin:     General: Skin is warm and dry.      Capillary Refill: Capillary refill takes less than 2 seconds.   Neurological:      General: No focal deficit present.      Mental Status: He is alert and oriented to person, place, and time.   Psychiatric:         Mood and Affect: Mood normal.         Behavior: Behavior normal.         Thought Content: Thought content normal.         Judgment: Judgment normal.         Emotional Behavior:   Normal   Debilities:  None  Results Review:    I reviewed the patient's new clinical results.  Lab Results (most recent)       Procedure Component Value Units Date/Time    TSH [[id number]]  (Normal) Collected: 12/12/24 0506    Specimen: Blood Updated: 12/12/24 0713     TSH 2.350 uIU/mL     T4, Free [[id number]]  (Normal) Collected: 12/12/24 0506    Specimen: Blood Updated: 12/12/24 0713     Free T4 1.04 ng/dL     Basic Metabolic Panel [[id number]]   (Abnormal) Collected: 12/12/24 0506    Specimen: Blood Updated: 12/12/24 0553     Glucose 81 mg/dL      BUN 22 mg/dL      Creatinine 0.79 mg/dL      Sodium 144 mmol/L      Potassium 4.1 mmol/L      Chloride 109 mmol/L      CO2 26.0 mmol/L      Calcium 9.2 mg/dL      BUN/Creatinine Ratio 27.8     Anion Gap 9.0 mmol/L      eGFR 98.6 mL/min/1.73     Narrative:      GFR Categories in Chronic Kidney Disease (CKD)      GFR Category          GFR (mL/min/1.73)    Interpretation  G1                     90 or greater         Normal or high (1)  G2                      60-89                Mild decrease (1)  G3a                   45-59                Mild to moderate decrease  G3b                   30-44                Moderate to severe decrease  G4                    15-29                Severe decrease  G5                    14 or less           Kidney failure          (1)In the absence of evidence of kidney disease, neither GFR category G1 or G2 fulfill the criteria for CKD.    eGFR calculation 2021 CKD-EPI creatinine equation, which does not include race as a factor    CBC & Differential [[id number]]  (Abnormal) Collected: 12/12/24 0506    Specimen: Blood Updated: 12/12/24 0524    Narrative:      The following orders were created for panel order CBC & Differential.  Procedure                               Abnormality         Status                     ---------                               -----------         ------                     CBC Auto Differential[[id number]]        Abnormal            Final result                 Please view results for these tests on the individual orders.    CBC Auto Differential [[id number]]  (Abnormal) Collected: 12/12/24 0506    Specimen: Blood Updated: 12/12/24 0524     WBC 4.97 10*3/mm3      RBC 4.18 10*6/mm3      Hemoglobin 13.3 g/dL      Hematocrit 40.0 %      MCV 95.7 fL      MCH 31.8 pg      MCHC 33.3 g/dL      RDW 13.2 %      RDW-SD 47.3 fl      MPV 11.9 fL      Platelets 106 10*3/mm3       Neutrophil % 63.0 %      Lymphocyte % 25.2 %      Monocyte % 6.6 %      Eosinophil % 3.8 %      Basophil % 1.0 %      Immature Grans % 0.4 %      Neutrophils, Absolute 3.13 10*3/mm3      Lymphocytes, Absolute 1.25 10*3/mm3      Monocytes, Absolute 0.33 10*3/mm3      Eosinophils, Absolute 0.19 10*3/mm3      Basophils, Absolute 0.05 10*3/mm3      Immature Grans, Absolute 0.02 10*3/mm3      nRBC 0.0 /100 WBC     High Sensitivity Troponin T 1Hr [[id number]]  (Normal) Collected: 12/11/24 2031    Specimen: Blood Updated: 12/11/24 2103     HS Troponin T 8 ng/L      Troponin T Delta -1 ng/L     Narrative:      High Sensitive Troponin T Reference Range:  <14.0 ng/L- Negative Female for AMI  <22.0 ng/L- Negative Male for AMI  >=14 - Abnormal Female indicating possible myocardial injury.  >=22 - Abnormal Male indicating possible myocardial injury.   Clinicians would have to utilize clinical acumen, EKG, Troponin, and serial changes to determine if it is an Acute Myocardial Infarction or myocardial injury due to an underlying chronic condition.         Comprehensive Metabolic Panel [[id number]]  (Abnormal) Collected: 12/11/24 1926    Specimen: Blood Updated: 12/11/24 1956     Glucose 82 mg/dL      BUN 25 mg/dL      Creatinine 0.78 mg/dL      Sodium 142 mmol/L      Potassium 4.1 mmol/L      Chloride 105 mmol/L      CO2 28.8 mmol/L      Calcium 9.8 mg/dL      Total Protein 6.4 g/dL      Albumin 4.3 g/dL      ALT (SGPT) 8 U/L      AST (SGOT) 23 U/L      Alkaline Phosphatase 62 U/L      Total Bilirubin 0.3 mg/dL      Globulin 2.1 gm/dL      A/G Ratio 2.0 g/dL      BUN/Creatinine Ratio 32.1     Anion Gap 8.2 mmol/L      eGFR 99.0 mL/min/1.73     Narrative:      GFR Categories in Chronic Kidney Disease (CKD)      GFR Category          GFR (mL/min/1.73)    Interpretation  G1                     90 or greater         Normal or high (1)  G2                      60-89                Mild decrease (1)  G3a                   45-59                 Mild to moderate decrease  G3b                   30-44                Moderate to severe decrease  G4                    15-29                Severe decrease  G5                    14 or less           Kidney failure          (1)In the absence of evidence of kidney disease, neither GFR category G1 or G2 fulfill the criteria for CKD.    eGFR calculation 2021 CKD-EPI creatinine equation, which does not include race as a factor    High Sensitivity Troponin T [[id number]]  (Normal) Collected: 12/11/24 1926    Specimen: Blood Updated: 12/11/24 1956     HS Troponin T 9 ng/L     Narrative:      High Sensitive Troponin T Reference Range:  <14.0 ng/L- Negative Female for AMI  <22.0 ng/L- Negative Male for AMI  >=14 - Abnormal Female indicating possible myocardial injury.  >=22 - Abnormal Male indicating possible myocardial injury.   Clinicians would have to utilize clinical acumen, EKG, Troponin, and serial changes to determine if it is an Acute Myocardial Infarction or myocardial injury due to an underlying chronic condition.         CBC & Differential [[id number]]  (Abnormal) Collected: 12/11/24 1926    Specimen: Blood Updated: 12/11/24 1947    Narrative:      The following orders were created for panel order CBC & Differential.  Procedure                               Abnormality         Status                     ---------                               -----------         ------                     CBC Auto Differential[[id number]]        Abnormal            Final result               Scan Slide[[id number]]                                       Final result                 Please view results for these tests on the individual orders.    Manual Differential [[id number]]  (Abnormal) Collected: 12/11/24 1926    Specimen: Blood Updated: 12/11/24 1947     Neutrophil % 56.0 %      Lymphocyte % 27.0 %      Monocyte % 6.0 %      Eosinophil % 7.0 %      Atypical Lymphocyte % 4.0 %      Neutrophils Absolute 3.24 10*3/mm3       Lymphocytes Absolute 1.79 10*3/mm3      Monocytes Absolute 0.35 10*3/mm3      Eosinophils Absolute 0.40 10*3/mm3      RBC Morphology Normal     WBC Morphology Normal     Platelet Morphology Normal    CBC Auto Differential [[id number]]  (Abnormal) Collected: 12/11/24 1926    Specimen: Blood Updated: 12/11/24 1947     WBC 5.78 10*3/mm3      RBC 4.46 10*6/mm3      Hemoglobin 13.9 g/dL      Hematocrit 43.1 %      MCV 96.6 fL      MCH 31.2 pg      MCHC 32.3 g/dL      RDW 13.2 %      RDW-SD 47.4 fl      MPV 11.6 fL      Platelets 110 10*3/mm3     Scan Slide [[id number]] Collected: 12/11/24 1926    Specimen: Blood Updated: 12/11/24 1947     Scan Slide --     Comment: See Manual Differential Results       [place] Draw [[id number]] Collected: 12/11/24 1926    Specimen: Blood Updated: 12/11/24 1931    Narrative:      The following orders were created for panel order [place] Draw.  Procedure                               Abnormality         Status                     ---------                               -----------         ------                     Green Top (Gel)[[id number]]                                  Final result               Lavender Top[[id number]]                                     Final result               Gold Top - SST[[id number]]                                   Final result               Light Blue Top[[id number]]                                   Final result                 Please view results for these tests on the individual orders.    Lavender Top [[id number]] Collected: 12/11/24 1926    Specimen: Blood Updated: 12/11/24 1931     Extra Tube hold for add-on     Comment: Auto resulted       Green Top (Gel) [[id number]] Collected: 12/11/24 1926    Specimen: Blood Updated: 12/11/24 1931     Extra Tube Hold for add-ons.     Comment: Auto resulted.       Gold Top - SST [[id number]] Collected: 12/11/24 1926    Specimen: Blood Updated: 12/11/24 1931     Extra Tube Hold for add-ons.     Comment: Auto resulted.        Light Blue Top [[id number]] Collected: 12/11/24 1926    Specimen: Blood Updated: 12/11/24 1931     Extra Tube Hold for add-ons.     Comment: Auto resulted               Imaging Results (Most Recent)       Procedure Component Value Units Date/Time    XR Chest 1 View [[id number]] Collected: 12/11/24 1937     Updated: 12/11/24 1940    Narrative:      XR CHEST 1 VW    Date of Exam: 12/11/2024 7:22 PM EST    Indication: Chest Pain Protocol  Chest Pain Protocol    Comparison: Chest AP dated 8/12/2022    Findings:  The lungs are clear bilaterally. The cardiac and mediastinal silhouettes appear normal. No effusion is seen.      Impression:      Impression:  No acute cardiopulmonary disease.      Electronically Signed: Cheng Petersen MD    12/11/2024 7:38 PM EST    Workstation ID: [id number]          reviewed    ECG/EMG Results (most recent)       Procedure Component Value Units Date/Time    ECG 12 Lead Chest Pain [[id number]] Collected: 12/11/24 1918     Updated: 12/12/24 0734     QT Interval 464 ms      QTC Interval 414 ms     Narrative:      HEART RATE=48  bpm  RR Rzjheivj=1911  ms  OH Lwghjdtt=295  ms  P Horizontal Axis=-17  deg  P Front Axis=55  deg  QRSD Interval=93  ms  QT Gvgswvro=821  ms  FMcU=310  ms  QRS Axis=52  deg  T Wave Axis=64  deg  - ABNORMAL ECG -  Sinus bradycardia  Electronically Signed By: Herve Kirk ([facility]) 2024-12-12 07:33:59  Date and Time of Study:2024-12-11 19:18:44          reviewed            Microbiology Results (last 10 days)       ** No results found for the last 240 hours. **            Assessment & Plan     Chest pain     Chest pain  Lab Results   Component Value Date    TROPONINT 8 12/11/2024    TROPONINT 9 12/11/2024   -TSH: 2.350, free T4: 1.04  -Chest X-ray: No acute cardiopulmonary process  -EKG: Sinus bradycardia at 48 with some baseline artifact but no obvious acute changes with a QTc of 414 ms  -Stress Test showed normal myocardial perfusion imaging without evidence of ischemia  consistent with a low risk study with an EF calculated at 69% noted is low risk for ischemic heart disease  -Cardiology consulted in the ED  -Telemetry  -NPO    Hypothyroidism  -TSH: 2.350, free T4: 1.04  -Continue thyroid replacement    BPH  -Flomax    I discussed the patients findings and my recommendations with patient and nursing staff.     Discharge Diagnosis:      Chest pain      Hospital Course  Patient is a 64 y.o. male presented with chest pain with an HPI noted above.  Serial troponins were assessed and found to be 8, 9 with a TSH of 2.350 and a free T4 of 1.04.  Chest x-ray showed no acute cardiopulmonary process and EKG showed sinus bradycardia at 48 with some baseline artifact.  He was continued on telemetry remaining bradycardic which patient reports is his baseline throughout his admission with no symptoms reported.  His meloxicam was held.  Stress testing was performed on 12/12/2024 which showed no evidence of ischemia and was consistent with a low risk study with an EF calculated at 69%.  Cardiology been consulted in the ED who evaluated patient and recommended 30-day MCOT at discharge and plans for outpatient follow-up.  At this time he is felt to be in good condition for discharge with close follow-up with his PCP as well as cardiology on an outpatient basis.  His full testing/results and plan were discussed with patient along with concerning/alarm symptoms for which to call 911/return to the ED.  Discussed trial of PPI therapy given his chronic NSAID use however patient states that he would prefer to attempt to discontinue meloxicam and evaluate symptoms though he will discuss possible PPI with outpatient provider in the future if needed.  All questions were answered and he verbalizes his understanding and agreement.    Past Medical History:     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    ED (erectile dysfunction)     History of chronic sinusitis     Hx of  difficult intubation     X1 only more than 10 years ago    Hypertension     Kidney stone     Obesity (BMI 30-39.9)     Osteoarthritis of left glenohumeral joint 2021    Added automatically from request for surgery [id number]    Sleep apnea     has CPAP  bring dos    Spinal stenosis     Testicular hypofunction     Thrombocytopenia, chronic     Thyroid activity decreased     supplemented by 25 Again    Ureteral calculus 2022    Vertigo     hx       Past Surgical History:     Past Surgical History:   Procedure Laterality Date    CATARACT EXTRACTION Right     COLON SURGERY      probable trauma related    COLONOSCOPY      KNEE ARTHROSCOPY Bilateral     X3    QUADRICEPS TENDON REPAIR Left     RETINAL DETACHMENT REPAIR Right     SEPTOPLASTY      SHOULDER SURGERY Bilateral     SINUS SURGERY      TOTAL SHOULDER ARTHROPLASTY W/ DISTAL CLAVICLE EXCISION Left 2021    Procedure: TOTAL SHOULDER REVERSE ARTHROPLASTY;  Surgeon: Carl Hairston MD;  Location: [facility];  Service: Orthopedics;  Laterality: Left;    TOTAL SHOULDER ARTHROPLASTY W/ DISTAL CLAVICLE EXCISION Right 2022    Procedure: TOTAL SHOULDER REVERSE ARTHROPLASTY;  Surgeon: Carl Hairston MD;  Location: [facility] OR;  Service: Orthopedics;  Laterality: Right;    URETEROSCOPY LASER LITHOTRIPSY WITH STENT INSERTION Left 2022    Procedure: CYSTOSCOPY, LEFT URETEROSCOPY, BILATERAL RETROGRADE PYELOGRAM;  Surgeon: Aung Bolton MD;  Location: [facility];  Service: Urology;  Laterality: Left;    UVULOPALATOPHARYNGOPLASTY         Social History:   Social History     Socioeconomic History    Marital status:    Tobacco Use    Smoking status: Former     Current packs/day: 0.00     Average packs/day: 0.5 packs/day for 30.0 years (15.0 ttl pk-yrs)     Types: Cigarettes     Start date: 1985     Quit date: 2015     Years since quittin.9    Smokeless tobacco: Never   Vaping Use    Vaping status: Never  "Used   Substance and Sexual Activity    Alcohol use: Not Currently     Comment: BEER OCCASIONALLY    Drug use: Never    Sexual activity: Defer       Procedures Performed         Consults:   Consults       Date and Time Order Name Status Description    12/11/2024 10:34 PM Inpatient Cardiology Consult Completed             Condition on Discharge:     Stable    Discharge Disposition  Home or Self Care    Discharge Medications     Discharge Medications        Continue These Medications        Instructions Start Date   acetaminophen 500 MG tablet  Commonly known as: TYLENOL   1,000 mg, Oral, As Needed      ALLERGY SERUM INJECTION   0.16 mL, Subcutaneous, Weekly, wednesday      Hasty Thyroid 60 MG tablet  Generic drug: Thyroid   60 mg, Oral, Daily      cetirizine 10 MG tablet  Commonly known as: zyrTEC   10 mg, Oral, Every Morning      [facility] Inner Ear Plus tablet   1 tablet, 2 Times Daily      LIPO FLAVONOID PLUS PO       DHEA 25 MG capsule   1 capsule, Daily      fluticasone 50 MCG/ACT nasal spray  Commonly known as: FLONASE   1 spray, As Needed      GLUTARADE AMINO ACID BLEND PO   Take  by mouth.      Syringe/Needle (Disp) 21G X 1\" 3 ML misc   BD ECLIPSE SYRINGE 21G X 1\" 3 ML      tamsulosin 0.4 MG capsule 24 hr capsule  Commonly known as: FLOMAX   1 capsule, Daily      vitamin D3 125 MCG (5000 UT) capsule capsule   5,000 Units, Daily             Stop These Medications      meloxicam 15 MG tablet  Commonly known as: MOBIC              Discharge Diet:     Activity at Discharge:     Follow-up Appointments  No future appointments.    Additional Instructions for the Follow-ups that You Need to Schedule       Discharge Follow-up with PCP   As directed       Currently Documented PCP:    Torsten Simms MD    PCP Phone Number:    [phone]     Follow Up Details: 5 to 7 days        Discharge Follow-up with Specified Provider: Cardiology; 6 Weeks   As directed      To: Cardiology   Follow Up: 6 Weeks          "       Test Results Pending at Discharge  Pending Results       Procedure [Order ID] Specimen - Date/Time    Cardiac Event Monitor (SILKE) or Mobile Cardiac Outpatient Telemetry (MCT) [[id number]]              Risk for Readmission (LACE) Score: 1 (12/12/2024  6:00 AM)      Greater than 30 minutes spent in discharge activities for this patient    Signature:Electronically signed by Aung Turner PA-C, 12/12/24, 10:06 AM EST.

## 2024-12-12 NOTE — ED NOTES
Patient reports right chest pain radiating to right shoulder and right arm waking him from sleep at approximately 1730. Patient reports he took 2 aspirin, and pain was relieved. Patient denies chest pain at this time.

## 2024-12-13 ENCOUNTER — TELEPHONE (OUTPATIENT)
Dept: CARDIOLOGY | Facility: CLINIC | Age: 64
End: 2024-12-13

## 2024-12-13 NOTE — TELEPHONE ENCOUNTER
Caller: Robin Wheatley    Relationship to patient: Self    Best call back number: [phone]     New or established patient?  [] New  [x] Established    Date of discharge: 12.12.24    Facility discharged from:     Diagnosis/Symptoms: RT SIDED CHEST PAIN    Length of stay (If applicable): 12.11.24-12.12.24 ?     Specialty Only: Did you see a [facility] provider?    [x] Yes  [] No  If so, who? CHRISTOPHER    Additional Details: DR WHITE'S FIRST OPENING IS NOT UNTIL APRIL 2025. PT IS NEEDING 6 WEEK HOSP FU, PLS CALL PT TO SCHEDULE IF POSSIBLE TO FIT HIM IN WITHIN 6 WEEKS.

## 2025-01-12 LAB
CV ZIO BASELINE AVG BPM: 58 BPM
CV ZIO BASELINE BPM HIGH: 194 BPM
CV ZIO BASELINE BPM LOW: 38 BPM
CV ZIO DEVICE ANALYSIS TIME: NORMAL
CV ZIO ECT SVE COUNT: 2131 EPISODES
CV ZIO ECT SVE CPLT COUNT: 162 EPISODES
CV ZIO ECT SVE CPLT FREQ: NORMAL
CV ZIO ECT SVE FREQ: NORMAL
CV ZIO ECT SVE TPLT COUNT: 45 EPISODES
CV ZIO ECT SVE TPLT FREQ: NORMAL
CV ZIO ECT VE COUNT: 345 EPISODES
CV ZIO ECT VE CPLT COUNT: 0 EPISODES
CV ZIO ECT VE CPLT FREQ: 0
CV ZIO ECT VE FREQ: NORMAL
CV ZIO ECT VE TPLT COUNT: 0 EPISODES
CV ZIO ECT VE TPLT FREQ: 0
CV ZIO ECTOPIC SVE COUPLET RAW PERCENT: 0.03 %
CV ZIO ECTOPIC SVE ISOLATED PERCENT: 0.19 %
CV ZIO ECTOPIC SVE TRIPLET RAW PERCENT: 0.01 %
CV ZIO ECTOPIC VE COUPLET RAW PERCENT: 0 %
CV ZIO ECTOPIC VE ISOLATED PERCENT: 0.03 %
CV ZIO ECTOPIC VE TRIPLET RAW PERCENT: 0 %
CV ZIO ENROLLMENT END: NORMAL
CV ZIO ENROLLMENT START: NORMAL
CV ZIO PATIENT EVENTS DIARIES: 0
CV ZIO PATIENT EVENTS TRIGGERS: 2
CV ZIO PAUSE COUNT: 0
CV ZIO PRESCRIPTION STATUS: NORMAL
CV ZIO SVT AVG BPM: 110 BPM
CV ZIO SVT BPM HIGH: 194 BPM
CV ZIO SVT BPM LOW: 85 BPM
CV ZIO SVT COUNT: 22
CV ZIO SVT F EPI AVG BPM: 184 BPM
CV ZIO SVT F EPI BEATS: 5 BEATS
CV ZIO SVT F EPI BPM HIGH: 194 BPM
CV ZIO SVT F EPI BPM LOW: 169 BPM
CV ZIO SVT F EPI DUR: 1.7 SEC
CV ZIO SVT F EPI END: NORMAL
CV ZIO SVT F EPI START: NORMAL
CV ZIO SVT L EPI AVG BPM: 103 BPM
CV ZIO SVT L EPI BEATS: 9 BEATS
CV ZIO SVT L EPI BPM HIGH: 114 BPM
CV ZIO SVT L EPI BPM LOW: 90 BPM
CV ZIO SVT L EPI DUR: 5.7 SEC
CV ZIO SVT L EPI END: NORMAL
CV ZIO SVT L EPI START: NORMAL
CV ZIO TOTAL  ENROLLMENT PERIOD: NORMAL
CV ZIO VT COUNT: 0

## 2025-01-13 ENCOUNTER — TELEPHONE (OUTPATIENT)
Dept: CARDIOLOGY | Facility: CLINIC | Age: 65
End: 2025-01-13

## 2025-01-13 NOTE — TELEPHONE ENCOUNTER
Caller: Robin Wheatley    Relationship: Self    Best call back number:[phone]     What is the best time to reach you: ANY    Who are you requesting to speak with (clinical staff, provider,  specific staff member): CLINICAL        What was the call regarding: PATIENT HAS AN APPOINTMENT TOMORROW 01.14.25 WITH ALESSANDRO SHORE @3:30 PM AND WANTS TO KNOW IF HIS RESULTS FOR HOLTER MONITOR HAS BEEN RECEIVED. PATIENT WOULD LIKE A CALL BACK TO CONFIRM THAT THE HOLTER MONITOR RESULTS HAVE BEEN RECEIVED AND IF THEY HAVE NOT IF HE NEEDS TO KEEP THIS APPOINTMENT OR IF IT NEEDS TO BE RESCHEDULED. THANK YOU.

## 2025-01-14 ENCOUNTER — OFFICE VISIT (OUTPATIENT)
Dept: CARDIOLOGY | Facility: CLINIC | Age: 65
End: 2025-01-14
Payer: COMMERCIAL

## 2025-01-14 VITALS
WEIGHT: 215 LBS | HEART RATE: 63 BPM | HEIGHT: 75 IN | OXYGEN SATURATION: 98 % | RESPIRATION RATE: 16 BRPM | DIASTOLIC BLOOD PRESSURE: 90 MMHG | BODY MASS INDEX: 26.73 KG/M2 | SYSTOLIC BLOOD PRESSURE: 154 MMHG

## 2025-01-14 DIAGNOSIS — R07.9 CHEST PAIN, UNSPECIFIED TYPE: Primary | ICD-10-CM

## 2025-01-14 DIAGNOSIS — R00.1 BRADYCARDIA: ICD-10-CM

## 2025-01-14 DIAGNOSIS — I10 BENIGN ESSENTIAL HYPERTENSION: Chronic | ICD-10-CM

## 2025-01-14 NOTE — PROGRESS NOTES
"Cardiology Office Follow Up Visit      Primary Care Provider:  Torsten Simms MD    Reason for f/u:     Hospital follow-up      Subjective     CC:    Patient had visit to [facility] ED on 12/11/2024 with pain in right chest, shoulder and arm.    History of Present Illness       Robin Wheatley is a 64 y.o. male with no previous cardiac history or known coronary artery disease.  He was awakened from sleep with right-sided chest burning sensation which went into the right shoulder and down the right arm.  He states this lasted 15 minutes before slowly improving and spontaneously completely resolving.  He states he does have reverse shoulder replacements.  Other history includes arthritis, sleep apnea with CPAP, vertigo, and chronic sinusitis.    On admission in the ED, patient's EKG was negative for acute ST/T wave segment abnormalities.  He was bradycardic, which is a chronic issue for him.  He is asymptomatic with his bradycardia.  He had stress Myoview, exercising on the treadmill for 9 minutes with maximum heart rate 129 bpm.  Myoview showed no reversible ischemia.  Patient was discharged with M cot.  Predominantly sinus rhythm with average heart rate 58.  He did have brief SVT.  Bradycardia was noted with minimum heart rate 38 no significant pauses or heart blocks noted.  Less than 1% PAC burden.     Patient presents today with no complaints.  He states he has had no further episodes of the pain which presented him to the ED.  He states at this time he feels like the source of the pain was from his shoulder.  Patient states that he is very active, works full-time and takes care of his horses at home, throwing hay leonard.  He does this without any chest pain, shortness of breath or early fatigue.  Blood pressure in office today mildly elevated, however patient states that he was rushing to get here after work and is \"amped up\".  He states at home systolic blood pressure typically in the 130s.  Heart rate today " 63.    ASSESSMENT/PLAN:      Diagnoses and all orders for this visit:    1. Chest pain, unspecified type (Primary)    2. Benign essential hypertension    3. Bradycardia      MEDICAL DECISION MAKING:    Patient had stress Myoview which was negative for reversible ischemia.  No further episodes of chest pain, shortness of breath, or other concerning for ACS.  Blood pressure mildly elevated in office today.  Home blood pressures not reflective of today's measurement.  Continue to monitor home blood pressures and notify office for SBP greater than 140.  Patient has had bradycardia chronically with no symptoms.  Today blood pressure 63.  He reports he has had no weakness, dizziness or loss of balance.    Follow-up with Dr. Cruz in 1 year, sooner for any new symptoms or concerns.      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    ED (erectile dysfunction)     History of chronic sinusitis     Hx of difficult intubation     X1 only more than 10 years ago    Hypertension     Kidney stone     Obesity (BMI 30-39.9)     Osteoarthritis of left glenohumeral joint 01/07/2021    Added automatically from request for surgery [id number]    Sleep apnea     has CPAP  bring dos    Spinal stenosis     Testicular hypofunction     Thrombocytopenia, chronic     Thyroid activity decreased     supplemented by 25 Again    Ureteral calculus 01/14/2022    Vertigo     hx       Past Surgical History:   Procedure Laterality Date    CATARACT EXTRACTION Right     COLON SURGERY      probable trauma related    COLONOSCOPY      KNEE ARTHROSCOPY Bilateral     X3    QUADRICEPS TENDON REPAIR Left     RETINAL DETACHMENT REPAIR Right     SEPTOPLASTY      SHOULDER SURGERY Bilateral     SINUS SURGERY      TOTAL SHOULDER ARTHROPLASTY W/ DISTAL CLAVICLE EXCISION Left 01/19/2021    Procedure: TOTAL SHOULDER REVERSE ARTHROPLASTY;  Surgeon: Carl Hairston MD;  [facility];  Service: Orthopedics;  Laterality:  "Left;    TOTAL SHOULDER ARTHROPLASTY W/ DISTAL CLAVICLE EXCISION Right 8/24/2022    Procedure: TOTAL SHOULDER REVERSE ARTHROPLASTY;  Surgeon: Carl Hairston MD;  Location: [facility] MAIN OR;  Service: Orthopedics;  Laterality: Right;    URETEROSCOPY LASER LITHOTRIPSY WITH STENT INSERTION Left 01/14/2022    Procedure: CYSTOSCOPY, LEFT URETEROSCOPY, BILATERAL RETROGRADE PYELOGRAM;  Surgeon: Aung Bolton MD;  Location: [facility] MAIN OR;  Service: Urology;  Laterality: Left;    UVULOPALATOPHARYNGOPLASTY           Current Outpatient Medications:     acetaminophen (TYLENOL) 500 MG tablet, Take 2 tablets by mouth As Needed for Mild Pain., Disp: , Rfl:     ALLERGY SERUM INJECTION, Inject 0.16 mL under the skin into the appropriate area as directed 1 (One) Time Per Week. wednesday, Disp: , Rfl:     Amino Acids (GLUTARADE AMINO ACID BLEND PO), Take  by mouth., Disp: , Rfl:     Ismay Thyroid 60 MG tablet, Take 1 tablet by mouth Daily., Disp: 90 tablet, Rfl: 1    cetirizine (zyrTEC) 10 MG tablet, Take 1 tablet by mouth Every Morning., Disp: , Rfl:     DHEA 25 MG capsule, Take 1 capsule by mouth Daily., Disp: , Rfl:     fluticasone (FLONASE) 50 MCG/ACT nasal spray, Administer 1 spray into the nostril(s) as directed by provider As Needed., Disp: , Rfl:     Syringe/Needle, Disp, 21G X 1\" 3 ML misc, BD ECLIPSE SYRINGE 21G X 1\" 3 ML, Disp: , Rfl:     tamsulosin (FLOMAX) 0.4 MG capsule 24 hr capsule, Take 1 capsule by mouth Daily., Disp: , Rfl:     vitamin D3 125 MCG (5000 UT) capsule capsule, Take 1 capsule by mouth Daily., Disp: , Rfl:     Vitamins-Lipotropics ([employer] Inner Ear Plus) tablet, Take 1 tablet by mouth 2 (Two) Times a Day., Disp: , Rfl:     Vitamins-Lipotropics (LIPO FLAVONOID PLUS PO), , Disp: , Rfl:     Social History     Socioeconomic History    Marital status:    Tobacco Use    Smoking status: Former     Current packs/day: 0.00     Average packs/day: 0.5 packs/day for 30.0 years (15.0 ttl pk-yrs) " "    Types: Cigarettes     Start date: 1/1/1985     Quit date: 1/1/2015     Years since quitting: 10.0    Smokeless tobacco: Never   Vaping Use    Vaping status: Never Used   Substance and Sexual Activity    Alcohol use: Not Currently     Comment: BEER OCCASIONALLY    Drug use: Never    Sexual activity: Defer       Family History   Problem Relation Age of Onset    Cancer Mother     [place] Hyperthermia Neg Hx        The following portions of the patient's history were reviewed and updated as appropriate: allergies, current medications, past family history, past medical history, past social history, past surgical history and problem list.    Review of Systems   Constitutional: Negative for diaphoresis, malaise/fatigue, weight gain and weight loss.   Eyes:  Negative for visual disturbance.   Cardiovascular:  Negative for chest pain, claudication, cyanosis, dyspnea on exertion, irregular heartbeat, leg swelling, near-syncope, orthopnea, palpitations, paroxysmal nocturnal dyspnea and syncope.   Respiratory:  Negative for cough, hemoptysis, shortness of breath, sleep disturbances due to breathing and snoring.    Endocrine: Negative for polyuria.   Hematologic/Lymphatic: Negative for bleeding problem. Does not bruise/bleed easily.   Skin:  Negative for color change.   Musculoskeletal:  Negative for joint swelling, muscle weakness and myalgias.   Gastrointestinal:  Negative for abdominal pain, nausea and vomiting.   Genitourinary:  Negative for dysuria.   Neurological:  Negative for dizziness, focal weakness, headaches, light-headedness, loss of balance, numbness, paresthesias, vertigo and weakness.   Psychiatric/Behavioral:  Negative for altered mental status.    All other systems reviewed and are negative.      Pertinent items are noted in HPI, all other systems reviewed and negative    /90 (BP Location: Right arm, Patient Position: Sitting, Cuff Size: Large Adult)   Pulse 63   Resp 16   Ht 190 cm (74.8\")   Wt " 97.5 kg (215 lb)   SpO2 98%   BMI 27.01 kg/m² .  Objective     Vitals reviewed.   Constitutional:       Appearance: Normal appearance. Not in distress. Not diaphoretic.   Eyes:      Extraocular Movements: Extraocular movements intact.      Conjunctiva/sclera: Conjunctivae normal.      Pupils: Pupils are equal, round, and reactive to light.   HENT:      Head: Normocephalic and atraumatic.   Neck:      Vascular: No JVD.   Pulmonary:      Effort: Pulmonary effort is normal. No tachypnea or accessory muscle usage.      Breath sounds: Normal breath sounds. No stridor.   Cardiovascular:      PMI at left midclavicular line. Normal rate. Regular rhythm. Normal S1. Normal S2.       Murmurs: There is no murmur.      No gallop.  No click. No rub.   Pulses:     Intact distal pulses.   Edema:     Peripheral edema absent.   Abdominal:      General: There is no distension.      Palpations: Abdomen is soft.      Tenderness: There is no abdominal tenderness.   Skin:     General: Skin is warm and dry.      Coloration: Skin is not cyanotic.      Findings: No erythema.   Neurological:      General: No focal deficit present.      Mental Status: Alert, oriented to person, place, and time and oriented to person, place and time.   Psychiatric:         Mood and Affect: Mood and affect normal.         Behavior: Behavior normal.         Cognition and Memory: Cognition normal.         Judgment: Judgment normal.           Procedures      Robin Wheatley  reports that he quit smoking about 10 years ago. His smoking use included cigarettes. He started smoking about 40 years ago. He has a 15 pack-year smoking history. He has never used smokeless tobacco.

## 2025-03-10 ENCOUNTER — HOSPITAL ENCOUNTER (EMERGENCY)
Facility: HOSPITAL | Age: 65
Discharge: HOME | End: 2025-03-10
Attending: EMERGENCY MEDICINE | Admitting: EMERGENCY MEDICINE
Payer: COMMERCIAL

## 2025-03-10 ENCOUNTER — APPOINTMENT (OUTPATIENT)
Dept: ULTRASOUND IMAGING | Facility: HOSPITAL | Age: 65
End: 2025-03-10
Payer: COMMERCIAL

## 2025-03-10 VITALS
TEMPERATURE: 98.8 F | RESPIRATION RATE: 18 BRPM | HEIGHT: 75 IN | HEART RATE: 54 BPM | BODY MASS INDEX: 27.58 KG/M2 | SYSTOLIC BLOOD PRESSURE: 112 MMHG | WEIGHT: 221.8 LBS | DIASTOLIC BLOOD PRESSURE: 72 MMHG | OXYGEN SATURATION: 99 %

## 2025-03-10 DIAGNOSIS — M79.89 RIGHT LEG SWELLING: Primary | ICD-10-CM

## 2025-03-10 DIAGNOSIS — M66.0 RUPTURE OF POPLITEAL CYST: ICD-10-CM

## 2025-03-10 LAB
ANION GAP SERPL CALCULATED.3IONS-SCNC: 7.8 MMOL/L (ref 5–15)
BASOPHILS # BLD AUTO: 0.02 10*3/MM3 (ref 0–0.2)
BASOPHILS NFR BLD AUTO: 0.3 % (ref 0–1.5)
BUN SERPL-MCNC: 33 MG/DL (ref 8–23)
BUN/CREAT SERPL: 43.4 (ref 7–25)
CALCIUM SPEC-SCNC: 9.2 MG/DL (ref 8.6–10.5)
CHLORIDE SERPL-SCNC: 106 MMOL/L (ref 98–107)
CO2 SERPL-SCNC: 25.2 MMOL/L (ref 22–29)
CREAT SERPL-MCNC: 0.76 MG/DL (ref 0.76–1.27)
DEPRECATED RDW RBC AUTO: 46.4 FL (ref 37–54)
EGFRCR SERPLBLD CKD-EPI 2021: 99.7 ML/MIN/1.73
EOSINOPHIL # BLD AUTO: 0.13 10*3/MM3 (ref 0–0.4)
EOSINOPHIL NFR BLD AUTO: 2.1 % (ref 0.3–6.2)
ERYTHROCYTE [DISTWIDTH] IN BLOOD BY AUTOMATED COUNT: 12.7 % (ref 12.3–15.4)
GLUCOSE SERPL-MCNC: 97 MG/DL (ref 65–99)
HCT VFR BLD AUTO: 41.4 % (ref 37.5–51)
HGB BLD-MCNC: 13.1 G/DL (ref 13–17.7)
IMM GRANULOCYTES # BLD AUTO: 0.01 10*3/MM3 (ref 0–0.05)
IMM GRANULOCYTES NFR BLD AUTO: 0.2 % (ref 0–0.5)
LYMPHOCYTES # BLD AUTO: 0.93 10*3/MM3 (ref 0.7–3.1)
LYMPHOCYTES NFR BLD AUTO: 14.8 % (ref 19.6–45.3)
MCH RBC QN AUTO: 31 PG (ref 26.6–33)
MCHC RBC AUTO-ENTMCNC: 31.6 G/DL (ref 31.5–35.7)
MCV RBC AUTO: 98.1 FL (ref 79–97)
MONOCYTES # BLD AUTO: 0.33 10*3/MM3 (ref 0.1–0.9)
MONOCYTES NFR BLD AUTO: 5.2 % (ref 5–12)
NEUTROPHILS NFR BLD AUTO: 4.88 10*3/MM3 (ref 1.7–7)
NEUTROPHILS NFR BLD AUTO: 77.4 % (ref 42.7–76)
PLATELET # BLD AUTO: 96 10*3/MM3 (ref 140–450)
PMV BLD AUTO: 12.3 FL (ref 6–12)
POTASSIUM SERPL-SCNC: 3.9 MMOL/L (ref 3.5–5.2)
RBC # BLD AUTO: 4.22 10*6/MM3 (ref 4.14–5.8)
SODIUM SERPL-SCNC: 139 MMOL/L (ref 136–145)
WBC NRBC COR # BLD AUTO: 6.3 10*3/MM3 (ref 3.4–10.8)

## 2025-03-10 PROCEDURE — 99284 EMERGENCY DEPT VISIT MOD MDM: CPT

## 2025-03-10 PROCEDURE — 85025 COMPLETE CBC W/AUTO DIFF WBC: CPT | Performed by: EMERGENCY MEDICINE

## 2025-03-10 PROCEDURE — 93971 EXTREMITY STUDY: CPT

## 2025-03-10 PROCEDURE — 99283 EMERGENCY DEPT VISIT LOW MDM: CPT | Performed by: EMERGENCY MEDICINE

## 2025-03-10 PROCEDURE — 80048 BASIC METABOLIC PNL TOTAL CA: CPT | Performed by: EMERGENCY MEDICINE

## 2025-03-10 NOTE — FSED PROVIDER NOTE
"Subjective   History of Present Illness  Patient presents concerning of right sided lower leg swelling for the last 3 days.  Said he felt good on it yesterday.  Denies any injury to the region.  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    ED (erectile dysfunction)     History of chronic sinusitis     Hx of difficult intubation     X1 only more than 10 years ago    Hypertension     Kidney stone     Obesity (BMI 30-39.9)     Osteoarthritis of left glenohumeral joint 01/07/2021    Added automatically from request for surgery [id number]    Sleep apnea     has CPAP  bring dos    Spinal stenosis     Testicular hypofunction     Thrombocytopenia, chronic     Thyroid activity decreased     supplemented by 25 Again    Ureteral calculus 01/14/2022    Vertigo     hx       Allergies   Allergen Reactions    Ciprofloxacin Nausea Only    Tamsulosin Nausea Only and GI Intolerance     \"BLOATING\"    Levofloxacin GI Intolerance       Past Surgical History:   Procedure Laterality Date    CATARACT EXTRACTION Right     COLON SURGERY      probable trauma related    COLONOSCOPY      KNEE ARTHROSCOPY Bilateral     X3    QUADRICEPS TENDON REPAIR Left     RETINAL DETACHMENT REPAIR Right     SEPTOPLASTY      SHOULDER SURGERY Bilateral     SINUS SURGERY      TOTAL SHOULDER ARTHROPLASTY W/ DISTAL CLAVICLE EXCISION Left 01/19/2021    Procedure: TOTAL SHOULDER REVERSE ARTHROPLASTY;  Surgeon: Carl Hairston MD;  Location: [facility] OR;  Service: Orthopedics;  Laterality: Left;    TOTAL SHOULDER ARTHROPLASTY W/ DISTAL CLAVICLE EXCISION Right 8/24/2022    Procedure: TOTAL SHOULDER REVERSE ARTHROPLASTY;  Surgeon: Carl Hairston MD;  Location: [facility] OR;  Service: Orthopedics;  Laterality: Right;    URETEROSCOPY LASER LITHOTRIPSY WITH STENT INSERTION Left 01/14/2022    Procedure: CYSTOSCOPY, LEFT URETEROSCOPY, BILATERAL RETROGRADE PYELOGRAM;  " Surgeon: Aung Bolton MD;  Location: [facility] OR;  Service: Urology;  Laterality: Left;    UVULOPALATOPHARYNGOPLASTY         Family History   Problem Relation Age of Onset    Cancer Mother     [place] Hyperthermia Neg Hx        Social History     Socioeconomic History    Marital status:    Tobacco Use    Smoking status: Former     Current packs/day: 0.00     Average packs/day: 0.5 packs/day for 30.0 years (15.0 ttl pk-yrs)     Types: Cigarettes     Start date: 1/1/1985     Quit date: 1/1/2015     Years since quitting: 10.1    Smokeless tobacco: Never   Vaping Use    Vaping status: Never Used   Substance and Sexual Activity    Alcohol use: Not Currently     Comment: BEER OCCASIONALLY    Drug use: Never    Sexual activity: Defer           Objective   Physical Exam  EXTREMITIES: Right leg is mildly swollen compared to the left from the knee down, there is no erythema or warmth, it is tender to palpation of the proximal calf and lower popliteal fossa, no swelling in the popliteal fossa.  Neurovascular intact distally  Procedures           ED Course  ED Course as of 03/10/25 1854   Mon Mar 10, 2025   1851 IMPRESSION:  Impression:     1. No evidence of deep venous thrombosis within the right lower extremity.  2. Complex fluid collection within the right popliteal fossa which could represent hematoma or hemorrhagic popliteal cyst.    Patient had no injury to the area so doubt hematoma, more likely hemorrhagic popliteal cyst.  Discussed finding with patient and will have him follow-up with his doctor.  Encouraged him to use ice or heat as needed for comfort as well as take Tylenol. [RO]      ED Course User Index  [RO] James Dwyer MD                                           Medical Decision Making  Differential included ruptured Baker's cyst, DVT, no concern for cellulitis based on exam, no concern for arterial issue.  As his swelling is one-sided no concern for heart failure    Problems  Addressed:  Right leg swelling: complicated acute illness or injury  Rupture of popliteal cyst: complicated acute illness or injury    Amount and/or Complexity of Data Reviewed  Labs: ordered. Decision-making details documented in ED Course.  Radiology: ordered. Decision-making details documented in ED Course.        Final diagnoses:   Right leg swelling   Rupture of popliteal cyst       ED Disposition  ED Disposition       ED Disposition   Discharge    Condition   Good    Comment   --               Torsten Simms MD  [address] [facility] [place] [address] [phone]    Call   To schedule follow-up appointment         Medication List      No changes were made to your prescriptions during this visit.

## 2025-03-10 NOTE — DISCHARGE INSTRUCTIONS
Take Tylenol on a scheduled basis for pain.  Use ice as discussed, elevate the leg to help with swelling.  Follow-up with your doctor.

## 2025-04-09 ENCOUNTER — TELEPHONE (OUTPATIENT)
Dept: ORTHOPEDIC SURGERY | Facility: CLINIC | Age: 65
End: 2025-04-09

## 2025-04-09 NOTE — TELEPHONE ENCOUNTER
Caller: SATINDER MANRIQUE     Relationship: [unfilled]     Best call back number: [phone]*    What is your medical concern? RIGHT KNEE PAIN, RUPTURED PERDOMO CYST    How long has this issue been going on? 03/10/25    Is your provider already aware of this issue? NO    Have you been treated for this issue? NO    PT IS WANTING TO SCHEDULE WITH DR JONES FOR THE RIGHT KNEE.. PT WENT TO THE ER ON 03/10/25 .. PLEASE ADVISE

## 2025-04-16 ENCOUNTER — OFFICE VISIT (OUTPATIENT)
Dept: ORTHOPEDIC SURGERY | Facility: CLINIC | Age: 65
End: 2025-04-16
Payer: COMMERCIAL

## 2025-04-16 VITALS — RESPIRATION RATE: 20 BRPM | HEIGHT: 75 IN | BODY MASS INDEX: 27.48 KG/M2 | OXYGEN SATURATION: 97 % | WEIGHT: 221 LBS

## 2025-04-16 DIAGNOSIS — M66.0 RUPTURE OF POPLITEAL CYST: ICD-10-CM

## 2025-04-16 DIAGNOSIS — M25.561 CHRONIC PAIN OF RIGHT KNEE: Primary | ICD-10-CM

## 2025-04-16 DIAGNOSIS — G89.29 CHRONIC PAIN OF RIGHT KNEE: Primary | ICD-10-CM

## 2025-04-16 RX ORDER — MELOXICAM 15 MG/1
TABLET ORAL
COMMUNITY
Start: 2025-02-23

## 2025-04-16 RX ORDER — TADALAFIL 5 MG/1
TABLET ORAL
COMMUNITY
Start: 2025-04-05

## 2025-04-17 NOTE — PROGRESS NOTES
[facility]        Patient Name: Robin Wheatley  : 1960  Primary Care Physician: Torsten Simms MD        Chief Complaint:    Chief Complaint   Patient presents with    Right Knee - Pain, Initial Evaluation     DOI- around 3/10/2025           HPI:   History of Present Illness  The patient is a 64-year-old male who presents for evaluation of right knee pain.    Intermittent stiffness in the right knee has been experienced for the past 2 weeks. He recalls waking up one morning with severe soreness and swelling in the knee, which worsened by the end of the day, making it difficult to put on his boot. Immediate medical attention was sought at [facility] where an ultrasound revealed a ruptured Baker's cyst. The attending physician advised taking a few days off work to allow the cyst to heal and prevent further bleeding, which could necessitate surgical intervention. Despite this advice, he continued to work, resulting in persistent swelling and occasional tenderness upon touch. Discoloration in the foot and toes was noticed approximately 1.5 weeks post-incident, preventing him from wearing his work boots.     Consultation with his primary care physician occurred 1.5 to 2 weeks ago, who recommended an MRI due to the ongoing swelling. However, the insurance company did not approve the procedure, and his doctor suggested consulting a specialist for the MRI.     Concerns about potential arterial or venous damage are expressed, seeking reassurance that there is no internal bleeding. Swelling is reported to be more pronounced in the evening than in the morning, and tenderness is not constant. A line of soreness on one side of the leg is mentioned. He has a history of poor pain tolerance and has previously experienced more swelling from broken bones than from this current issue.             Past Medical/Surgical, Social and Family History:  I have reviewed and/or updated pertinent history as noted in  the medical record including:  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    ED (erectile dysfunction)     History of chronic sinusitis     Hx of difficult intubation     X1 only more than 10 years ago    Hypertension     Kidney stone     Obesity (BMI 30-39.9)     Osteoarthritis of left glenohumeral joint 01/07/2021    Added automatically from request for surgery [id number]    Sleep apnea     has CPAP  bring dos    Spinal stenosis     Testicular hypofunction     Thrombocytopenia, chronic     Thyroid activity decreased     supplemented by 25 Again    Ureteral calculus 01/14/2022    Vertigo     hx     Past Surgical History:   Procedure Laterality Date    CATARACT EXTRACTION Right     COLON SURGERY      probable trauma related    COLONOSCOPY      KNEE ARTHROSCOPY Bilateral     X3    QUADRICEPS TENDON REPAIR Left     RETINAL DETACHMENT REPAIR Right     SEPTOPLASTY      SHOULDER SURGERY Bilateral     SINUS SURGERY      TOTAL SHOULDER ARTHROPLASTY W/ DISTAL CLAVICLE EXCISION Left 01/19/2021    Procedure: TOTAL SHOULDER REVERSE ARTHROPLASTY;  Surgeon: Carl Hairston MD;  Location: [facility];  Service: Orthopedics;  Laterality: Left;    TOTAL SHOULDER ARTHROPLASTY W/ DISTAL CLAVICLE EXCISION Right 8/24/2022    Procedure: TOTAL SHOULDER REVERSE ARTHROPLASTY;  Surgeon: Carl Hairston MD;  Location: [facility];  Service: Orthopedics;  Laterality: Right;    URETEROSCOPY LASER LITHOTRIPSY WITH STENT INSERTION Left 01/14/2022    Procedure: CYSTOSCOPY, LEFT URETEROSCOPY, BILATERAL RETROGRADE PYELOGRAM;  Surgeon: Aung Bolton MD;  Location: [facility]  Service: Urology;  Laterality: Left;    UVULOPALATOPHARYNGOPLASTY       Social History     Occupational History    Not on file   Tobacco Use    Smoking status: Former     Current packs/day: 0.00     Average packs/day: 0.5 packs/day for 30.0 years (15.0 ttl pk-yrs)     Types: Cigarettes     Start date:  "1/1/1985     Quit date: 1/1/2015     Years since quitting: 10.2    Smokeless tobacco: Never   Vaping Use    Vaping status: Never Used   Substance and Sexual Activity    Alcohol use: Not Currently     Comment: BEER OCCASIONALLY    Drug use: Never    Sexual activity: Defer          Allergies:   Allergies   Allergen Reactions    Ciprofloxacin Nausea Only    Tamsulosin Nausea Only and GI Intolerance     \"BLOATING\"    Levofloxacin GI Intolerance       Medications:   Home Medications:  Current Outpatient Medications on File Prior to Visit   Medication Sig    acetaminophen (TYLENOL) 500 MG tablet Take 2 tablets by mouth As Needed for Mild Pain.    ALLERGY SERUM INJECTION Inject 0.16 mL under the skin into the appropriate area as directed 1 (One) Time Per Week. wednesday    Amino Acids (GLUTARADE AMINO ACID BLEND PO) Take  by mouth.    [place] Thyroid 60 MG tablet Take 1 tablet by mouth Daily.    cetirizine (zyrTEC) 10 MG tablet Take 1 tablet by mouth Every Morning.    DHEA 25 MG capsule Take 1 capsule by mouth Daily.    fluticasone (FLONASE) 50 MCG/ACT nasal spray Administer 1 spray into the nostril(s) as directed by provider As Needed.    meloxicam (MOBIC) 15 MG tablet     Syringe/Needle, Disp, 21G X 1\" 3 ML misc BD ECLIPSE SYRINGE 21G X 1\" 3 ML    tadalafil (CIALIS) 5 MG tablet TAKE 1 TABLET BY MOUTH ONCE DAILY FOR INCONTINENCE    tamsulosin (FLOMAX) 0.4 MG capsule 24 hr capsule Take 1 capsule by mouth Daily.    vitamin D3 125 MCG (5000 UT) capsule capsule Take 1 capsule by mouth Daily.    Vitamins-Lipotropics ([employer] Inner Ear Plus) tablet Take 1 tablet by mouth 2 (Two) Times a Day.    Vitamins-Lipotropics (LIPO FLAVONOID PLUS PO)      No current facility-administered medications on file prior to visit.         ROS:  Negative unless listed in the HPI    Physical Exam:   64 y.o. male  Body mass index is 27.62 kg/m²., 100 kg (221 lb)  Vitals:    04/16/25 1429   Resp: 20   SpO2: 97%     General: Alert, cooperative, appears " well and in no observable distress.   HEENT: Normocephalic, atraumatic on external visual inspection. No icterus.   CV: No significant peripheral edema.    Respiratory: Normal respiratory effort.   Skin: Warm & well perfused; appropriate skin turgor.  Psych: Appropriate mood & affect.  Neuro: Gross sensation and motor intact in affected extremity/extremities.  Vascular: Peripheral pulses palpable in affected extremity/extremities.     Ortho Exam   Right knee  No deformity, no ecchymosis, no warmth or erythema   Edema noted to the RLE from the knee to the foot  Non pitting  Palpable pedal pulses  Brisk CR  No calf tenderness  Neg homans     Radiology:  US VENOUS DOPPLER LOWER EXTREMITY RIGHT (DUPLEX)     Date of Exam: 3/10/2025 5:45 PM EDT     Indication: right leg swelling.     Comparison: None available.     Technique:  Routine gray scale, color flow and spectral Doppler analysis of the right lower extremity. A complete venous study was performed with image documentation obtained per protocol.        Findings:  Visualized portions of the deep veins are fully compressible. There are normal color and spectral waveform within the veins. Within the right popliteal fossa there is a 10.8 x 2.4 cm mildly complex fluid collection which could represent hematoma or   hemorrhagic popliteal cyst.     IMPRESSION:  Impression:     1. No evidence of deep venous thrombosis within the right lower extremity.  2. Complex fluid collection within the right popliteal fossa which could represent hematoma or hemorrhagic popliteal cyst.           Electronically Signed: Aroldo Fabian MD    3/10/2025 6:34 PM EDT    Workstation ID: [id number]    Assessment:  Assessment & Plan  1. Right ruptured Baker's cyst:  Ultrasound confirmed a 10.8 cm fluid area. Significant pain, swelling, and bruising reported, with improvement but persistent symptoms. Examination revealed continued swelling and tightness, no bruising or black and blue discoloration.  Strong pulses noted in the affected area.    Recovery might take 8-12 weeks.  Monitor symptoms and avoid strenuous activities that could exacerbate the condition. If symptoms do not improve within 4 to 6 weeks, a steroid injection into the knee may be considered.    Body mass index is 27.62 kg/m².  BMI consistent with Overweight: 25.0-29.9kg/m2   BMI is >= 25 and <30. (Overweight) The following options were offered after discussion;: information on healthy weight added to patient's after visit summary       Patient encouraged to call with any questions or concerns in the interim    MIHAELA Mireles     Patient or patient representative verbalized consent for the use of Ambient Listening during the visit with  MIHAELA Mireles for chart documentation. 4/17/2025  08:21 EDT

## 2025-06-30 ENCOUNTER — TELEPHONE (OUTPATIENT)
Dept: ORTHOPEDIC SURGERY | Facility: CLINIC | Age: 65
End: 2025-06-30

## 2025-06-30 NOTE — TELEPHONE ENCOUNTER
Caller: Robin Wheatley    Relationship: Self    Best call back number: [phone]    What is the best time to reach you: ANY     Who are you requesting to speak with (clinical staff, provider,  specific staff member): PROVIDER,CLINICAL STAFF     Do you know the name of the person who called: YES     What was the call regarding: PATIENT STATES THE RIGHT KNEE IS SWOLLEN AND TIGHT ONSET 5 DAYS AGO-PAIN OCCURS WHEN CLIMBING STAIRS OR LADDERS ALONG WITH MANUAL LABOR- PATIENT STATES WHEN HE GETS TIME HE APPLIES ICE FOR SWELLING - PATIENT WANTED TO TRY TO GET EITHER IMAGING ORDERED LIKE AN MRI OR AN  ASAP APPT FOR AFTER 3 DUE TO WORK SCHEDULE- OK TO LEAVE A DETAILED VOICEMAIL IF NO ANSWER

## 2025-07-02 ENCOUNTER — OFFICE VISIT (OUTPATIENT)
Dept: ORTHOPEDIC SURGERY | Facility: CLINIC | Age: 65
End: 2025-07-02
Payer: COMMERCIAL

## 2025-07-02 VITALS — OXYGEN SATURATION: 96 % | WEIGHT: 221 LBS | HEART RATE: 64 BPM | BODY MASS INDEX: 27.48 KG/M2 | HEIGHT: 75 IN

## 2025-07-02 DIAGNOSIS — G89.29 CHRONIC PAIN OF RIGHT KNEE: Primary | ICD-10-CM

## 2025-07-02 DIAGNOSIS — M25.561 CHRONIC PAIN OF RIGHT KNEE: Primary | ICD-10-CM

## 2025-07-07 NOTE — PROGRESS NOTES
FOLLOW UP VISIT        Patient Name: Robin Wheatley  : 1960  Primary Care Physician: Torsten Simms MD        Chief Complaint:  right knee pain    HPI:   History of Present Illness  The patient presents for follow up evaluation of knee pain.    He has been experiencing persistent knee issues since the development of a Baker's cyst and subsequent rupture. Although the swelling has reduced, it remains present. He reports that his knee was causing severe pain, to the point where he was unable to sleep for 2 to 3 nights. The pain was so intense that it felt as if someone was twisting his knee. He also experiences difficulty when climbing stairs or ladders, and when putting weight on the ball of his foot. The pain is localized behind the knee, but it is not as tight as before. He describes the current pain level as 1 or 2 out of 10, but notes that it escalates quickly with activity. He reports no popping or grinding sensations in his knee, similar to those he experienced in his shoulders. He also reports no instability in his knee. He recalls a popping sensation when he was kneeling, which did not cause pain but seemed to worsen his condition.     He experiences pain with or without swelling, and finds it difficult to lift his leg. He continues to wear socks for support. He is concerned about the persistent swelling in his knee, which seems to worsen with heat. He did not experience any leg swelling prior to the Baker's cyst. He resumed work 2 days after the incident and wonders if he should have taken more time off. He saw his primary care physician 2 or 3 days after the incident, but his insurance did not approve an MRI. He has been managing the pain with ice and Deep Blue Horse ointment, which has provided some relief.            Past Medical/Surgical, Social and Family History:  I have reviewed and/or updated pertinent history as noted in the medical record including:  Past Medical History:    Diagnosis Date    Allergic     DJD of shoulder 01/07/2021    ED (erectile dysfunction)     History of chronic sinusitis     Hx of difficult intubation     X1 only more than 10 years ago    Hypertension     Kidney stone     Obesity (BMI 30-39.9)     Osteoarthritis of left glenohumeral joint 01/07/2021    Added automatically from request for surgery [id number]    Sleep apnea     has CPAP  bring dos    Spinal stenosis     Testicular hypofunction     Thrombocytopenia, chronic     Thyroid activity decreased     supplemented by 25 Again    Ureteral calculus 01/14/2022    Vertigo     hx     Past Surgical History:   Procedure Laterality Date    CATARACT EXTRACTION Right     COLON SURGERY      probable trauma related    COLONOSCOPY      KNEE ARTHROSCOPY Bilateral     X3    QUADRICEPS TENDON REPAIR Left     RETINAL DETACHMENT REPAIR Right     SEPTOPLASTY      SHOULDER SURGERY Bilateral     SINUS SURGERY      TOTAL SHOULDER ARTHROPLASTY W/ DISTAL CLAVICLE EXCISION Left 01/19/2021    Procedure: TOTAL SHOULDER REVERSE ARTHROPLASTY;  Surgeon: Carl Hairston MD;  Location: [facility];  Service: Orthopedics;  Laterality: Left;    TOTAL SHOULDER ARTHROPLASTY W/ DISTAL CLAVICLE EXCISION Right 8/24/2022    Procedure: TOTAL SHOULDER REVERSE ARTHROPLASTY;  Surgeon: Carl Hairston MD;  Location: [facility];  Service: Orthopedics;  Laterality: Right;    URETEROSCOPY LASER LITHOTRIPSY WITH STENT INSERTION Left 01/14/2022    Procedure: CYSTOSCOPY, LEFT URETEROSCOPY, BILATERAL RETROGRADE PYELOGRAM;  Surgeon: Aung Bolton MD;  Location: [facility];  Service: Urology;  Laterality: Left;    UVULOPALATOPHARYNGOPLASTY       Social History     Occupational History    Not on file   Tobacco Use    Smoking status: Former     Current packs/day: 0.00     Average packs/day: 0.5 packs/day for 30.0 years (15.0 ttl pk-yrs)     Types: Cigarettes     Start date: 1/1/1985     Quit date: 1/1/2015     Years since quitting:  "10.5    Smokeless tobacco: Never   Vaping Use    Vaping status: Never Used   Substance and Sexual Activity    Alcohol use: Not Currently     Comment: BEER OCCASIONALLY    Drug use: Never    Sexual activity: Defer      Social History     Social History Narrative    Not on file     Family History   Problem Relation Age of Onset    Cancer Mother     Malig Hyperthermia Neg Hx        Allergies:   Allergies   Allergen Reactions    Ciprofloxacin Nausea Only    Tamsulosin Nausea Only and GI Intolerance     \"BLOATING\"    Levofloxacin GI Intolerance       Medications:   Home Medications:  Current Outpatient Medications on File Prior to Visit   Medication Sig    acetaminophen (TYLENOL) 500 MG tablet Take 2 tablets by mouth As Needed for Mild Pain.    ALLERGY SERUM INJECTION Inject 0.16 mL under the skin into the appropriate area as directed 1 (One) Time Per Week. wednesday    Amino Acids (GLUTARADE AMINO ACID BLEND PO) Take  by mouth.    [place] Thyroid 60 MG tablet Take 1 tablet by mouth Daily.    cetirizine (zyrTEC) 10 MG tablet Take 1 tablet by mouth Every Morning.    DHEA 25 MG capsule Take 1 capsule by mouth Daily.    fluticasone (FLONASE) 50 MCG/ACT nasal spray Administer 1 spray into the nostril(s) as directed by provider As Needed.    meloxicam (MOBIC) 15 MG tablet     Syringe/Needle, Disp, 21G X 1\" 3 ML misc BD ECLIPSE SYRINGE 21G X 1\" 3 ML    tadalafil (CIALIS) 5 MG tablet TAKE 1 TABLET BY MOUTH ONCE DAILY FOR INCONTINENCE    tamsulosin (FLOMAX) 0.4 MG capsule 24 hr capsule Take 1 capsule by mouth Daily.    vitamin D3 125 MCG (5000 UT) capsule capsule Take 1 capsule by mouth Daily.    Vitamins-Lipotropics ([employer] Inner Ear Plus) tablet Take 1 tablet by mouth 2 (Two) Times a Day.    Vitamins-Lipotropics (LIPO FLAVONOID PLUS PO)      No current facility-administered medications on file prior to visit.         ROS:  14 point review of systems was negative except as listed in the HPI     Physical Exam:   64 y.o. male  Body " mass index is 27.62 kg/m²., 100 kg (221 lb)  Vitals:    07/02/25 1529   Pulse: 64   SpO2: 96%         General: Alert, cooperative, appears well and in no observable distress.   HEENT: Normocephalic, atraumatic on external visual inspection. No icterus.   CV: No significant peripheral edema.   Respiratory: Normal respiratory effort.   Skin: Warm & well perfused; appropriate skin turgor.  Psych: Appropriate mood & affect.  Neuro: Gross sensation and motor intact in affected extremity/extremities.  Vascular: Peripheral pulses palpable in affected extremity/extremities. Calves/compartments soft and non tender, no evidence of DVT or compartment syndrome.    Right Knee Exam     Range of Motion   The patient has normal right knee ROM.    Tests   Carrie:  Medial - negative Lateral - negative              Knee Musculoskeletal Exam  Gait    Limp: right    Inspection    Right      Erythema: none        Effusion: mild        Edema: none        Ecchymosis: none        Deformity: none        Alignment: normal      Palpation    Right      Increased warmth: none      Masses: none        Tenderness: present          Medial joint line: mild      Range of Motion    Right      Right knee range of motion is normal and full.       Instability    Right      Varus stress grade: 1+      Valgus stress grade: 1+      Medial Carire test: negative      Lateral Carrie test: negative      Lachman: negative      Investigations:  XR KNEE 3 VW RIGHT     Date of Exam: 4/16/2025 3:13 PM EDT     Indication: DOI-3/10/2025 ruptured bakers cyst, ongoing pain since.     Comparison: None available.     Technique: 3 views of the right knee were obtained     Findings:  Negative for acute fracture. There is a small joint effusion. The patella is intact. Mild spurring at the medial and lateral compartment. Minimal chondrocalcinosis of the knee suggesting CPPD arthropathy. Mild patellofemoral spurring.     Single frontal view of the left knee demonstrates  mild osteoarthritis without acute abnormality.     IMPRESSION:  Impression:  1. Negative for acute osseous abnormality.  2. Mild tricompartmental osteoarthritis.  3. Small joint effusion.              Electronically Signed: Kennedy Mccarthy MD    4/17/2025 4:08 PM EDT    Workstation ID: [id number]          Assessment:  Assessment & Plan  1. Right Knee pain:  The knee pain is likely due to a ruptured Baker's cyst, which can cause swelling for several weeks to months. The pain and swelling have persisted for 3 months. The patient reports that the pain escalates with activity such as putting in hay and trimming horses, and it disrupts sleep. The pain is localized behind the knee and around the knee joint. There is no instability or significant popping and grinding. The patient has been managing the symptoms with icing and topical treatments, which have provided some relief.    An MRI will be ordered to further investigate the cause of the pain and swelling, ensuring no underlying injuries or soft tissue damage are missed. The MRI will help in determining the exact cause of the persistent symptoms and guide further treatment. The patient was advised that steroid injections could be considered after the MRI if needed. The risks of repeated steroid injections affecting ligaments and tendons were discussed, and it was agreed to proceed with the MRI first to avoid compromising the imaging results. The patient expressed concerns about the swelling and the impact on daily activities, and the importance of addressing these issues was acknowledged.    Follow-up: The patient will be contacted to schedule the MRI once insurance approval is obtained.    Body mass index is 27.62 kg/m².  BMI consistent with Overweight: 25.0-29.9kg/m2      Patient encouraged to call with questions or concerns in the interim      MIHAELA Mireles     Patient or patient representative verbalized consent for the use of Ambient Listening during the visit  with  MIHAELA Mireles for chart documentation. 7/7/2025  09:31 EDT

## 2025-07-23 ENCOUNTER — OFFICE VISIT (OUTPATIENT)
Dept: ORTHOPEDIC SURGERY | Facility: CLINIC | Age: 65
End: 2025-07-23
Payer: COMMERCIAL

## 2025-07-23 VITALS — HEIGHT: 75 IN | BODY MASS INDEX: 27.48 KG/M2 | RESPIRATION RATE: 20 BRPM | WEIGHT: 221 LBS | OXYGEN SATURATION: 98 %

## 2025-07-23 DIAGNOSIS — M76.891 TENDONITIS OF KNEE, RIGHT: ICD-10-CM

## 2025-07-23 DIAGNOSIS — M17.11 PRIMARY OSTEOARTHRITIS OF RIGHT KNEE: Primary | ICD-10-CM

## 2025-07-23 DIAGNOSIS — S83.231D COMPLEX TEAR OF MEDIAL MENISCUS OF RIGHT KNEE AS CURRENT INJURY, SUBSEQUENT ENCOUNTER: ICD-10-CM

## 2025-07-23 DIAGNOSIS — S83.271D COMPLEX TEAR OF LATERAL MENISCUS OF RIGHT KNEE AS CURRENT INJURY, SUBSEQUENT ENCOUNTER: ICD-10-CM

## 2025-07-28 NOTE — PROGRESS NOTES
FOLLOW UP VISIT        Patient Name: Robin Wheatley  : 1960  Primary Care Physician: Torsten Simms MD        Chief Complaint:  right knee pain and swelling     HPI:   Robin Wheatley is a 64 y.o. year old who presents today for follow up of the above and to largely review recent MRI results. He reports some improvement in the knee pain but still continues to have intermittent pain and swelling.       Past Medical/Surgical, Social and Family History:  I have reviewed and/or updated pertinent history as noted in the medical record including:  Past Medical History:   Diagnosis Date    Allergic     DJD of shoulder 2021    ED (erectile dysfunction)     History of chronic sinusitis     Hx of difficult intubation     X1 only more than 10 years ago    Hypertension     Kidney stone     Obesity (BMI 30-39.9)     Osteoarthritis of left glenohumeral joint 2021    Added automatically from request for surgery [id number]    Sleep apnea     has CPAP  bring dos    Spinal stenosis     Testicular hypofunction     Thrombocytopenia, chronic     Thyroid activity decreased     supplemented by 25 Again    Ureteral calculus 2022    Vertigo     hx     Past Surgical History:   Procedure Laterality Date    CATARACT EXTRACTION Right     COLON SURGERY      probable trauma related    COLONOSCOPY      KNEE ARTHROSCOPY Bilateral     X3    QUADRICEPS TENDON REPAIR Left     RETINAL DETACHMENT REPAIR Right     SEPTOPLASTY      SHOULDER SURGERY Bilateral     SINUS SURGERY      TOTAL SHOULDER ARTHROPLASTY W/ DISTAL CLAVICLE EXCISION Left 2021    Procedure: TOTAL SHOULDER REVERSE ARTHROPLASTY;  Surgeon: Carl Hairston MD;  Location: [facility] OR;  Service: Orthopedics;  Laterality: Left;    TOTAL SHOULDER ARTHROPLASTY W/ DISTAL CLAVICLE EXCISION Right 2022    Procedure: TOTAL SHOULDER REVERSE ARTHROPLASTY;  Surgeon: Carl Hairston MD;  Location: [facility] MAIN OR;  Service: Orthopedics;   "Laterality: Right;    URETEROSCOPY LASER LITHOTRIPSY WITH STENT INSERTION Left 01/14/2022    Procedure: CYSTOSCOPY, LEFT URETEROSCOPY, BILATERAL RETROGRADE PYELOGRAM;  Surgeon: Aung Bolton MD;  Location: [facility];  Service: Urology;  Laterality: Left;    UVULOPALATOPHARYNGOPLASTY       Social History     Occupational History    Not on file   Tobacco Use    Smoking status: Former     Current packs/day: 0.00     Average packs/day: 0.5 packs/day for 30.0 years (15.0 ttl pk-yrs)     Types: Cigarettes     Start date: 1/1/1985     Quit date: 1/1/2015     Years since quitting: 10.5    Smokeless tobacco: Never   Vaping Use    Vaping status: Never Used   Substance and Sexual Activity    Alcohol use: Not Currently     Comment: BEER OCCASIONALLY    Drug use: Never    Sexual activity: Defer      Social History     Social History Narrative    Not on file     Family History   Problem Relation Age of Onset    Cancer Mother     Malig Hyperthermia Neg Hx        Allergies:   Allergies   Allergen Reactions    Ciprofloxacin Nausea Only    Tamsulosin Nausea Only and GI Intolerance     \"BLOATING\"    Levofloxacin GI Intolerance       Medications:   Home Medications:  Current Outpatient Medications on File Prior to Visit   Medication Sig    acetaminophen (TYLENOL) 500 MG tablet Take 2 tablets by mouth As Needed for Mild Pain.    ALLERGY SERUM INJECTION Inject 0.16 mL under the skin into the appropriate area as directed 1 (One) Time Per Week. wednesday    Amino Acids (GLUTARADE AMINO ACID BLEND PO) Take  by mouth.    Heath Thyroid 60 MG tablet Take 1 tablet by mouth Daily.    cetirizine (zyrTEC) 10 MG tablet Take 1 tablet by mouth Every Morning.    DHEA 25 MG capsule Take 1 capsule by mouth Daily.    fluticasone (FLONASE) 50 MCG/ACT nasal spray Administer 1 spray into the nostril(s) as directed by provider As Needed.    meloxicam (MOBIC) 15 MG tablet     Syringe/Needle, Disp, 21G X 1\" 3 ML misc BD ECLIPSE SYRINGE 21G X 1\" 3 " ML    tadalafil (CIALIS) 5 MG tablet TAKE 1 TABLET BY MOUTH ONCE DAILY FOR INCONTINENCE    tamsulosin (FLOMAX) 0.4 MG capsule 24 hr capsule Take 1 capsule by mouth Daily.    vitamin D3 125 MCG (5000 UT) capsule capsule Take 1 capsule by mouth Daily.    Vitamins-Lipotropics ([facility] Inner Ear Plus) tablet Take 1 tablet by mouth 2 (Two) Times a Day.    Vitamins-Lipotropics (LIPO FLAVONOID PLUS PO)      No current facility-administered medications on file prior to visit.         ROS:  14 point review of systems was negative except as listed in the HPI     Physical Exam:   64 y.o. male  Body mass index is 27.62 kg/m²., 100 kg (221 lb)  Vitals:    07/23/25 1530   Resp: 20   SpO2: 98%         General: Alert, cooperative, appears well and in no observable distress.   HEENT: Normocephalic, atraumatic on external visual inspection. No icterus.   CV: No significant peripheral edema.   Respiratory: Normal respiratory effort.   Skin: Warm & well perfused; appropriate skin turgor.  Psych: Appropriate mood & affect.  Neuro: Gross sensation and motor intact in affected extremity/extremities.  Vascular: Peripheral pulses palpable in affected extremity/extremities. Calves/compartments soft and non tender, no evidence of DVT or compartment syndrome.           Investigations:  MRI KNEE WITHOUT CONTRAST RIGHT    Date of Exam: 7/18/2025 15:42 EDT    Indication: Chronic right knee pain.    Comparison: Knee radiograph 4/16/2025    Technique: Routine multiplanar/multisequence images of the right knee were obtained without contrast administration.      Findings:  Osseous Structures and Intra-Articular  Bone marrow signal intensity is normal. No osseous contusions or fractures. There is moderate medial and mild patellofemoral and lateral joint space narrowing. Moderate size joint effusion. No intra-articular loose bodies.    Ligaments  The anterior cruciate ligament and posterior cruciate ligaments are intact. Medial collateral ligament and  lateral collateral ligament complex are intact.    Menisci  There is a horizontal tear throughout the entire lateral meniscus. There is an intrameniscal cyst at the junction of the body and posterior horn at the periphery measuring up to 6 mm. There is an nondisplaced flap tear of the posterior horn of the lateral meniscus.    There is complex tearing of the posterior horn the medial meniscus with minimal tissue remaining at the posterior horn and body and tear extending to the posterior root. There is mild extrusion of the remaining body tissue in the medial gutter by about 3 mm.    Extensor compartment  Patella has anatomic position. Extensor mechanism is intact. High signal intensity of the distal quadriceps tendon and throughout the patellar tendon consistent with tendinosis.    Cartilage  There is severe cartilage thinning most pronounced in the medial compartment. Partial-thickness cartilage defect seen at the lateral patellar facet.    Soft tissues  No soft tissue masses. Small Baker cyst without evidence of recent Baker cyst rupture.    Miscellaneous  Iliotibial band is normal. Popliteus muscle and tendon are normal in appearance.      Impression:  1. There is moderate medial and mild lateral patellofemoral compartment arthritis with moderate size joint effusion.  2. There is a horizontal tear throughout the entire lateral meniscus with intrameniscal cyst at the junction of the body and posterior horn.  3. There is a nondisplaced flap type tear of the posterior horn lateral meniscus.  4. There is complex tearing of the posterior horn and body of the medial meniscus with mild extrusion of the body tissue in the medial gutter.  5. There is tendinosis of the quadriceps and patellar tendons.  6. There is a small Baker's cyst.        Electronically Signed: Mónica Wall MD  7/21/2025 14:45 EDT  Workstation ID: [id number]              Assessment:  Right knee osteoarthritis  Right knee medial meniscus tear  Right knee  lateral meniscus tear  Right quadricep tendonosis    Body mass index is 27.62 kg/m².  BMI consistent with Overweight: 25.0-29.9kg/m2              Plan:  I have reviewed the above imaging with the patient in detail today  We discussed surgical intervention and conservative measures including injections, bracing and PT  He was offered a Drytex brace but declined  He received a steroid injection from me recently  He is not interested in PT  Will follow up PRN   Patient encouraged to call with questions or concerns in the interim      MIHAELA Mireles

## (undated) DEVICE — DECANTER: Brand: UNBRANDED

## (undated) DEVICE — DRSNG SURESITE WNDW 4X4.5

## (undated) DEVICE — UNDYED BRAIDED (POLYGLACTIN 910), SYNTHETIC ABSORBABLE SUTURE: Brand: COATED VICRYL

## (undated) DEVICE — SLV SCD CALF HEMOFORCE DVT THERP REPROC MD

## (undated) DEVICE — MARKR SKIN W/RULR

## (undated) DEVICE — KT SURG TURNOVER 050

## (undated) DEVICE — SOL IRRIG NACL 1000ML

## (undated) DEVICE — TIDISHIELD UROLOGY DRAIN BAGS FROSTY VINYL STERILE FITS SIEMENS UROSKOP ACCESS 20 PER CASE: Brand: TIDISHIELD

## (undated) DEVICE — WRAP SHOULDER COLD THERAPY

## (undated) DEVICE — TOWEL,OR,DSP,ST,NATURAL,DLX,4/PK,20PK/CS: Brand: MEDLINE

## (undated) DEVICE — SUT VIC 2/0 CT2 27IN J269H

## (undated) DEVICE — SUT VIC 2/0 CT1 27IN J259H

## (undated) DEVICE — SOL IRRIG H2O 1000ML STRL

## (undated) DEVICE — GLV SURG BIOGEL LTX PF 7

## (undated) DEVICE — SUT MNCRYL 3/0 Y936H

## (undated) DEVICE — GLV SURG SIGNATURE ESSENTIAL PF LTX SZ8.5

## (undated) DEVICE — GLV SURG SENSICARE PI ORTHO SZ7.5 LF STRL

## (undated) DEVICE — UNDRGLV SURG BIOGEL PIMICROINDICATOR SYNTH SZ7.5PF STRL PR/2

## (undated) DEVICE — UNDERGLV SURG BIOGEL INDICAT PI SZ8 BLU

## (undated) DEVICE — PK TOTL SHLDR 50

## (undated) DEVICE — PK URETSCP 40

## (undated) DEVICE — SUT NONABS MAXBRAID/PE NMBR2 C7 38IN BLU 900335: Type: IMPLANTABLE DEVICE | Site: SHOULDER | Status: NON-FUNCTIONAL

## (undated) DEVICE — DRSNG BARR INSUL SHLDR POLAR/CARE S/ADHS XL

## (undated) DEVICE — T-MAX DISPOSABLE FACE MASK 8 PER BOX

## (undated) DEVICE — TOWEL,OR,DSP,ST,WHITE,DLX,4/PK,20PK/CS: Brand: MEDLINE

## (undated) DEVICE — PAD COLD/THRP SHLDR POLAR/CARE WRAP/ON UNIV XL

## (undated) DEVICE — DUAL CUT SAGITTAL BLADE

## (undated) DEVICE — PRT BIOP SEALS

## (undated) DEVICE — SOL IRR NACL 0.9PCT ARTHROMATIC 3000ML

## (undated) DEVICE — ADHS SKIN PREMIERPRO EXOFIN TOPICAL HI/VISC .5ML

## (undated) DEVICE — SKIN AFFIX SURG ADHESIVE 72/CS 0.55ML: Brand: MEDLINE

## (undated) DEVICE — SUT VIC 0 CT1 CR8 18IN J840D

## (undated) DEVICE — PENCL EVAC ULTRAVAC SMOKE W/BLD

## (undated) DEVICE — GLV SURG SENSICARE PI ORTHO SZ8 LF STRL

## (undated) DEVICE — SYS COLD/THRP POLAR/CARE/CUBE

## (undated) DEVICE — GLV SURG DERMASSURE GRN LF PF 8.5

## (undated) DEVICE — SOLUTION,WATER,IRRIGATION,1000ML,STERILE: Brand: MEDLINE

## (undated) DEVICE — CATH URETRL FLXITP POLLACK STD 5F 70CM